# Patient Record
Sex: FEMALE | Race: OTHER | HISPANIC OR LATINO
[De-identification: names, ages, dates, MRNs, and addresses within clinical notes are randomized per-mention and may not be internally consistent; named-entity substitution may affect disease eponyms.]

---

## 2017-01-26 ENCOUNTER — APPOINTMENT (OUTPATIENT)
Dept: HEART AND VASCULAR | Facility: CLINIC | Age: 72
End: 2017-01-26

## 2017-01-26 VITALS — DIASTOLIC BLOOD PRESSURE: 68 MMHG | SYSTOLIC BLOOD PRESSURE: 124 MMHG | OXYGEN SATURATION: 97 % | HEART RATE: 63 BPM

## 2017-01-26 VITALS — BODY MASS INDEX: 33.55 KG/M2 | HEIGHT: 61.5 IN | WEIGHT: 180 LBS

## 2017-01-26 VITALS — TEMPERATURE: 98 F

## 2017-01-26 DIAGNOSIS — F17.200 NICOTINE DEPENDENCE, UNSPECIFIED, UNCOMPLICATED: ICD-10-CM

## 2017-01-26 RX ORDER — ASPIRIN 325 MG
TABLET ORAL
Refills: 0 | Status: ACTIVE | COMMUNITY

## 2017-01-27 LAB
ALBUMIN SERPL ELPH-MCNC: 4.3 G/DL
ALP BLD-CCNC: 116 U/L
ALT SERPL-CCNC: 9 U/L
ANION GAP SERPL CALC-SCNC: 16 MMOL/L
APPEARANCE: CLEAR
AST SERPL-CCNC: 17 U/L
BACTERIA: NEGATIVE
BASOPHILS # BLD AUTO: 0.07 K/UL
BASOPHILS NFR BLD AUTO: 1.3 %
BILIRUB SERPL-MCNC: 0.3 MG/DL
BILIRUBIN URINE: NEGATIVE
BLOOD URINE: NEGATIVE
BUN SERPL-MCNC: 15 MG/DL
CALCIUM SERPL-MCNC: 9.4 MG/DL
CHLORIDE SERPL-SCNC: 104 MMOL/L
CHOLEST SERPL-MCNC: 198 MG/DL
CHOLEST/HDLC SERPL: 4.3 RATIO
CO2 SERPL-SCNC: 24 MMOL/L
COLOR: YELLOW
CREAT SERPL-MCNC: 0.76 MG/DL
EOSINOPHIL # BLD AUTO: 0.12 K/UL
EOSINOPHIL NFR BLD AUTO: 2.3 %
GLUCOSE QUALITATIVE U: NORMAL MG/DL
GLUCOSE SERPL-MCNC: 103 MG/DL
HBA1C MFR BLD HPLC: 6.1 %
HCT VFR BLD CALC: 41.1 %
HDLC SERPL-MCNC: 46 MG/DL
HGB BLD-MCNC: 12.4 G/DL
HYALINE CASTS: 1 /LPF
IMM GRANULOCYTES NFR BLD AUTO: 0.2 %
KETONES URINE: NEGATIVE
LDLC SERPL CALC-MCNC: 119 MG/DL
LEUKOCYTE ESTERASE URINE: NEGATIVE
LYMPHOCYTES # BLD AUTO: 1.89 K/UL
LYMPHOCYTES NFR BLD AUTO: 35.7 %
MAN DIFF?: NORMAL
MCHC RBC-ENTMCNC: 29.2 PG
MCHC RBC-ENTMCNC: 30.2 GM/DL
MCV RBC AUTO: 96.7 FL
MICROSCOPIC-UA: NORMAL
MONOCYTES # BLD AUTO: 0.37 K/UL
MONOCYTES NFR BLD AUTO: 7 %
NEUTROPHILS # BLD AUTO: 2.83 K/UL
NEUTROPHILS NFR BLD AUTO: 53.5 %
NITRITE URINE: NEGATIVE
PH URINE: 6.5
PLATELET # BLD AUTO: 321 K/UL
POTASSIUM SERPL-SCNC: 4.3 MMOL/L
PROT SERPL-MCNC: 6.8 G/DL
PROTEIN URINE: NEGATIVE MG/DL
RBC # BLD: 4.25 M/UL
RBC # FLD: 14.9 %
RED BLOOD CELLS URINE: 1 /HPF
SODIUM SERPL-SCNC: 144 MMOL/L
SPECIFIC GRAVITY URINE: 1.01
SQUAMOUS EPITHELIAL CELLS: 1 /HPF
TRIGL SERPL-MCNC: 164 MG/DL
TSH SERPL-ACNC: 1.25 UIU/ML
UROBILINOGEN URINE: NORMAL MG/DL
WBC # FLD AUTO: 5.29 K/UL
WHITE BLOOD CELLS URINE: 0 /HPF

## 2017-02-28 ENCOUNTER — FORM ENCOUNTER (OUTPATIENT)
Age: 72
End: 2017-02-28

## 2017-03-01 ENCOUNTER — APPOINTMENT (OUTPATIENT)
Dept: ORTHOPEDIC SURGERY | Facility: CLINIC | Age: 72
End: 2017-03-01

## 2017-03-01 ENCOUNTER — OUTPATIENT (OUTPATIENT)
Dept: OUTPATIENT SERVICES | Facility: HOSPITAL | Age: 72
LOS: 1 days | End: 2017-03-01
Payer: MEDICARE

## 2017-03-01 DIAGNOSIS — Z86.79 PERSONAL HISTORY OF OTHER DISEASES OF THE CIRCULATORY SYSTEM: ICD-10-CM

## 2017-03-01 DIAGNOSIS — M25.512 PAIN IN LEFT SHOULDER: ICD-10-CM

## 2017-03-01 PROCEDURE — 73030 X-RAY EXAM OF SHOULDER: CPT | Mod: 26,LT

## 2017-03-01 PROCEDURE — 73564 X-RAY EXAM KNEE 4 OR MORE: CPT

## 2017-03-01 PROCEDURE — 73030 X-RAY EXAM OF SHOULDER: CPT

## 2017-03-01 PROCEDURE — 73564 X-RAY EXAM KNEE 4 OR MORE: CPT | Mod: 26,LT

## 2017-03-06 ENCOUNTER — APPOINTMENT (OUTPATIENT)
Dept: ORTHOPEDIC SURGERY | Facility: CLINIC | Age: 72
End: 2017-03-06

## 2017-03-06 ENCOUNTER — FORM ENCOUNTER (OUTPATIENT)
Age: 72
End: 2017-03-06

## 2017-03-07 ENCOUNTER — OUTPATIENT (OUTPATIENT)
Dept: OUTPATIENT SERVICES | Facility: HOSPITAL | Age: 72
LOS: 1 days | End: 2017-03-07
Payer: MEDICARE

## 2017-03-07 PROCEDURE — 20611 DRAIN/INJ JOINT/BURSA W/US: CPT | Mod: LT

## 2017-03-07 PROCEDURE — 20611 DRAIN/INJ JOINT/BURSA W/US: CPT

## 2017-03-07 PROCEDURE — 81003 URINALYSIS AUTO W/O SCOPE: CPT

## 2017-03-13 ENCOUNTER — MEDICATION RENEWAL (OUTPATIENT)
Age: 72
End: 2017-03-13

## 2017-03-16 ENCOUNTER — APPOINTMENT (OUTPATIENT)
Dept: ORTHOPEDIC SURGERY | Facility: CLINIC | Age: 72
End: 2017-03-16

## 2017-03-22 ENCOUNTER — APPOINTMENT (OUTPATIENT)
Dept: ORTHOPEDIC SURGERY | Facility: CLINIC | Age: 72
End: 2017-03-22

## 2017-04-26 ENCOUNTER — APPOINTMENT (OUTPATIENT)
Dept: ORTHOPEDIC SURGERY | Facility: CLINIC | Age: 72
End: 2017-04-26

## 2017-05-17 ENCOUNTER — APPOINTMENT (OUTPATIENT)
Dept: ORTHOPEDIC SURGERY | Facility: CLINIC | Age: 72
End: 2017-05-17

## 2017-05-29 ENCOUNTER — TRANSCRIPTION ENCOUNTER (OUTPATIENT)
Age: 72
End: 2017-05-29

## 2017-06-14 ENCOUNTER — TRANSCRIPTION ENCOUNTER (OUTPATIENT)
Age: 72
End: 2017-06-14

## 2017-08-28 ENCOUNTER — APPOINTMENT (OUTPATIENT)
Dept: HEART AND VASCULAR | Facility: CLINIC | Age: 72
End: 2017-08-28
Payer: MEDICARE

## 2017-08-28 VITALS
SYSTOLIC BLOOD PRESSURE: 126 MMHG | DIASTOLIC BLOOD PRESSURE: 78 MMHG | BODY MASS INDEX: 33.92 KG/M2 | HEIGHT: 61.5 IN | OXYGEN SATURATION: 94 % | TEMPERATURE: 98.1 F | HEART RATE: 55 BPM | WEIGHT: 182 LBS

## 2017-08-28 PROCEDURE — 99214 OFFICE O/P EST MOD 30 MIN: CPT | Mod: 25

## 2017-08-28 PROCEDURE — 36415 COLL VENOUS BLD VENIPUNCTURE: CPT

## 2017-08-29 LAB
ALBUMIN SERPL ELPH-MCNC: 4.3 G/DL
ALP BLD-CCNC: 106 U/L
ALT SERPL-CCNC: 10 U/L
ANION GAP SERPL CALC-SCNC: 15 MMOL/L
AST SERPL-CCNC: 21 U/L
BASOPHILS # BLD AUTO: 0.06 K/UL
BASOPHILS NFR BLD AUTO: 1.3 %
BILIRUB SERPL-MCNC: 0.4 MG/DL
BUN SERPL-MCNC: 11 MG/DL
CALCIUM SERPL-MCNC: 9.7 MG/DL
CHLORIDE SERPL-SCNC: 106 MMOL/L
CHOLEST SERPL-MCNC: 181 MG/DL
CHOLEST/HDLC SERPL: 3.5 RATIO
CO2 SERPL-SCNC: 25 MMOL/L
CREAT SERPL-MCNC: 0.82 MG/DL
EOSINOPHIL # BLD AUTO: 0.11 K/UL
EOSINOPHIL NFR BLD AUTO: 2.4 %
GLUCOSE SERPL-MCNC: 87 MG/DL
HBA1C MFR BLD HPLC: 6 %
HCT VFR BLD CALC: 40.3 %
HDLC SERPL-MCNC: 51 MG/DL
HGB BLD-MCNC: 12.3 G/DL
IMM GRANULOCYTES NFR BLD AUTO: 0 %
LDLC SERPL CALC-MCNC: 99 MG/DL
LYMPHOCYTES # BLD AUTO: 1.59 K/UL
LYMPHOCYTES NFR BLD AUTO: 34.7 %
MAN DIFF?: NORMAL
MCHC RBC-ENTMCNC: 28.5 PG
MCHC RBC-ENTMCNC: 30.5 GM/DL
MCV RBC AUTO: 93.5 FL
MONOCYTES # BLD AUTO: 0.34 K/UL
MONOCYTES NFR BLD AUTO: 7.4 %
NEUTROPHILS # BLD AUTO: 2.48 K/UL
NEUTROPHILS NFR BLD AUTO: 54.2 %
PLATELET # BLD AUTO: 305 K/UL
POTASSIUM SERPL-SCNC: 4.7 MMOL/L
PROT SERPL-MCNC: 7.4 G/DL
RBC # BLD: 4.31 M/UL
RBC # FLD: 14.2 %
SODIUM SERPL-SCNC: 146 MMOL/L
TRIGL SERPL-MCNC: 155 MG/DL
TSH SERPL-ACNC: 1.09 UIU/ML
WBC # FLD AUTO: 4.58 K/UL

## 2017-09-05 ENCOUNTER — TRANSCRIPTION ENCOUNTER (OUTPATIENT)
Age: 72
End: 2017-09-05

## 2017-09-28 ENCOUNTER — APPOINTMENT (OUTPATIENT)
Dept: HEART AND VASCULAR | Facility: CLINIC | Age: 72
End: 2017-09-28
Payer: MEDICARE

## 2017-09-28 PROCEDURE — 78452 HT MUSCLE IMAGE SPECT MULT: CPT

## 2017-09-28 PROCEDURE — 93015 CV STRESS TEST SUPVJ I&R: CPT

## 2017-09-28 PROCEDURE — A9500: CPT

## 2017-09-28 PROCEDURE — 93306 TTE W/DOPPLER COMPLETE: CPT | Mod: XE

## 2017-09-28 PROCEDURE — 99214 OFFICE O/P EST MOD 30 MIN: CPT | Mod: 25

## 2017-10-11 ENCOUNTER — APPOINTMENT (OUTPATIENT)
Dept: ORTHOPEDIC SURGERY | Facility: CLINIC | Age: 72
End: 2017-10-11

## 2017-10-11 ENCOUNTER — APPOINTMENT (OUTPATIENT)
Dept: ORTHOPEDIC SURGERY | Facility: CLINIC | Age: 72
End: 2017-10-11
Payer: MEDICARE

## 2017-10-11 PROCEDURE — 99214 OFFICE O/P EST MOD 30 MIN: CPT

## 2017-10-18 ENCOUNTER — FORM ENCOUNTER (OUTPATIENT)
Age: 72
End: 2017-10-18

## 2017-10-19 ENCOUNTER — APPOINTMENT (OUTPATIENT)
Dept: ULTRASOUND IMAGING | Facility: CLINIC | Age: 72
End: 2017-10-19
Payer: MEDICARE

## 2017-10-19 ENCOUNTER — OUTPATIENT (OUTPATIENT)
Dept: OUTPATIENT SERVICES | Facility: HOSPITAL | Age: 72
LOS: 1 days | End: 2017-10-19

## 2017-10-19 PROCEDURE — 20611 DRAIN/INJ JOINT/BURSA W/US: CPT | Mod: LT

## 2017-10-23 ENCOUNTER — FORM ENCOUNTER (OUTPATIENT)
Age: 72
End: 2017-10-23

## 2017-10-24 ENCOUNTER — OUTPATIENT (OUTPATIENT)
Dept: OUTPATIENT SERVICES | Facility: HOSPITAL | Age: 72
LOS: 1 days | End: 2017-10-24
Payer: MEDICARE

## 2017-10-24 ENCOUNTER — APPOINTMENT (OUTPATIENT)
Dept: ORTHOPEDIC SURGERY | Facility: CLINIC | Age: 72
End: 2017-10-24
Payer: MEDICARE

## 2017-10-24 VITALS
BODY MASS INDEX: 33.92 KG/M2 | DIASTOLIC BLOOD PRESSURE: 80 MMHG | WEIGHT: 182 LBS | HEIGHT: 61.5 IN | SYSTOLIC BLOOD PRESSURE: 120 MMHG

## 2017-10-24 PROCEDURE — 72170 X-RAY EXAM OF PELVIS: CPT | Mod: 26

## 2017-10-24 PROCEDURE — 72170 X-RAY EXAM OF PELVIS: CPT

## 2017-10-24 PROCEDURE — 99215 OFFICE O/P EST HI 40 MIN: CPT

## 2017-11-01 ENCOUNTER — OTHER (OUTPATIENT)
Age: 72
End: 2017-11-01

## 2017-11-05 ENCOUNTER — FORM ENCOUNTER (OUTPATIENT)
Age: 72
End: 2017-11-05

## 2017-11-06 ENCOUNTER — OUTPATIENT (OUTPATIENT)
Dept: OUTPATIENT SERVICES | Facility: HOSPITAL | Age: 72
LOS: 1 days | End: 2017-11-06
Payer: MEDICARE

## 2017-11-06 DIAGNOSIS — Z22.321 CARRIER OR SUSPECTED CARRIER OF METHICILLIN SUSCEPTIBLE STAPHYLOCOCCUS AUREUS: ICD-10-CM

## 2017-11-06 LAB
ANION GAP SERPL CALC-SCNC: 12 MMOL/L — SIGNIFICANT CHANGE UP (ref 5–17)
APPEARANCE UR: CLEAR — SIGNIFICANT CHANGE UP
APTT BLD: 29.2 SEC — SIGNIFICANT CHANGE UP (ref 27.5–37.4)
BACTERIA # UR AUTO: PRESENT /HPF
BILIRUB UR-MCNC: NEGATIVE — SIGNIFICANT CHANGE UP
BUN SERPL-MCNC: 15 MG/DL — SIGNIFICANT CHANGE UP (ref 7–23)
CALCIUM SERPL-MCNC: 10.2 MG/DL — SIGNIFICANT CHANGE UP (ref 8.4–10.5)
CHLORIDE SERPL-SCNC: 102 MMOL/L — SIGNIFICANT CHANGE UP (ref 96–108)
CO2 SERPL-SCNC: 28 MMOL/L — SIGNIFICANT CHANGE UP (ref 22–31)
COLOR SPEC: YELLOW — SIGNIFICANT CHANGE UP
CREAT SERPL-MCNC: 0.86 MG/DL — SIGNIFICANT CHANGE UP (ref 0.5–1.3)
DIFF PNL FLD: NEGATIVE — SIGNIFICANT CHANGE UP
EPI CELLS # UR: SIGNIFICANT CHANGE UP /HPF (ref 0–5)
GLUCOSE SERPL-MCNC: 88 MG/DL — SIGNIFICANT CHANGE UP (ref 70–99)
GLUCOSE UR QL: NEGATIVE — SIGNIFICANT CHANGE UP
HBA1C BLD-MCNC: 5.8 % — HIGH (ref 4–5.6)
HCT VFR BLD CALC: 41.5 % — SIGNIFICANT CHANGE UP (ref 34.5–45)
HGB BLD-MCNC: 12.9 G/DL — SIGNIFICANT CHANGE UP (ref 11.5–15.5)
INR BLD: 0.96 — SIGNIFICANT CHANGE UP (ref 0.88–1.16)
KETONES UR-MCNC: NEGATIVE — SIGNIFICANT CHANGE UP
LEUKOCYTE ESTERASE UR-ACNC: (no result)
MCHC RBC-ENTMCNC: 28.9 PG — SIGNIFICANT CHANGE UP (ref 27–34)
MCHC RBC-ENTMCNC: 31.1 G/DL — LOW (ref 32–36)
MCV RBC AUTO: 92.8 FL — SIGNIFICANT CHANGE UP (ref 80–100)
MRSA PCR RESULT.: NEGATIVE — SIGNIFICANT CHANGE UP
NITRITE UR-MCNC: NEGATIVE — SIGNIFICANT CHANGE UP
PH UR: 5.5 — SIGNIFICANT CHANGE UP (ref 5–8)
PLATELET # BLD AUTO: 297 K/UL — SIGNIFICANT CHANGE UP (ref 150–400)
POTASSIUM SERPL-MCNC: 4.8 MMOL/L — SIGNIFICANT CHANGE UP (ref 3.5–5.3)
POTASSIUM SERPL-SCNC: 4.8 MMOL/L — SIGNIFICANT CHANGE UP (ref 3.5–5.3)
PROT UR-MCNC: NEGATIVE MG/DL — SIGNIFICANT CHANGE UP
PROTHROM AB SERPL-ACNC: 10.6 SEC — SIGNIFICANT CHANGE UP (ref 9.8–12.7)
RBC # BLD: 4.47 M/UL — SIGNIFICANT CHANGE UP (ref 3.8–5.2)
RBC # FLD: 13.5 % — SIGNIFICANT CHANGE UP (ref 10.3–16.9)
RBC CASTS # UR COMP ASSIST: < 5 /HPF — SIGNIFICANT CHANGE UP
S AUREUS DNA NOSE QL NAA+PROBE: NEGATIVE — SIGNIFICANT CHANGE UP
SODIUM SERPL-SCNC: 142 MMOL/L — SIGNIFICANT CHANGE UP (ref 135–145)
SP GR SPEC: <=1.005 — SIGNIFICANT CHANGE UP (ref 1–1.03)
UROBILINOGEN FLD QL: 0.2 E.U./DL — SIGNIFICANT CHANGE UP
WBC # BLD: 4.9 K/UL — SIGNIFICANT CHANGE UP (ref 3.8–10.5)
WBC # FLD AUTO: 4.9 K/UL — SIGNIFICANT CHANGE UP (ref 3.8–10.5)
WBC UR QL: < 5 /HPF — SIGNIFICANT CHANGE UP

## 2017-11-06 PROCEDURE — 81001 URINALYSIS AUTO W/SCOPE: CPT

## 2017-11-06 PROCEDURE — 36415 COLL VENOUS BLD VENIPUNCTURE: CPT

## 2017-11-06 PROCEDURE — 71020: CPT | Mod: 26

## 2017-11-06 PROCEDURE — 85610 PROTHROMBIN TIME: CPT

## 2017-11-06 PROCEDURE — 83036 HEMOGLOBIN GLYCOSYLATED A1C: CPT

## 2017-11-06 PROCEDURE — 85730 THROMBOPLASTIN TIME PARTIAL: CPT

## 2017-11-06 PROCEDURE — 87641 MR-STAPH DNA AMP PROBE: CPT

## 2017-11-06 PROCEDURE — 80048 BASIC METABOLIC PNL TOTAL CA: CPT

## 2017-11-06 PROCEDURE — 85027 COMPLETE CBC AUTOMATED: CPT

## 2017-11-06 PROCEDURE — 71046 X-RAY EXAM CHEST 2 VIEWS: CPT

## 2017-11-06 PROCEDURE — 87086 URINE CULTURE/COLONY COUNT: CPT

## 2017-11-07 LAB
CULTURE RESULTS: NO GROWTH — SIGNIFICANT CHANGE UP
SPECIMEN SOURCE: SIGNIFICANT CHANGE UP

## 2017-11-13 ENCOUNTER — APPOINTMENT (OUTPATIENT)
Dept: HEART AND VASCULAR | Facility: CLINIC | Age: 72
End: 2017-11-13
Payer: MEDICARE

## 2017-11-13 ENCOUNTER — APPOINTMENT (OUTPATIENT)
Dept: ORTHOPEDIC SURGERY | Facility: CLINIC | Age: 72
End: 2017-11-13
Payer: MEDICARE

## 2017-11-13 VITALS
BODY MASS INDEX: 34.29 KG/M2 | SYSTOLIC BLOOD PRESSURE: 130 MMHG | OXYGEN SATURATION: 96 % | DIASTOLIC BLOOD PRESSURE: 80 MMHG | HEIGHT: 61.5 IN | HEART RATE: 52 BPM | WEIGHT: 184 LBS | TEMPERATURE: 98.1 F

## 2017-11-13 PROCEDURE — 99213 OFFICE O/P EST LOW 20 MIN: CPT

## 2017-11-13 PROCEDURE — 99214 OFFICE O/P EST MOD 30 MIN: CPT | Mod: 25

## 2017-11-13 PROCEDURE — 93000 ELECTROCARDIOGRAM COMPLETE: CPT

## 2017-11-13 RX ORDER — LOSARTAN POTASSIUM 100 MG/1
TABLET, FILM COATED ORAL
Refills: 0 | Status: DISCONTINUED | COMMUNITY
End: 2017-11-13

## 2017-11-13 RX ORDER — FUROSEMIDE 20 MG/1
20 TABLET ORAL
Refills: 0 | Status: DISCONTINUED | COMMUNITY
End: 2017-11-13

## 2017-11-19 ENCOUNTER — FORM ENCOUNTER (OUTPATIENT)
Age: 72
End: 2017-11-19

## 2017-11-20 ENCOUNTER — APPOINTMENT (OUTPATIENT)
Dept: ORTHOPEDIC SURGERY | Facility: HOSPITAL | Age: 72
End: 2017-11-20

## 2017-11-20 ENCOUNTER — RESULT REVIEW (OUTPATIENT)
Age: 72
End: 2017-11-20

## 2017-11-20 ENCOUNTER — TRANSCRIPTION ENCOUNTER (OUTPATIENT)
Age: 72
End: 2017-11-20

## 2017-11-20 ENCOUNTER — INPATIENT (INPATIENT)
Facility: HOSPITAL | Age: 72
LOS: 3 days | Discharge: HOME HEALTH | DRG: 470 | End: 2017-11-24
Attending: ORTHOPAEDIC SURGERY | Admitting: ORTHOPAEDIC SURGERY
Payer: MEDICARE

## 2017-11-20 VITALS
HEIGHT: 62 IN | SYSTOLIC BLOOD PRESSURE: 160 MMHG | WEIGHT: 182.98 LBS | OXYGEN SATURATION: 96 % | TEMPERATURE: 98 F | RESPIRATION RATE: 20 BRPM | HEART RATE: 102 BPM | DIASTOLIC BLOOD PRESSURE: 80 MMHG

## 2017-11-20 DIAGNOSIS — Z96.641 PRESENCE OF RIGHT ARTIFICIAL HIP JOINT: Chronic | ICD-10-CM

## 2017-11-20 DIAGNOSIS — M17.12 UNILATERAL PRIMARY OSTEOARTHRITIS, LEFT KNEE: ICD-10-CM

## 2017-11-20 DIAGNOSIS — Z96.651 PRESENCE OF RIGHT ARTIFICIAL KNEE JOINT: Chronic | ICD-10-CM

## 2017-11-20 PROCEDURE — 73560 X-RAY EXAM OF KNEE 1 OR 2: CPT | Mod: 26,LT

## 2017-11-20 PROCEDURE — 27447 TOTAL KNEE ARTHROPLASTY: CPT | Mod: LT

## 2017-11-20 RX ORDER — ACETAMINOPHEN 500 MG
650 TABLET ORAL EVERY 6 HOURS
Qty: 0 | Refills: 0 | Status: DISCONTINUED | OUTPATIENT
Start: 2017-11-20 | End: 2017-11-24

## 2017-11-20 RX ORDER — POLYETHYLENE GLYCOL 3350 17 G/17G
17 POWDER, FOR SOLUTION ORAL DAILY
Qty: 0 | Refills: 0 | Status: DISCONTINUED | OUTPATIENT
Start: 2017-11-20 | End: 2017-11-24

## 2017-11-20 RX ORDER — OXYCODONE HYDROCHLORIDE 5 MG/1
20 TABLET ORAL ONCE
Qty: 0 | Refills: 0 | Status: DISCONTINUED | OUTPATIENT
Start: 2017-11-20 | End: 2017-11-20

## 2017-11-20 RX ORDER — BUPIVACAINE 13.3 MG/ML
20 INJECTION, SUSPENSION, LIPOSOMAL INFILTRATION ONCE
Qty: 0 | Refills: 0 | Status: DISCONTINUED | OUTPATIENT
Start: 2017-11-20 | End: 2017-11-24

## 2017-11-20 RX ORDER — OXYCODONE HYDROCHLORIDE 5 MG/1
10 TABLET ORAL EVERY 4 HOURS
Qty: 0 | Refills: 0 | Status: DISCONTINUED | OUTPATIENT
Start: 2017-11-20 | End: 2017-11-24

## 2017-11-20 RX ORDER — MORPHINE SULFATE 50 MG/1
4 CAPSULE, EXTENDED RELEASE ORAL ONCE
Qty: 0 | Refills: 0 | Status: DISCONTINUED | OUTPATIENT
Start: 2017-11-20 | End: 2017-11-24

## 2017-11-20 RX ORDER — SERTRALINE 25 MG/1
50 TABLET, FILM COATED ORAL DAILY
Qty: 0 | Refills: 0 | Status: DISCONTINUED | OUTPATIENT
Start: 2017-11-20 | End: 2017-11-24

## 2017-11-20 RX ORDER — MAGNESIUM HYDROXIDE 400 MG/1
30 TABLET, CHEWABLE ORAL DAILY
Qty: 0 | Refills: 0 | Status: DISCONTINUED | OUTPATIENT
Start: 2017-11-20 | End: 2017-11-24

## 2017-11-20 RX ORDER — DOCUSATE SODIUM 100 MG
100 CAPSULE ORAL THREE TIMES A DAY
Qty: 0 | Refills: 0 | Status: DISCONTINUED | OUTPATIENT
Start: 2017-11-20 | End: 2017-11-24

## 2017-11-20 RX ORDER — INFLUENZA VIRUS VACCINE 15; 15; 15; 15 UG/.5ML; UG/.5ML; UG/.5ML; UG/.5ML
0.5 SUSPENSION INTRAMUSCULAR ONCE
Qty: 0 | Refills: 0 | Status: COMPLETED | OUTPATIENT
Start: 2017-11-20 | End: 2017-11-20

## 2017-11-20 RX ORDER — SODIUM CHLORIDE 9 MG/ML
1000 INJECTION, SOLUTION INTRAVENOUS
Qty: 0 | Refills: 0 | Status: DISCONTINUED | OUTPATIENT
Start: 2017-11-20 | End: 2017-11-24

## 2017-11-20 RX ORDER — ATENOLOL 25 MG/1
25 TABLET ORAL AT BEDTIME
Qty: 0 | Refills: 0 | Status: DISCONTINUED | OUTPATIENT
Start: 2017-11-20 | End: 2017-11-24

## 2017-11-20 RX ORDER — CELECOXIB 200 MG/1
400 CAPSULE ORAL ONCE
Qty: 0 | Refills: 0 | Status: COMPLETED | OUTPATIENT
Start: 2017-11-20 | End: 2017-11-20

## 2017-11-20 RX ORDER — ASPIRIN/CALCIUM CARB/MAGNESIUM 324 MG
325 TABLET ORAL
Qty: 0 | Refills: 0 | Status: DISCONTINUED | OUTPATIENT
Start: 2017-11-20 | End: 2017-11-24

## 2017-11-20 RX ORDER — OXYCODONE HYDROCHLORIDE 5 MG/1
5 TABLET ORAL EVERY 4 HOURS
Qty: 0 | Refills: 0 | Status: DISCONTINUED | OUTPATIENT
Start: 2017-11-20 | End: 2017-11-24

## 2017-11-20 RX ORDER — GABAPENTIN 400 MG/1
300 CAPSULE ORAL THREE TIMES A DAY
Qty: 0 | Refills: 0 | Status: DISCONTINUED | OUTPATIENT
Start: 2017-11-20 | End: 2017-11-24

## 2017-11-20 RX ORDER — HYDRALAZINE HCL 50 MG
10 TABLET ORAL ONCE
Qty: 0 | Refills: 0 | Status: COMPLETED | OUTPATIENT
Start: 2017-11-20 | End: 2017-11-20

## 2017-11-20 RX ORDER — PANTOPRAZOLE SODIUM 20 MG/1
40 TABLET, DELAYED RELEASE ORAL DAILY
Qty: 0 | Refills: 0 | Status: DISCONTINUED | OUTPATIENT
Start: 2017-11-20 | End: 2017-11-24

## 2017-11-20 RX ORDER — SIMVASTATIN 20 MG/1
10 TABLET, FILM COATED ORAL AT BEDTIME
Qty: 0 | Refills: 0 | Status: DISCONTINUED | OUTPATIENT
Start: 2017-11-20 | End: 2017-11-24

## 2017-11-20 RX ORDER — LOSARTAN POTASSIUM 100 MG/1
50 TABLET, FILM COATED ORAL DAILY
Qty: 0 | Refills: 0 | Status: DISCONTINUED | OUTPATIENT
Start: 2017-11-20 | End: 2017-11-24

## 2017-11-20 RX ORDER — FUROSEMIDE 40 MG
20 TABLET ORAL DAILY
Qty: 0 | Refills: 0 | Status: DISCONTINUED | OUTPATIENT
Start: 2017-11-20 | End: 2017-11-24

## 2017-11-20 RX ORDER — SENNA PLUS 8.6 MG/1
2 TABLET ORAL AT BEDTIME
Qty: 0 | Refills: 0 | Status: DISCONTINUED | OUTPATIENT
Start: 2017-11-20 | End: 2017-11-24

## 2017-11-20 RX ORDER — HYDRALAZINE HCL 50 MG
10 TABLET ORAL ONCE
Qty: 0 | Refills: 0 | Status: DISCONTINUED | OUTPATIENT
Start: 2017-11-20 | End: 2017-11-20

## 2017-11-20 RX ORDER — ONDANSETRON 8 MG/1
4 TABLET, FILM COATED ORAL EVERY 6 HOURS
Qty: 0 | Refills: 0 | Status: DISCONTINUED | OUTPATIENT
Start: 2017-11-20 | End: 2017-11-24

## 2017-11-20 RX ADMIN — OXYCODONE HYDROCHLORIDE 20 MILLIGRAM(S): 5 TABLET ORAL at 14:11

## 2017-11-20 RX ADMIN — Medication 325 MILLIGRAM(S): at 21:32

## 2017-11-20 RX ADMIN — ATENOLOL 25 MILLIGRAM(S): 25 TABLET ORAL at 22:03

## 2017-11-20 RX ADMIN — Medication 100 MILLIGRAM(S): at 21:32

## 2017-11-20 RX ADMIN — Medication 10 MILLIGRAM(S): at 19:50

## 2017-11-20 RX ADMIN — GABAPENTIN 300 MILLIGRAM(S): 400 CAPSULE ORAL at 21:32

## 2017-11-20 RX ADMIN — SIMVASTATIN 10 MILLIGRAM(S): 20 TABLET, FILM COATED ORAL at 21:32

## 2017-11-20 RX ADMIN — SODIUM CHLORIDE 120 MILLILITER(S): 9 INJECTION, SOLUTION INTRAVENOUS at 20:30

## 2017-11-20 RX ADMIN — CELECOXIB 400 MILLIGRAM(S): 200 CAPSULE ORAL at 14:11

## 2017-11-20 NOTE — H&P ADULT - NSHPLABSRESULTS_GEN_ALL_CORE
Preop CBC, BMP, PT/PTT/INR, UA - WNL per medical clearance   Preop CXR - WNL per medical clearance   ECHO 9/28 WNL - with EKG printed and reviewed by Dr. Seymour; EF 55%  Nasal swab negative

## 2017-11-20 NOTE — DISCHARGE NOTE ADULT - HOSPITAL COURSE
Admit   Pt to OR for Left Total Knee Arthroplasty, Robotic Assisted on 11/20/17  Perioperative antibiotics administered/  DVT ppx while inpatient  Physical Therapy  Pain management  Pt stable and cleared by Dr. Granado for discharge on __/__/__ Admit   Pt to OR for Left Total Knee Arthroplasty, Robotic Assisted on 11/20/17  Perioperative antibiotics administered/  DVT ppx while inpatient  Physical Therapy  Pain management  Pt stable and cleared by Dr. Granado for discharge

## 2017-11-20 NOTE — H&P ADULT - PROBLEM SELECTOR PLAN 1
Admit to Orthopaedic Service.  Presents today for elective left TKR  Pt medically stable and cleared for procedure today by Dr. Seymour

## 2017-11-20 NOTE — DISCHARGE NOTE ADULT - CARE PLAN
Goal:	To improve functional status and mobility  Instructions for follow-up, activity and diet:	No strenuous activity, heavy lifting, driving or returning to work until cleared by MD.  You may shower - dressing is water-resistant, no soaking in bathtubs.  Remove dressing after post op day 5-7, then leave incision open to air. Keep incision clean and dry. You may shower once dressings are removed unless there is any drainage. If there is ongoing drainage 5-7 days after surgery at dressing removal, cover the wound with a clean bandage and call Dr. Granado.  Try to have regular bowel movements, take stool softener or laxative if necessary.  May take Pepcid or Zantac for upset stomach.  May take Aleve or Naproxen instead of Meloxicam.  Swelling may travel all the way down leg to foot, this is normal and will subside in a few weeks.  Call to schedule an appt with Dr. Granado for follow up, if you have staples or non-dissolvable sutures they will be removed in office.  Contact your doctor if you experience: fever greater than 101F, chills, chest pain, difficulty breathing, redness or excessive drainage around the incision, other concerns.    ***Take all medications as prescribed and directed by Dr. Granado after discharge.*** Principal Discharge DX:	Primary osteoarthritis of left knee  Goal:	To improve functional status and mobility  Instructions for follow-up, activity and diet:	You are weight bearing as tolerated on your left leg while ambulating.  No strenuous activity, heavy lifting, driving or returning to work until cleared by MD.  You may shower - dressing is water-resistant, no soaking in bathtubs.  Remove dressing after post op day 5-7, then leave incision open to air. Keep incision clean and dry. You may shower once dressings are removed unless there is any drainage. If there is ongoing drainage 5-7 days after surgery at dressing removal, cover the wound with a clean bandage and call Dr. Granado.  Try to have regular bowel movements, take stool softener or laxative if necessary.  May take Pepcid or Zantac for upset stomach.  May take Aleve or Naproxen instead of Meloxicam.  Swelling may travel all the way down leg to foot, this is normal and will subside in a few weeks.  Call to schedule an appt with Dr. Granado for follow up, if you have staples or non-dissolvable sutures they will be removed in office.  Contact your doctor if you experience: fever greater than 101F, chills, chest pain, difficulty breathing, redness or excessive drainage around the incision, other concerns.    ***Take all medications as prescribed and directed by Dr. Granado after discharge.***

## 2017-11-20 NOTE — DISCHARGE NOTE ADULT - CARE PROVIDER_API CALL
Brian Granado), Orthopaedic Surgery  [address]  [address] [place] [address]  Phone: [phone]  Fax: [phone]

## 2017-11-20 NOTE — BRIEF OPERATIVE NOTE - PROCEDURE
<<-----Click on this checkbox to enter Procedure Total knee arthroplasty  11/20/2017    Active  EMILY

## 2017-11-20 NOTE — H&P ADULT - NSHPPHYSICALEXAM_GEN_ALL_CORE
MSK: + Decreased ROM secondary to pain, left knee  Skin warm and well perfused, no visible wounds/erythema/ecchymsoes  EHL/FHL/TA/GS 5/5 motor strength bilateral lower extremities  SLT in tact and equal to distal bilateral lower extremities  DP/PT pulses 2+ bilateral lower extremities    Remainder of exam per medical clearance note

## 2017-11-20 NOTE — DISCHARGE NOTE ADULT - PATIENT PORTAL LINK FT
“You can access the Follow[employer] Patient Portal, offered by [facility], by registering with the following website: [email or web address]”

## 2017-11-20 NOTE — DISCHARGE NOTE ADULT - MEDICATION SUMMARY - MEDICATIONS TO STOP TAKING
I will STOP taking the medications listed below when I get home from the hospital:    meloxicam 15 mg oral tablet  -- 1 tab(s) by mouth once a day I will STOP taking the medications listed below when I get home from the hospital:    meloxicam 15 mg oral tablet  -- 1 tab(s) by mouth once a day    aspirin 81 mg oral tablet  -- 1 tab(s) by mouth once a day (at bedtime)

## 2017-11-20 NOTE — DISCHARGE NOTE ADULT - PLAN OF CARE
To improve functional status and mobility No strenuous activity, heavy lifting, driving or returning to work until cleared by MD.  You may shower - dressing is water-resistant, no soaking in bathtubs.  Remove dressing after post op day 5-7, then leave incision open to air. Keep incision clean and dry. You may shower once dressings are removed unless there is any drainage. If there is ongoing drainage 5-7 days after surgery at dressing removal, cover the wound with a clean bandage and call Dr. Granado.  Try to have regular bowel movements, take stool softener or laxative if necessary.  May take Pepcid or Zantac for upset stomach.  May take Aleve or Naproxen instead of Meloxicam.  Swelling may travel all the way down leg to foot, this is normal and will subside in a few weeks.  Call to schedule an appt with Dr. Granado for follow up, if you have staples or non-dissolvable sutures they will be removed in office.  Contact your doctor if you experience: fever greater than 101F, chills, chest pain, difficulty breathing, redness or excessive drainage around the incision, other concerns.    ***Take all medications as prescribed and directed by Dr. Granado after discharge.*** You are weight bearing as tolerated on your left leg while ambulating.  No strenuous activity, heavy lifting, driving or returning to work until cleared by MD.  You may shower - dressing is water-resistant, no soaking in bathtubs.  Remove dressing after post op day 5-7, then leave incision open to air. Keep incision clean and dry. You may shower once dressings are removed unless there is any drainage. If there is ongoing drainage 5-7 days after surgery at dressing removal, cover the wound with a clean bandage and call Dr. Granado.  Try to have regular bowel movements, take stool softener or laxative if necessary.  May take Pepcid or Zantac for upset stomach.  May take Aleve or Naproxen instead of Meloxicam.  Swelling may travel all the way down leg to foot, this is normal and will subside in a few weeks.  Call to schedule an appt with Dr. Granado for follow up, if you have staples or non-dissolvable sutures they will be removed in office.  Contact your doctor if you experience: fever greater than 101F, chills, chest pain, difficulty breathing, redness or excessive drainage around the incision, other concerns.    ***Take all medications as prescribed and directed by Dr. Granado after discharge.***

## 2017-11-20 NOTE — DISCHARGE NOTE ADULT - MEDICATION SUMMARY - MEDICATIONS TO TAKE
I will START or STAY ON the medications listed below when I get home from the hospital:    aspirin 325 mg oral delayed release tablet  -- 1 tab(s) by mouth 2 times a day  -- Indication: For Clot prevention    losartan 50 mg oral tablet  -- 1 tab(s) by mouth once a day  Losartan potassium  -- Indication: For Hypertension    gabapentin 300 mg oral capsule  -- 1 cap(s) by mouth 3 times a day  -- Indication: For Neuropathy    sertraline 50 mg oral tablet  -- 1 tab(s) by mouth once a day  -- Indication: For Depression    simvastatin 10 mg oral tablet  -- 1 tab(s) by mouth once a day (at bedtime)  -- Indication: For Cholesterol    atenolol 25 mg oral tablet  -- 1 tab(s) by mouth once a day (at bedtime)  -- Indication: For Hypertension    furosemide 20 mg oral tablet  -- 1 tab(s) by mouth once a day  -- Indication: For Hypertension    docusate sodium 100 mg oral capsule  -- 1 cap(s) by mouth 3 times a day  -- Indication: For Constipation (OTC)    senna oral tablet  -- 2 tab(s) by mouth once a day (at bedtime), As needed, Constipation  -- Indication: For Constipation (OTC)    pantoprazole 40 mg oral delayed release tablet  -- 1 tab(s) by mouth once a day  -- Indication: For GERD    Vitamin D2  -- 10,000  -- Indication: For Supplement I will START or STAY ON the medications listed below when I get home from the hospital:    aspirin 325 mg oral delayed release tablet  -- 1 tab(s) by mouth 2 times a day  -- Indication: For Clot prevention    oxyCODONE 5 mg oral tablet  -- 1 tab(s) by mouth every 4 hours, As needed, Mild Pain  -- Indication: For Pain med    oxyCODONE 10 mg oral tablet  -- 1 tab(s) by mouth every 4 hours, As needed, Moderate Pain  -- Indication: For Pain med    losartan 50 mg oral tablet  -- 1 tab(s) by mouth once a day  Losartan potassium  -- Indication: For Hypertension    gabapentin 300 mg oral capsule  -- 1 cap(s) by mouth 3 times a day  -- Indication: For Neuropathy    sertraline 50 mg oral tablet  -- 1 tab(s) by mouth once a day  -- Indication: For Depression    simvastatin 10 mg oral tablet  -- 1 tab(s) by mouth once a day (at bedtime)  -- Indication: For Cholesterol    atenolol 25 mg oral tablet  -- 1 tab(s) by mouth once a day (at bedtime)  -- Indication: For Hypertension    furosemide 20 mg oral tablet  -- 1 tab(s) by mouth once a day  -- Indication: For Hypertension    docusate sodium 100 mg oral capsule  -- 1 cap(s) by mouth 3 times a day  -- Indication: For Constipation (OTC)    senna oral tablet  -- 2 tab(s) by mouth once a day (at bedtime), As needed, Constipation  -- Indication: For Constipation (OTC)    pantoprazole 40 mg oral delayed release tablet  -- 1 tab(s) by mouth once a day  -- Indication: For GERD    Vitamin D2  -- 10,000  -- Indication: For Supplement

## 2017-11-20 NOTE — H&P ADULT - HISTORY OF PRESENT ILLNESS
72F c/o left knee pain x chronic. Pt denies preceding trauma/injury. Pt states her knee pain is lcoalized and exacerbated with walking. Pt denies numbness/tingling of bilateral lower extremities. Pt reports left lower extremity weakness; pt ambulates with a cane inside the house. Denies DVT hx; denies Cp, SOB, N/V, tactile fevers today.    Present for left total knee placement today with or without robotic assistance

## 2017-11-21 LAB
ANION GAP SERPL CALC-SCNC: 13 MMOL/L — SIGNIFICANT CHANGE UP (ref 5–17)
BUN SERPL-MCNC: 13 MG/DL — SIGNIFICANT CHANGE UP (ref 7–23)
CALCIUM SERPL-MCNC: 9.4 MG/DL — SIGNIFICANT CHANGE UP (ref 8.4–10.5)
CHLORIDE SERPL-SCNC: 104 MMOL/L — SIGNIFICANT CHANGE UP (ref 96–108)
CO2 SERPL-SCNC: 25 MMOL/L — SIGNIFICANT CHANGE UP (ref 22–31)
CREAT SERPL-MCNC: 0.72 MG/DL — SIGNIFICANT CHANGE UP (ref 0.5–1.3)
GLUCOSE SERPL-MCNC: 129 MG/DL — HIGH (ref 70–99)
HCT VFR BLD CALC: 35.8 % — SIGNIFICANT CHANGE UP (ref 34.5–45)
HGB BLD-MCNC: 11.6 G/DL — SIGNIFICANT CHANGE UP (ref 11.5–15.5)
MCHC RBC-ENTMCNC: 29.3 PG — SIGNIFICANT CHANGE UP (ref 27–34)
MCHC RBC-ENTMCNC: 32.4 G/DL — SIGNIFICANT CHANGE UP (ref 32–36)
MCV RBC AUTO: 90.4 FL — SIGNIFICANT CHANGE UP (ref 80–100)
PLATELET # BLD AUTO: 263 K/UL — SIGNIFICANT CHANGE UP (ref 150–400)
POTASSIUM SERPL-MCNC: 4.3 MMOL/L — SIGNIFICANT CHANGE UP (ref 3.5–5.3)
POTASSIUM SERPL-SCNC: 4.3 MMOL/L — SIGNIFICANT CHANGE UP (ref 3.5–5.3)
RBC # BLD: 3.96 M/UL — SIGNIFICANT CHANGE UP (ref 3.8–5.2)
RBC # FLD: 13.3 % — SIGNIFICANT CHANGE UP (ref 10.3–16.9)
SODIUM SERPL-SCNC: 142 MMOL/L — SIGNIFICANT CHANGE UP (ref 135–145)
WBC # BLD: 7.7 K/UL — SIGNIFICANT CHANGE UP (ref 3.8–10.5)
WBC # FLD AUTO: 7.7 K/UL — SIGNIFICANT CHANGE UP (ref 3.8–10.5)

## 2017-11-21 RX ORDER — CEFAZOLIN SODIUM 1 G
2000 VIAL (EA) INJECTION EVERY 8 HOURS
Qty: 0 | Refills: 0 | Status: COMPLETED | OUTPATIENT
Start: 2017-11-21 | End: 2017-11-21

## 2017-11-21 RX ADMIN — Medication 325 MILLIGRAM(S): at 13:25

## 2017-11-21 RX ADMIN — Medication 20 MILLIGRAM(S): at 05:14

## 2017-11-21 RX ADMIN — LOSARTAN POTASSIUM 50 MILLIGRAM(S): 100 TABLET, FILM COATED ORAL at 05:14

## 2017-11-21 RX ADMIN — PANTOPRAZOLE SODIUM 40 MILLIGRAM(S): 20 TABLET, DELAYED RELEASE ORAL at 13:25

## 2017-11-21 RX ADMIN — Medication 100 MILLIGRAM(S): at 05:14

## 2017-11-21 RX ADMIN — ATENOLOL 25 MILLIGRAM(S): 25 TABLET ORAL at 21:06

## 2017-11-21 RX ADMIN — GABAPENTIN 300 MILLIGRAM(S): 400 CAPSULE ORAL at 05:14

## 2017-11-21 RX ADMIN — Medication 100 MILLIGRAM(S): at 13:25

## 2017-11-21 RX ADMIN — SERTRALINE 50 MILLIGRAM(S): 25 TABLET, FILM COATED ORAL at 13:25

## 2017-11-21 RX ADMIN — Medication 100 MILLIGRAM(S): at 21:06

## 2017-11-21 RX ADMIN — Medication 100 MILLIGRAM(S): at 13:26

## 2017-11-21 RX ADMIN — SIMVASTATIN 10 MILLIGRAM(S): 20 TABLET, FILM COATED ORAL at 21:06

## 2017-11-21 RX ADMIN — GABAPENTIN 300 MILLIGRAM(S): 400 CAPSULE ORAL at 13:25

## 2017-11-21 RX ADMIN — OXYCODONE HYDROCHLORIDE 5 MILLIGRAM(S): 5 TABLET ORAL at 20:15

## 2017-11-21 RX ADMIN — OXYCODONE HYDROCHLORIDE 5 MILLIGRAM(S): 5 TABLET ORAL at 21:15

## 2017-11-21 RX ADMIN — POLYETHYLENE GLYCOL 3350 17 GRAM(S): 17 POWDER, FOR SOLUTION ORAL at 13:25

## 2017-11-21 RX ADMIN — GABAPENTIN 300 MILLIGRAM(S): 400 CAPSULE ORAL at 21:06

## 2017-11-21 RX ADMIN — Medication 325 MILLIGRAM(S): at 20:15

## 2017-11-21 RX ADMIN — OXYCODONE HYDROCHLORIDE 5 MILLIGRAM(S): 5 TABLET ORAL at 13:42

## 2017-11-21 RX ADMIN — Medication 100 MILLIGRAM(S): at 01:23

## 2017-11-21 NOTE — PHYSICAL THERAPY INITIAL EVALUATION ADULT - CRITERIA FOR SKILLED THERAPEUTIC INTERVENTIONS
risk reduction/prevention/rehab potential/anticipated equipment needs at discharge/predicted duration of therapy intervention/impairments found/functional limitations in following categories/anticipated discharge recommendation/therapy frequency

## 2017-11-21 NOTE — PHYSICAL THERAPY INITIAL EVALUATION ADULT - PERTINENT HX OF CURRENT PROBLEM, REHAB EVAL
Patient is a 70 y/o F with chief c/o chronic left knee pain progressively worsening to impact functional mobility presenting for elective TKR.

## 2017-11-21 NOTE — PHYSICAL THERAPY INITIAL EVALUATION ADULT - GENERAL OBSERVATIONS, REHAB EVAL
Patient encountered supine in bed, NAD, +CB, +heplock, surgical site C/D/I, +SCD's b/l, RN Bailey aware.

## 2017-11-21 NOTE — PROGRESS NOTE ADULT - SUBJECTIVE AND OBJECTIVE BOX
ORTHO NOTE    [x ] Pt seen/examined.  [ ] Pt without any complaints/in NAD.    [x ] Pt complains of: Pt c/o back pain from being in bed all night, no knee pain.      ROS: [ ] Fever  [ ] Chills  [ ] CP [ ] SOB [ ] Dysnea  [ ] Palpitations [ ] Cough [ ] N/V/C/D [ ] Paresthia [ ] Other     [ ] ROS  otherwise negative    .    PHYSICAL EXAM:    Vital Signs Last 24 Hrs  T(C): 35.9 (21 Nov 2017 08:46), Max: 36.3 (20 Nov 2017 21:15)  T(F): 96.7 (21 Nov 2017 08:46), Max: 97.4 (20 Nov 2017 21:15)  HR: 67 (21 Nov 2017 08:46) (40 - 78)  BP: 125/51 (21 Nov 2017 08:46) (124/58 - 202/79)  BP(mean): 125 (20 Nov 2017 19:42) (100 - 125)  RR: 16 (21 Nov 2017 08:46) (0 - 21)  SpO2: 94% (21 Nov 2017 08:46) (94% - 100%)    I&O's Detail    20 Nov 2017 07:01  -  21 Nov 2017 07:00  --------------------------------------------------------  IN:    lactated ringers.: 1080 mL  Total IN: 1080 mL    OUT:    Voided: 750 mL  Total OUT: 750 mL    Total NET: 330 mL           CAPILLARY BLOOD GLUCOSE                      Neuro: NAD AO x3    Lungs:    CV:    ABD:     Ext: Aquacel CDI  5/5 FHL EHL GS TA  SILT B/L    LABS   21 Nov 2017 07:41    142    |  104    |  13     ----------------------------<  129    4.3     |  25     |  0.72     Ca    9.4        21 Nov 2017 07:41                                   11.6   7.7   )-----------( 263      ( 21 Nov 2017 07:41 )             35.8                 [ ] Other Labs  [ ] None ordered            Please check or [place] when present:  •  Heart Failure:    [ ] Acute        [ ]  Acute on Chronic        [ ] Chronic         [ ] Diastolic     [ ]  Combined    •  SELENE:     [ ] ATN        [ ]  Renal medullary necrosis       [ ]  Renal cortical necrosis                  [ ] Other pathological Lesion:  •  CKD:  [ ] Stage I   [ ] Stage II  [ ] Stage III    [ ]Stage IV   [ ]  CKD V   [ ]  Other/Unspecified:    •  Abdominal Nutritional Status:   [ ] Malnutrition-See Nutrition note    [ ] Cachexia   [ ]  Other        [ ] Supplement ordered:            [ ] Morbid Obesity: BMI >=40         ASSESSMENT/PLAN:      STATUS POST: L TKA POD1  HTN - stable overnight and this morning. Continue to monitor.  CONTINUE:          [ ] PT    [ ] DVT PPX-    [ ] Pain Mgt    [ ] Dispo plan-Home PT

## 2017-11-21 NOTE — PHYSICAL THERAPY INITIAL EVALUATION ADULT - ADDITIONAL COMMENTS
Patient reports living in first floor apt building with no steps to enter. Patient use a quad cane at baseline for assistance with ambulation.

## 2017-11-21 NOTE — PROGRESS NOTE ADULT - ASSESSMENT
A/P:  72y Female s/p L TKA on 11/20/17  - WBAT  - DVT ppx  - PT  - Analgesia  - Dispo  - Antibiotics: perioperative x24h

## 2017-11-21 NOTE — PROGRESS NOTE ADULT - SUBJECTIVE AND OBJECTIVE BOX
Orthopaedic Surgery Progress Note    S: Pain well-controlled. Denies CP/SOB/N/V    O:  Vital Signs Last 24 Hrs  T(C): 35.9 (21 Nov 2017 08:46), Max: 36.3 (20 Nov 2017 21:15)  T(F): 96.7 (21 Nov 2017 08:46), Max: 97.4 (20 Nov 2017 21:15)  HR: 67 (21 Nov 2017 08:46) (40 - 78)  BP: 125/51 (21 Nov 2017 08:46) (124/58 - 202/79)  BP(mean): 125 (20 Nov 2017 19:42) (100 - 125)  RR: 16 (21 Nov 2017 08:46) (0 - 21)  SpO2: 94% (21 Nov 2017 08:46) (94% - 100%)    I&O's Summary    20 Nov 2017 07:01  -  21 Nov 2017 07:00  --------------------------------------------------------  IN: 1080 mL / OUT: 750 mL / NET: 330 mL        MEDICATIONS  (STANDING):  aspirin enteric coated 325 milliGRAM(s) Oral two times a day  ATENolol  Tablet 25 milliGRAM(s) Oral at bedtime  BUpivacaine liposome 1.3% Injectable (no eMAR) 20 milliLiter(s) Local Injection once  docusate sodium 100 milliGRAM(s) Oral three times a day  furosemide    Tablet 20 milliGRAM(s) Oral daily  gabapentin 300 milliGRAM(s) Oral three times a day  lactated ringers. 1000 milliLiter(s) (120 mL/Hr) IV Continuous <Continuous>  losartan 50 milliGRAM(s) Oral daily  morphine  - Injectable 4 milliGRAM(s) IV Push once  pantoprazole    Tablet 40 milliGRAM(s) Oral daily  polyethylene glycol 3350 17 Gram(s) Oral daily  sertraline 50 milliGRAM(s) Oral daily  simvastatin 10 milliGRAM(s) Oral at bedtime    MEDICATIONS  (PRN):  acetaminophen   Tablet 650 milliGRAM(s) Oral every 6 hours PRN For Temp over 38.3 C (100.94 F)  aluminum hydroxide/magnesium hydroxide/simethicone Suspension 30 milliLiter(s) Oral four times a day PRN Indigestion  magnesium hydroxide Suspension 30 milliLiter(s) Oral daily PRN Constipation  ondansetron Injectable 4 milliGRAM(s) IV Push every 6 hours PRN Nausea and/or Vomiting  oxyCODONE    IR 5 milliGRAM(s) Oral every 4 hours PRN Mild Pain  oxyCODONE    IR 10 milliGRAM(s) Oral every 4 hours PRN Moderate Pain  senna 2 Tablet(s) Oral at bedtime PRN Constipation                            11.6   7.7   )-----------( 263      ( 21 Nov 2017 07:41 )             35.8       11-21    142  |  104  |  13  ----------------------------<  129<H>  4.3   |  25  |  0.72    Ca    9.4      21 Nov 2017 07:41      EXAM:  Gen: NAD, Alert  LLE: Dressing C/D/I, Fires TA/EHL/GS, SILT s/sp/dp/tib, toes/foot wwp

## 2017-11-21 NOTE — PHYSICAL THERAPY INITIAL EVALUATION ADULT - IMPAIRMENTS FOUND, PT EVAL
ergonomics and body mechanics/muscle strength/aerobic capacity/endurance/gait, locomotion, and balance/posture/ROM

## 2017-11-21 NOTE — PROGRESS NOTE ADULT - SUBJECTIVE AND OBJECTIVE BOX
Orthopaedics Post Op Check    Procedure: L TKA  Surgeon: Loy    Pt comfortable, without complaints  Denies CP, SOB, N/V, numbness/tingling     Vital Signs Last 24 Hrs  T(C): 36.3 (20 Nov 2017 21:15), Max: 36.6 (20 Nov 2017 11:31)  T(F): 97.4 (20 Nov 2017 21:15), Max: 97.8 (20 Nov 2017 11:31)  HR: 78 (20 Nov 2017 21:15) (40 - 102)  BP: 144/82 (20 Nov 2017 21:15) (124/58 - 202/79)  BP(mean): 125 (20 Nov 2017 19:42) (100 - 125)  RR: 18 (20 Nov 2017 21:15) (0 - 21)  SpO2: 99% (20 Nov 2017 21:15) (96% - 100%)  AVSS, NAD    Dressing C/D/I  General: Pt Alert and oriented     Pulses: WWP, DP/PT 2+  Sensation: SILT  Motor: 5/5 EHL/FHL/TA/GS          Post op XR: no fracture or foreign body    A/P: 72yFemale POD#0 s/p above procedure  - Stable  - Pain Control  - DVT ppx: ASA  - Post op abx: Ancef  - PT, WBS: WBAT  - F/U AM Labs    Gregorio Prasad MD, PGY-2  [phone]

## 2017-11-21 NOTE — PHYSICAL THERAPY INITIAL EVALUATION ADULT - RANGE OF MOTION EXAMINATION, REHAB EVAL
bilateral upper extremity ROM was WNL (within normal limits)/Right LE ROM was WNL (within normal limits)

## 2017-11-22 RX ORDER — SENNA PLUS 8.6 MG/1
2 TABLET ORAL
Qty: 0 | Refills: 0 | DISCHARGE
Start: 2017-11-22

## 2017-11-22 RX ORDER — HYDRALAZINE HCL 50 MG
50 TABLET ORAL ONCE
Qty: 0 | Refills: 0 | Status: COMPLETED | OUTPATIENT
Start: 2017-11-22 | End: 2017-11-22

## 2017-11-22 RX ORDER — ASPIRIN/CALCIUM CARB/MAGNESIUM 324 MG
1 TABLET ORAL
Qty: 0 | Refills: 0 | COMMUNITY

## 2017-11-22 RX ORDER — ASPIRIN/CALCIUM CARB/MAGNESIUM 324 MG
1 TABLET ORAL
Qty: 0 | Refills: 0 | DISCHARGE
Start: 2017-11-22

## 2017-11-22 RX ORDER — MELOXICAM 15 MG/1
1 TABLET ORAL
Qty: 0 | Refills: 0 | COMMUNITY

## 2017-11-22 RX ORDER — DOCUSATE SODIUM 100 MG
1 CAPSULE ORAL
Qty: 0 | Refills: 0 | DISCHARGE
Start: 2017-11-22

## 2017-11-22 RX ADMIN — ONDANSETRON 4 MILLIGRAM(S): 8 TABLET, FILM COATED ORAL at 16:41

## 2017-11-22 RX ADMIN — Medication 50 MILLIGRAM(S): at 19:44

## 2017-11-22 RX ADMIN — LOSARTAN POTASSIUM 50 MILLIGRAM(S): 100 TABLET, FILM COATED ORAL at 05:17

## 2017-11-22 RX ADMIN — GABAPENTIN 300 MILLIGRAM(S): 400 CAPSULE ORAL at 21:07

## 2017-11-22 RX ADMIN — OXYCODONE HYDROCHLORIDE 5 MILLIGRAM(S): 5 TABLET ORAL at 20:20

## 2017-11-22 RX ADMIN — ATENOLOL 25 MILLIGRAM(S): 25 TABLET ORAL at 21:08

## 2017-11-22 RX ADMIN — SERTRALINE 50 MILLIGRAM(S): 25 TABLET, FILM COATED ORAL at 12:00

## 2017-11-22 RX ADMIN — OXYCODONE HYDROCHLORIDE 5 MILLIGRAM(S): 5 TABLET ORAL at 13:40

## 2017-11-22 RX ADMIN — Medication 100 MILLIGRAM(S): at 14:41

## 2017-11-22 RX ADMIN — OXYCODONE HYDROCHLORIDE 5 MILLIGRAM(S): 5 TABLET ORAL at 05:17

## 2017-11-22 RX ADMIN — OXYCODONE HYDROCHLORIDE 5 MILLIGRAM(S): 5 TABLET ORAL at 21:10

## 2017-11-22 RX ADMIN — PANTOPRAZOLE SODIUM 40 MILLIGRAM(S): 20 TABLET, DELAYED RELEASE ORAL at 12:00

## 2017-11-22 RX ADMIN — OXYCODONE HYDROCHLORIDE 5 MILLIGRAM(S): 5 TABLET ORAL at 06:17

## 2017-11-22 RX ADMIN — Medication 325 MILLIGRAM(S): at 18:10

## 2017-11-22 RX ADMIN — GABAPENTIN 300 MILLIGRAM(S): 400 CAPSULE ORAL at 05:17

## 2017-11-22 RX ADMIN — POLYETHYLENE GLYCOL 3350 17 GRAM(S): 17 POWDER, FOR SOLUTION ORAL at 12:00

## 2017-11-22 RX ADMIN — Medication 100 MILLIGRAM(S): at 21:08

## 2017-11-22 RX ADMIN — Medication 100 MILLIGRAM(S): at 05:17

## 2017-11-22 RX ADMIN — Medication 325 MILLIGRAM(S): at 05:17

## 2017-11-22 RX ADMIN — GABAPENTIN 300 MILLIGRAM(S): 400 CAPSULE ORAL at 14:41

## 2017-11-22 RX ADMIN — Medication 50 MILLIGRAM(S): at 16:53

## 2017-11-22 RX ADMIN — Medication 20 MILLIGRAM(S): at 05:17

## 2017-11-22 RX ADMIN — OXYCODONE HYDROCHLORIDE 5 MILLIGRAM(S): 5 TABLET ORAL at 13:08

## 2017-11-22 RX ADMIN — Medication 650 MILLIGRAM(S): at 21:11

## 2017-11-22 RX ADMIN — SIMVASTATIN 10 MILLIGRAM(S): 20 TABLET, FILM COATED ORAL at 21:08

## 2017-11-22 NOTE — DIETITIAN INITIAL EVALUATION ADULT. - OTHER INFO
Pt is a 71y/o woman s/p L TKA on 11/20. Currently denies N/V/D/C, states pain is 4/10. Skin intact w/ ASW. NKFA. No difficulties chewing/swallowing. No therapeutic diet followed PTA. RD visualized >75% breakfast completion.

## 2017-11-22 NOTE — PROGRESS NOTE ADULT - SUBJECTIVE AND OBJECTIVE BOX
Orthopaedic Surgery Progress Note    S: Pain well-controlled. Denies CP/SOB/N/V    O:  Vital Signs Last 24 Hrs  T(C): 36.4 (11-22-17 @ 05:14), Max: 36.9 (11-21-17 @ 20:59)  T(F): 97.5 (11-22-17 @ 05:14), Max: 98.5 (11-21-17 @ 20:59)  HR: 65 (11-22-17 @ 05:14) (64 - 70)  BP: 165/79 (11-22-17 @ 05:14) (125/51 - 165/79)  BP(mean): --  RR: 16 (11-22-17 @ 05:14) (16 - 16)  SpO2: 95% (11-22-17 @ 05:14) (94% - 95%)    MEDICATIONS  (STANDING):  aspirin enteric coated 325 milliGRAM(s) Oral two times a day  ATENolol  Tablet 25 milliGRAM(s) Oral at bedtime  BUpivacaine liposome 1.3% Injectable (no eMAR) 20 milliLiter(s) Local Injection once  docusate sodium 100 milliGRAM(s) Oral three times a day  furosemide    Tablet 20 milliGRAM(s) Oral daily  gabapentin 300 milliGRAM(s) Oral three times a day  lactated ringers. 1000 milliLiter(s) (120 mL/Hr) IV Continuous <Continuous>  losartan 50 milliGRAM(s) Oral daily  morphine  - Injectable 4 milliGRAM(s) IV Push once  pantoprazole    Tablet 40 milliGRAM(s) Oral daily  polyethylene glycol 3350 17 Gram(s) Oral daily  sertraline 50 milliGRAM(s) Oral daily  simvastatin 10 milliGRAM(s) Oral at bedtime    MEDICATIONS  (PRN):  acetaminophen   Tablet 650 milliGRAM(s) Oral every 6 hours PRN For Temp over 38.3 C (100.94 F)  aluminum hydroxide/magnesium hydroxide/simethicone Suspension 30 milliLiter(s) Oral four times a day PRN Indigestion  bisacodyl Suppository 10 milliGRAM(s) Rectal daily PRN If no bowel movement by postoperative day #2  magnesium hydroxide Suspension 30 milliLiter(s) Oral daily PRN Constipation  ondansetron Injectable 4 milliGRAM(s) IV Push every 6 hours PRN Nausea and/or Vomiting  oxyCODONE    IR 5 milliGRAM(s) Oral every 4 hours PRN Mild Pain  oxyCODONE    IR 10 milliGRAM(s) Oral every 4 hours PRN Moderate Pain  senna 2 Tablet(s) Oral at bedtime PRN Constipation      I&O's Summary      11-21    142  |  104  |  13  ----------------------------<  129<H>  4.3   |  25  |  0.72    Ca    9.4      21 Nov 2017 07:41                              11.6   7.7   )-----------( 263      ( 21 Nov 2017 07:41 )             35.8     EXAM:  Gen: NAD, Alert  LLE: Dressing C/D/I, Fires TA/EHL/GS, SILT s/sp/dp/tib, toes/foot wwp

## 2017-11-22 NOTE — DIETITIAN INITIAL EVALUATION ADULT. - ENERGY NEEDS
ht: 62" Wt: 83kg, %IBW: 166%, BMI: 33.5.   IBW used for calculations as pt >120% of IBW   Needs estimated for post-op healing.

## 2017-11-22 NOTE — PROGRESS NOTE ADULT - SUBJECTIVE AND OBJECTIVE BOX
ORTHO NOTE    [x ] Pt seen/examined.  [x ] Pt without any complaints/in NAD.    [ ] Pt complains of:      ROS: [ ] Fever  [ ] Chills  [ ] CP [ ] SOB [ ] Dysnea  [ ] Palpitations [ ] Cough [ ] N/V/C/D [ ] Paresthia [ ] Other     [ ] ROS  otherwise negative    .    PHYSICAL EXAM:    Vital Signs Last 24 Hrs  T(C): 36.8 (22 Nov 2017 10:05), Max: 36.9 (21 Nov 2017 20:59)  T(F): 98.2 (22 Nov 2017 10:05), Max: 98.5 (21 Nov 2017 20:59)  HR: 59 (22 Nov 2017 10:05) (59 - 70)  BP: 123/76 (22 Nov 2017 10:05) (123/76 - 165/79)  BP(mean): --  RR: 15 (22 Nov 2017 10:05) (15 - 16)  SpO2: 95% (22 Nov 2017 10:05) (95% - 95%)    I&O's Detail       CAPILLARY BLOOD GLUCOSE                      Neuro:    Lungs:    CV:    ABD:     Ext: Aquacel CDI  5/5 FHL EHL GS TA  SILT B/L	    LABS   21 Nov 2017 07:41    142    |  104    |  13     ----------------------------<  129    4.3     |  25     |  0.72     Ca    9.4        21 Nov 2017 07:41                                   11.6   7.7   )-----------( 263      ( 21 Nov 2017 07:41 )             35.8                 [ ] Other Labs  [ ] None ordered            Please check or [place] when present:  •  Heart Failure:    [ ] Acute        [ ]  Acute on Chronic        [ ] Chronic         [ ] Diastolic     [ ]  Combined    •  SELENE:     [ ] ATN        [ ]  Renal medullary necrosis       [ ]  Renal cortical necrosis                  [ ] Other pathological Lesion:  •  CKD:  [ ] Stage I   [ ] Stage II  [ ] Stage III    [ ]Stage IV   [ ]  CKD V   [ ]  Other/Unspecified:    •  Abdominal Nutritional Status:   [ ] Malnutrition-See Nutrition note    [ ] Cachexia   [ ]  Other        [ ] Supplement ordered:            [ ] Morbid Obesity: BMI >=40         ASSESSMENT/PLAN:      STATUS POST: L TKA POD2    CONTINUE:          [ ] PT    [ ] DVT PPX-ASA    [ ] Pain Mgt    [ ] Dispo plan-HPT

## 2017-11-23 RX ADMIN — OXYCODONE HYDROCHLORIDE 5 MILLIGRAM(S): 5 TABLET ORAL at 14:35

## 2017-11-23 RX ADMIN — OXYCODONE HYDROCHLORIDE 5 MILLIGRAM(S): 5 TABLET ORAL at 05:18

## 2017-11-23 RX ADMIN — ATENOLOL 25 MILLIGRAM(S): 25 TABLET ORAL at 21:45

## 2017-11-23 RX ADMIN — Medication 325 MILLIGRAM(S): at 19:19

## 2017-11-23 RX ADMIN — LOSARTAN POTASSIUM 50 MILLIGRAM(S): 100 TABLET, FILM COATED ORAL at 05:18

## 2017-11-23 RX ADMIN — OXYCODONE HYDROCHLORIDE 5 MILLIGRAM(S): 5 TABLET ORAL at 20:10

## 2017-11-23 RX ADMIN — OXYCODONE HYDROCHLORIDE 5 MILLIGRAM(S): 5 TABLET ORAL at 00:00

## 2017-11-23 RX ADMIN — Medication 325 MILLIGRAM(S): at 05:18

## 2017-11-23 RX ADMIN — OXYCODONE HYDROCHLORIDE 5 MILLIGRAM(S): 5 TABLET ORAL at 15:00

## 2017-11-23 RX ADMIN — OXYCODONE HYDROCHLORIDE 5 MILLIGRAM(S): 5 TABLET ORAL at 09:18

## 2017-11-23 RX ADMIN — Medication 100 MILLIGRAM(S): at 21:46

## 2017-11-23 RX ADMIN — PANTOPRAZOLE SODIUM 40 MILLIGRAM(S): 20 TABLET, DELAYED RELEASE ORAL at 12:17

## 2017-11-23 RX ADMIN — Medication 100 MILLIGRAM(S): at 05:18

## 2017-11-23 RX ADMIN — GABAPENTIN 300 MILLIGRAM(S): 400 CAPSULE ORAL at 21:45

## 2017-11-23 RX ADMIN — OXYCODONE HYDROCHLORIDE 5 MILLIGRAM(S): 5 TABLET ORAL at 09:40

## 2017-11-23 RX ADMIN — Medication 100 MILLIGRAM(S): at 19:19

## 2017-11-23 RX ADMIN — GABAPENTIN 300 MILLIGRAM(S): 400 CAPSULE ORAL at 12:17

## 2017-11-23 RX ADMIN — OXYCODONE HYDROCHLORIDE 5 MILLIGRAM(S): 5 TABLET ORAL at 19:19

## 2017-11-23 RX ADMIN — SIMVASTATIN 10 MILLIGRAM(S): 20 TABLET, FILM COATED ORAL at 21:45

## 2017-11-23 RX ADMIN — SERTRALINE 50 MILLIGRAM(S): 25 TABLET, FILM COATED ORAL at 12:17

## 2017-11-23 RX ADMIN — GABAPENTIN 300 MILLIGRAM(S): 400 CAPSULE ORAL at 05:18

## 2017-11-23 NOTE — PROGRESS NOTE ADULT - SUBJECTIVE AND OBJECTIVE BOX
Orthopaedic Surgery Progress Note    S: Pain well-controlled. Denies CP/SOB/N/V    O:  Vital Signs Last 24 Hrs  T(C): 36.4 (23 Nov 2017 05:12), Max: 37.1 (22 Nov 2017 18:15)  T(F): 97.5 (23 Nov 2017 05:12), Max: 98.7 (22 Nov 2017 18:15)  HR: 63 (23 Nov 2017 05:12) (59 - 73)  BP: 125/82 (23 Nov 2017 05:12) (123/76 - 202/90)  BP(mean): --  RR: 18 (23 Nov 2017 05:12) (15 - 18)  SpO2: 95% (23 Nov 2017 05:12) (95% - 100%)                          11.6   7.7   )-----------( 263      ( 21 Nov 2017 07:41 )             35.8       EXAM:  Gen: NAD, Alert  LLE: Dressing C/D/I, Fires TA/EHL/GS, SILT s/sp/dp/tib, toes/foot wwp    A/P:  72y Female s/p L TKA on 11/20/17  - WBAT  - DVT ppx  - PT  - Analgesia  - Dispo: Home

## 2017-11-24 VITALS
DIASTOLIC BLOOD PRESSURE: 78 MMHG | TEMPERATURE: 98 F | HEART RATE: 83 BPM | OXYGEN SATURATION: 94 % | RESPIRATION RATE: 16 BRPM | SYSTOLIC BLOOD PRESSURE: 146 MMHG

## 2017-11-24 LAB — SURGICAL PATHOLOGY STUDY: SIGNIFICANT CHANGE UP

## 2017-11-24 PROCEDURE — C1776: CPT

## 2017-11-24 PROCEDURE — 85027 COMPLETE CBC AUTOMATED: CPT

## 2017-11-24 PROCEDURE — 36415 COLL VENOUS BLD VENIPUNCTURE: CPT

## 2017-11-24 PROCEDURE — 88300 SURGICAL PATH GROSS: CPT

## 2017-11-24 PROCEDURE — 97116 GAIT TRAINING THERAPY: CPT

## 2017-11-24 PROCEDURE — C1713: CPT

## 2017-11-24 PROCEDURE — 97161 PT EVAL LOW COMPLEX 20 MIN: CPT

## 2017-11-24 PROCEDURE — 80048 BASIC METABOLIC PNL TOTAL CA: CPT

## 2017-11-24 PROCEDURE — 73560 X-RAY EXAM OF KNEE 1 OR 2: CPT

## 2017-11-24 RX ORDER — OXYCODONE HYDROCHLORIDE 5 MG/1
1 TABLET ORAL
Qty: 0 | Refills: 0 | DISCHARGE
Start: 2017-11-24

## 2017-11-24 RX ADMIN — Medication 325 MILLIGRAM(S): at 16:57

## 2017-11-24 RX ADMIN — SERTRALINE 50 MILLIGRAM(S): 25 TABLET, FILM COATED ORAL at 11:47

## 2017-11-24 RX ADMIN — POLYETHYLENE GLYCOL 3350 17 GRAM(S): 17 POWDER, FOR SOLUTION ORAL at 11:48

## 2017-11-24 RX ADMIN — MAGNESIUM HYDROXIDE 30 MILLILITER(S): 400 TABLET, CHEWABLE ORAL at 09:40

## 2017-11-24 RX ADMIN — OXYCODONE HYDROCHLORIDE 10 MILLIGRAM(S): 5 TABLET ORAL at 01:02

## 2017-11-24 RX ADMIN — OXYCODONE HYDROCHLORIDE 10 MILLIGRAM(S): 5 TABLET ORAL at 05:03

## 2017-11-24 RX ADMIN — OXYCODONE HYDROCHLORIDE 10 MILLIGRAM(S): 5 TABLET ORAL at 10:00

## 2017-11-24 RX ADMIN — Medication 325 MILLIGRAM(S): at 06:07

## 2017-11-24 RX ADMIN — LOSARTAN POTASSIUM 50 MILLIGRAM(S): 100 TABLET, FILM COATED ORAL at 06:08

## 2017-11-24 RX ADMIN — OXYCODONE HYDROCHLORIDE 10 MILLIGRAM(S): 5 TABLET ORAL at 09:34

## 2017-11-24 RX ADMIN — OXYCODONE HYDROCHLORIDE 10 MILLIGRAM(S): 5 TABLET ORAL at 05:45

## 2017-11-24 RX ADMIN — OXYCODONE HYDROCHLORIDE 10 MILLIGRAM(S): 5 TABLET ORAL at 01:50

## 2017-11-24 RX ADMIN — GABAPENTIN 300 MILLIGRAM(S): 400 CAPSULE ORAL at 06:08

## 2017-11-24 RX ADMIN — Medication 100 MILLIGRAM(S): at 06:07

## 2017-11-24 RX ADMIN — PANTOPRAZOLE SODIUM 40 MILLIGRAM(S): 20 TABLET, DELAYED RELEASE ORAL at 11:48

## 2017-11-24 RX ADMIN — OXYCODONE HYDROCHLORIDE 10 MILLIGRAM(S): 5 TABLET ORAL at 16:56

## 2017-11-24 RX ADMIN — Medication 20 MILLIGRAM(S): at 06:08

## 2017-11-24 NOTE — PROGRESS NOTE ADULT - SUBJECTIVE AND OBJECTIVE BOX
POST OPERATIVE DAY #:   STATUS POST: Left   TKA                      SUBJECTIVE: Patient seen and examined at bedside. Pt. laying comfortably in bed. Denies any sob/chestpain/n/v/, numbness/tingling in b/l les.     Pain:  well controlled      OBJECTIVE:     Vital Signs Last 24 Hrs  T(C): 31.7 (24 Nov 2017 14:08), Max: 36.7 (24 Nov 2017 05:45)  T(F): 89 (24 Nov 2017 14:08), Max: 98.1 (24 Nov 2017 05:45)  HR: 89 (24 Nov 2017 14:08) (60 - 89)  BP: 121/56 (24 Nov 2017 14:08) (121/56 - 170/81)  BP(mean): --  RR: 16 (24 Nov 2017 14:08) (16 - 17)  SpO2: 95% (24 Nov 2017 14:08) (95% - 97%)    Affected extremity: b/l les         Dressing: clean/dry/intact          Sensation: intact to light touch to patient's baseline         Motor exam:   DP/PT 2+, EHL/TA/GS 5/5 b/l les              I&O's Detail    23 Nov 2017 07:01  -  24 Nov 2017 07:00  --------------------------------------------------------  IN:  Total IN: 0 mL    OUT:    Voided: 750 mL  Total OUT: 750 mL    Total NET: -750 mL    MEDICATIONS:    acetaminophen   Tablet 650 milliGRAM(s) Oral every 6 hours PRN  gabapentin 300 milliGRAM(s) Oral three times a day  morphine  - Injectable 4 milliGRAM(s) IV Push once  ondansetron Injectable 4 milliGRAM(s) IV Push every 6 hours PRN  oxyCODONE    IR 5 milliGRAM(s) Oral every 4 hours PRN  oxyCODONE    IR 10 milliGRAM(s) Oral every 4 hours PRN  sertraline 50 milliGRAM(s) Oral daily    aspirin enteric coated 325 milliGRAM(s) Oral two times a day      ASSESSMENT AND PLAN: 73yo  f s/p left tkr     1. Analgesic pain control  2. DVT prophylaxis: ASA     3. Weight Bearing Status:  Weight bearing as tolerated     4. Disposition: Home today  5. BP controlled 121/56, spoke to son and assured mother was stable today asx, and referred him to primary care md if he has further concerns about BP as outpt.

## 2017-11-27 DIAGNOSIS — M17.12 UNILATERAL PRIMARY OSTEOARTHRITIS, LEFT KNEE: ICD-10-CM

## 2017-11-27 DIAGNOSIS — K21.9 GASTRO-ESOPHAGEAL REFLUX DISEASE WITHOUT ESOPHAGITIS: ICD-10-CM

## 2017-11-27 DIAGNOSIS — M21.162 VARUS DEFORMITY, NOT ELSEWHERE CLASSIFIED, LEFT KNEE: ICD-10-CM

## 2017-11-27 DIAGNOSIS — E78.5 HYPERLIPIDEMIA, UNSPECIFIED: ICD-10-CM

## 2017-11-27 DIAGNOSIS — I10 ESSENTIAL (PRIMARY) HYPERTENSION: ICD-10-CM

## 2017-11-27 DIAGNOSIS — K59.00 CONSTIPATION, UNSPECIFIED: ICD-10-CM

## 2017-11-27 DIAGNOSIS — E66.9 OBESITY, UNSPECIFIED: ICD-10-CM

## 2017-11-27 DIAGNOSIS — F32.9 MAJOR DEPRESSIVE DISORDER, SINGLE EPISODE, UNSPECIFIED: ICD-10-CM

## 2017-11-27 DIAGNOSIS — F17.200 NICOTINE DEPENDENCE, UNSPECIFIED, UNCOMPLICATED: ICD-10-CM

## 2017-11-30 ENCOUNTER — RX RENEWAL (OUTPATIENT)
Age: 72
End: 2017-11-30

## 2017-12-01 ENCOUNTER — RX RENEWAL (OUTPATIENT)
Age: 72
End: 2017-12-01

## 2017-12-08 ENCOUNTER — MEDICATION RENEWAL (OUTPATIENT)
Age: 72
End: 2017-12-08

## 2017-12-12 ENCOUNTER — APPOINTMENT (OUTPATIENT)
Dept: ORTHOPEDIC SURGERY | Facility: CLINIC | Age: 72
End: 2017-12-12
Payer: MEDICARE

## 2017-12-12 PROCEDURE — 99024 POSTOP FOLLOW-UP VISIT: CPT

## 2017-12-12 RX ORDER — CELECOXIB 200 MG/1
200 CAPSULE ORAL TWICE DAILY
Qty: 84 | Refills: 0 | Status: DISCONTINUED | COMMUNITY
Start: 2017-11-20 | End: 2017-12-12

## 2018-01-12 ENCOUNTER — MEDICATION RENEWAL (OUTPATIENT)
Age: 73
End: 2018-01-12

## 2018-01-15 ENCOUNTER — FORM ENCOUNTER (OUTPATIENT)
Age: 73
End: 2018-01-15

## 2018-01-16 ENCOUNTER — OUTPATIENT (OUTPATIENT)
Dept: OUTPATIENT SERVICES | Facility: HOSPITAL | Age: 73
LOS: 1 days | End: 2018-01-16
Payer: MEDICARE

## 2018-01-16 ENCOUNTER — APPOINTMENT (OUTPATIENT)
Dept: ORTHOPEDIC SURGERY | Facility: CLINIC | Age: 73
End: 2018-01-16
Payer: MEDICARE

## 2018-01-16 DIAGNOSIS — Z96.641 PRESENCE OF RIGHT ARTIFICIAL HIP JOINT: Chronic | ICD-10-CM

## 2018-01-16 DIAGNOSIS — Z96.651 PRESENCE OF RIGHT ARTIFICIAL KNEE JOINT: Chronic | ICD-10-CM

## 2018-01-16 PROBLEM — I10 ESSENTIAL (PRIMARY) HYPERTENSION: Chronic | Status: ACTIVE | Noted: 2017-11-17

## 2018-01-16 PROCEDURE — 73564 X-RAY EXAM KNEE 4 OR MORE: CPT

## 2018-01-16 PROCEDURE — 99024 POSTOP FOLLOW-UP VISIT: CPT

## 2018-01-16 PROCEDURE — 73564 X-RAY EXAM KNEE 4 OR MORE: CPT | Mod: 26,LT

## 2018-01-16 RX ORDER — ASPIRIN/ACETAMINOPHEN/CAFFEINE 500-325-65
325 POWDER IN PACKET (EA) ORAL
Qty: 60 | Refills: 0 | Status: DISCONTINUED | COMMUNITY
Start: 2017-11-20 | End: 2018-01-16

## 2018-01-16 RX ORDER — OXYCODONE HYDROCHLORIDE 10 MG/1
10 TABLET, FILM COATED, EXTENDED RELEASE ORAL
Qty: 30 | Refills: 0 | Status: DISCONTINUED | COMMUNITY
Start: 2017-11-20 | End: 2018-01-16

## 2018-02-21 ENCOUNTER — TRANSCRIPTION ENCOUNTER (OUTPATIENT)
Age: 73
End: 2018-02-21

## 2018-02-21 ENCOUNTER — MEDICATION RENEWAL (OUTPATIENT)
Age: 73
End: 2018-02-21

## 2018-02-22 ENCOUNTER — TRANSCRIPTION ENCOUNTER (OUTPATIENT)
Age: 73
End: 2018-02-22

## 2018-02-26 ENCOUNTER — MEDICATION RENEWAL (OUTPATIENT)
Age: 73
End: 2018-02-26

## 2018-03-19 ENCOUNTER — APPOINTMENT (OUTPATIENT)
Dept: HEART AND VASCULAR | Facility: CLINIC | Age: 73
End: 2018-03-19
Payer: MEDICARE

## 2018-03-19 VITALS
SYSTOLIC BLOOD PRESSURE: 138 MMHG | HEART RATE: 54 BPM | OXYGEN SATURATION: 96 % | HEIGHT: 61.5 IN | WEIGHT: 175 LBS | TEMPERATURE: 98 F | DIASTOLIC BLOOD PRESSURE: 86 MMHG | BODY MASS INDEX: 32.62 KG/M2

## 2018-03-19 PROCEDURE — 99214 OFFICE O/P EST MOD 30 MIN: CPT | Mod: 25

## 2018-03-19 PROCEDURE — 36415 COLL VENOUS BLD VENIPUNCTURE: CPT

## 2018-03-19 RX ORDER — OXYCODONE AND ACETAMINOPHEN 5; 325 MG/1; MG/1
5-325 TABLET ORAL
Qty: 14 | Refills: 0 | Status: DISCONTINUED | COMMUNITY
Start: 2017-11-20 | End: 2018-03-19

## 2018-03-19 RX ORDER — OXYCODONE AND ACETAMINOPHEN 5; 325 MG/1; MG/1
5-325 TABLET ORAL
Qty: 50 | Refills: 0 | Status: DISCONTINUED | COMMUNITY
Start: 2017-12-12 | End: 2018-03-19

## 2018-03-20 LAB
ALBUMIN SERPL ELPH-MCNC: 4.6 G/DL
ALP BLD-CCNC: 129 U/L
ALT SERPL-CCNC: 9 U/L
ANION GAP SERPL CALC-SCNC: 14 MMOL/L
AST SERPL-CCNC: 18 U/L
BASOPHILS # BLD AUTO: 0.07 K/UL
BASOPHILS NFR BLD AUTO: 1.4 %
BILIRUB SERPL-MCNC: 0.4 MG/DL
BUN SERPL-MCNC: 13 MG/DL
CALCIUM SERPL-MCNC: 10.2 MG/DL
CHLORIDE SERPL-SCNC: 103 MMOL/L
CHOLEST SERPL-MCNC: 196 MG/DL
CHOLEST/HDLC SERPL: 3.9 RATIO
CO2 SERPL-SCNC: 26 MMOL/L
CREAT SERPL-MCNC: 0.78 MG/DL
EOSINOPHIL # BLD AUTO: 0.1 K/UL
EOSINOPHIL NFR BLD AUTO: 2 %
GLUCOSE SERPL-MCNC: 104 MG/DL
HBA1C MFR BLD HPLC: 6.1 %
HCT VFR BLD CALC: 44.4 %
HDLC SERPL-MCNC: 50 MG/DL
HGB BLD-MCNC: 13.5 G/DL
IMM GRANULOCYTES NFR BLD AUTO: 0.2 %
LDLC SERPL CALC-MCNC: 111 MG/DL
LYMPHOCYTES # BLD AUTO: 1.32 K/UL
LYMPHOCYTES NFR BLD AUTO: 26.9 %
MAN DIFF?: NORMAL
MCHC RBC-ENTMCNC: 29.7 PG
MCHC RBC-ENTMCNC: 30.4 GM/DL
MCV RBC AUTO: 97.6 FL
MONOCYTES # BLD AUTO: 0.28 K/UL
MONOCYTES NFR BLD AUTO: 5.7 %
NEUTROPHILS # BLD AUTO: 3.12 K/UL
NEUTROPHILS NFR BLD AUTO: 63.8 %
PLATELET # BLD AUTO: 306 K/UL
POTASSIUM SERPL-SCNC: 4.8 MMOL/L
PROT SERPL-MCNC: 7.7 G/DL
RBC # BLD: 4.55 M/UL
RBC # FLD: 13.6 %
SODIUM SERPL-SCNC: 143 MMOL/L
TRIGL SERPL-MCNC: 176 MG/DL
TSH SERPL-ACNC: 0.97 UIU/ML
WBC # FLD AUTO: 4.9 K/UL

## 2018-03-22 ENCOUNTER — RX RENEWAL (OUTPATIENT)
Age: 73
End: 2018-03-22

## 2018-04-25 ENCOUNTER — RX RENEWAL (OUTPATIENT)
Age: 73
End: 2018-04-25

## 2018-04-25 ENCOUNTER — TRANSCRIPTION ENCOUNTER (OUTPATIENT)
Age: 73
End: 2018-04-25

## 2018-05-14 ENCOUNTER — APPOINTMENT (OUTPATIENT)
Dept: ORTHOPEDIC SURGERY | Facility: CLINIC | Age: 73
End: 2018-05-14
Payer: MEDICARE

## 2018-05-14 DIAGNOSIS — M17.12 UNILATERAL PRIMARY OSTEOARTHRITIS, LEFT KNEE: ICD-10-CM

## 2018-05-14 PROCEDURE — 99214 OFFICE O/P EST MOD 30 MIN: CPT

## 2018-05-14 RX ORDER — ACETAMINOPHEN 325 MG/1
TABLET, FILM COATED ORAL
Refills: 0 | Status: DISCONTINUED | COMMUNITY
End: 2018-05-14

## 2018-05-21 ENCOUNTER — APPOINTMENT (OUTPATIENT)
Dept: ORTHOPEDIC SURGERY | Facility: CLINIC | Age: 73
End: 2018-05-21

## 2018-05-30 ENCOUNTER — FORM ENCOUNTER (OUTPATIENT)
Age: 73
End: 2018-05-30

## 2018-05-31 ENCOUNTER — APPOINTMENT (OUTPATIENT)
Dept: ULTRASOUND IMAGING | Facility: CLINIC | Age: 73
End: 2018-05-31
Payer: MEDICARE

## 2018-05-31 ENCOUNTER — OUTPATIENT (OUTPATIENT)
Dept: OUTPATIENT SERVICES | Facility: HOSPITAL | Age: 73
LOS: 1 days | End: 2018-05-31
Payer: MEDICARE

## 2018-05-31 DIAGNOSIS — Z96.641 PRESENCE OF RIGHT ARTIFICIAL HIP JOINT: Chronic | ICD-10-CM

## 2018-05-31 DIAGNOSIS — Z96.651 PRESENCE OF RIGHT ARTIFICIAL KNEE JOINT: Chronic | ICD-10-CM

## 2018-05-31 PROCEDURE — 20611 DRAIN/INJ JOINT/BURSA W/US: CPT | Mod: LT

## 2018-05-31 PROCEDURE — 20611 DRAIN/INJ JOINT/BURSA W/US: CPT

## 2018-06-04 ENCOUNTER — OTHER (OUTPATIENT)
Age: 73
End: 2018-06-04

## 2018-06-05 ENCOUNTER — APPOINTMENT (OUTPATIENT)
Dept: ORTHOPEDIC SURGERY | Facility: CLINIC | Age: 73
End: 2018-06-05
Payer: MEDICARE

## 2018-06-05 PROCEDURE — 99214 OFFICE O/P EST MOD 30 MIN: CPT

## 2018-06-11 ENCOUNTER — APPOINTMENT (OUTPATIENT)
Dept: ORTHOPEDIC SURGERY | Facility: CLINIC | Age: 73
End: 2018-06-11

## 2018-07-30 ENCOUNTER — RX RENEWAL (OUTPATIENT)
Age: 73
End: 2018-07-30

## 2018-08-13 ENCOUNTER — RX RENEWAL (OUTPATIENT)
Age: 73
End: 2018-08-13

## 2018-08-22 ENCOUNTER — APPOINTMENT (OUTPATIENT)
Dept: HEART AND VASCULAR | Facility: CLINIC | Age: 73
End: 2018-08-22
Payer: MEDICARE

## 2018-08-22 VITALS
BODY MASS INDEX: 32.99 KG/M2 | TEMPERATURE: 98.1 F | SYSTOLIC BLOOD PRESSURE: 114 MMHG | HEIGHT: 61.5 IN | HEART RATE: 65 BPM | DIASTOLIC BLOOD PRESSURE: 80 MMHG | OXYGEN SATURATION: 97 % | WEIGHT: 177 LBS

## 2018-08-22 DIAGNOSIS — R53.83 OTHER FATIGUE: ICD-10-CM

## 2018-08-22 DIAGNOSIS — Z80.0 FAMILY HISTORY OF MALIGNANT NEOPLASM OF DIGESTIVE ORGANS: ICD-10-CM

## 2018-08-22 PROCEDURE — 99214 OFFICE O/P EST MOD 30 MIN: CPT | Mod: 25

## 2018-08-22 PROCEDURE — 36415 COLL VENOUS BLD VENIPUNCTURE: CPT

## 2018-08-23 LAB
ALBUMIN SERPL ELPH-MCNC: 4.3 G/DL
ALP BLD-CCNC: 105 U/L
ALT SERPL-CCNC: 12 U/L
AMYLASE/CREAT SERPL: 90 U/L
ANION GAP SERPL CALC-SCNC: 15 MMOL/L
AST SERPL-CCNC: 20 U/L
BASOPHILS # BLD AUTO: 0.08 K/UL
BASOPHILS NFR BLD AUTO: 1.6 %
BILIRUB SERPL-MCNC: 0.4 MG/DL
BUN SERPL-MCNC: 11 MG/DL
CALCIUM SERPL-MCNC: 10.2 MG/DL
CHLORIDE SERPL-SCNC: 104 MMOL/L
CHOLEST SERPL-MCNC: 192 MG/DL
CHOLEST/HDLC SERPL: 4.1 RATIO
CO2 SERPL-SCNC: 25 MMOL/L
CREAT SERPL-MCNC: 0.89 MG/DL
EOSINOPHIL # BLD AUTO: 0.12 K/UL
EOSINOPHIL NFR BLD AUTO: 2.4 %
GLUCOSE SERPL-MCNC: 88 MG/DL
HBA1C MFR BLD HPLC: 6.1 %
HCT VFR BLD CALC: 43.5 %
HDLC SERPL-MCNC: 47 MG/DL
HGB BLD-MCNC: 13.2 G/DL
IMM GRANULOCYTES NFR BLD AUTO: 0.2 %
LDLC SERPL CALC-MCNC: 112 MG/DL
LPL SERPL-CCNC: 25 U/L
LYMPHOCYTES # BLD AUTO: 1.48 K/UL
LYMPHOCYTES NFR BLD AUTO: 30 %
MAN DIFF?: NORMAL
MCHC RBC-ENTMCNC: 29.4 PG
MCHC RBC-ENTMCNC: 30.3 GM/DL
MCV RBC AUTO: 96.9 FL
MONOCYTES # BLD AUTO: 0.29 K/UL
MONOCYTES NFR BLD AUTO: 5.9 %
NEUTROPHILS # BLD AUTO: 2.95 K/UL
NEUTROPHILS NFR BLD AUTO: 59.9 %
PLATELET # BLD AUTO: 320 K/UL
POTASSIUM SERPL-SCNC: 4.5 MMOL/L
PROT SERPL-MCNC: 7 G/DL
RBC # BLD: 4.49 M/UL
RBC # FLD: 14.4 %
SODIUM SERPL-SCNC: 144 MMOL/L
TRIGL SERPL-MCNC: 163 MG/DL
TSH SERPL-ACNC: 1.29 UIU/ML
WBC # FLD AUTO: 4.93 K/UL

## 2018-08-27 ENCOUNTER — APPOINTMENT (OUTPATIENT)
Dept: GASTROENTEROLOGY | Facility: CLINIC | Age: 73
End: 2018-08-27
Payer: MEDICARE

## 2018-08-27 VITALS
OXYGEN SATURATION: 98 % | HEART RATE: 55 BPM | RESPIRATION RATE: 12 BRPM | SYSTOLIC BLOOD PRESSURE: 160 MMHG | HEIGHT: 61.5 IN | TEMPERATURE: 97.7 F | WEIGHT: 178 LBS | DIASTOLIC BLOOD PRESSURE: 130 MMHG | BODY MASS INDEX: 33.18 KG/M2

## 2018-08-27 DIAGNOSIS — R10.9 UNSPECIFIED ABDOMINAL PAIN: ICD-10-CM

## 2018-08-27 DIAGNOSIS — Z12.11 ENCOUNTER FOR SCREENING FOR MALIGNANT NEOPLASM OF COLON: ICD-10-CM

## 2018-08-27 DIAGNOSIS — E66.9 OBESITY, UNSPECIFIED: ICD-10-CM

## 2018-08-27 PROCEDURE — 99204 OFFICE O/P NEW MOD 45 MIN: CPT

## 2018-09-04 ENCOUNTER — RESULT REVIEW (OUTPATIENT)
Age: 73
End: 2018-09-04

## 2018-09-04 ENCOUNTER — OUTPATIENT (OUTPATIENT)
Dept: OUTPATIENT SERVICES | Facility: HOSPITAL | Age: 73
LOS: 1 days | Discharge: HOME | End: 2018-09-04
Payer: MEDICARE

## 2018-09-04 ENCOUNTER — APPOINTMENT (OUTPATIENT)
Dept: GASTROENTEROLOGY | Facility: HOSPITAL | Age: 73
End: 2018-09-04

## 2018-09-04 DIAGNOSIS — Z96.641 PRESENCE OF RIGHT ARTIFICIAL HIP JOINT: Chronic | ICD-10-CM

## 2018-09-04 DIAGNOSIS — Z96.651 PRESENCE OF RIGHT ARTIFICIAL KNEE JOINT: Chronic | ICD-10-CM

## 2018-09-04 PROCEDURE — 43239 EGD BIOPSY SINGLE/MULTIPLE: CPT

## 2018-09-04 PROCEDURE — 45385 COLONOSCOPY W/LESION REMOVAL: CPT

## 2018-09-04 PROCEDURE — 88305 TISSUE EXAM BY PATHOLOGIST: CPT

## 2018-09-05 LAB — SURGICAL PATHOLOGY STUDY: SIGNIFICANT CHANGE UP

## 2018-10-08 ENCOUNTER — RX RENEWAL (OUTPATIENT)
Age: 73
End: 2018-10-08

## 2018-10-10 ENCOUNTER — APPOINTMENT (OUTPATIENT)
Dept: HEART AND VASCULAR | Facility: CLINIC | Age: 73
End: 2018-10-10
Payer: MEDICARE

## 2018-10-10 VITALS
BODY MASS INDEX: 33.18 KG/M2 | HEIGHT: 61.5 IN | TEMPERATURE: 98 F | HEART RATE: 58 BPM | WEIGHT: 178 LBS | OXYGEN SATURATION: 97 % | SYSTOLIC BLOOD PRESSURE: 142 MMHG | DIASTOLIC BLOOD PRESSURE: 90 MMHG

## 2018-10-10 PROCEDURE — 90662 IIV NO PRSV INCREASED AG IM: CPT

## 2018-10-10 PROCEDURE — 99214 OFFICE O/P EST MOD 30 MIN: CPT

## 2018-10-10 PROCEDURE — G0008: CPT

## 2018-10-21 ENCOUNTER — FORM ENCOUNTER (OUTPATIENT)
Age: 73
End: 2018-10-21

## 2018-10-22 ENCOUNTER — OUTPATIENT (OUTPATIENT)
Dept: OUTPATIENT SERVICES | Facility: HOSPITAL | Age: 73
LOS: 1 days | End: 2018-10-22
Payer: MEDICARE

## 2018-10-22 ENCOUNTER — APPOINTMENT (OUTPATIENT)
Dept: MAMMOGRAPHY | Facility: CLINIC | Age: 73
End: 2018-10-22
Payer: MEDICARE

## 2018-10-22 DIAGNOSIS — Z96.651 PRESENCE OF RIGHT ARTIFICIAL KNEE JOINT: Chronic | ICD-10-CM

## 2018-10-22 DIAGNOSIS — Z96.641 PRESENCE OF RIGHT ARTIFICIAL HIP JOINT: Chronic | ICD-10-CM

## 2018-10-22 PROCEDURE — 77067 SCR MAMMO BI INCL CAD: CPT | Mod: 26

## 2018-10-22 PROCEDURE — 77063 BREAST TOMOSYNTHESIS BI: CPT

## 2018-10-22 PROCEDURE — 77067 SCR MAMMO BI INCL CAD: CPT

## 2018-10-22 PROCEDURE — 77063 BREAST TOMOSYNTHESIS BI: CPT | Mod: 26

## 2018-11-02 ENCOUNTER — TRANSCRIPTION ENCOUNTER (OUTPATIENT)
Age: 73
End: 2018-11-02

## 2018-11-02 ENCOUNTER — RX RENEWAL (OUTPATIENT)
Age: 73
End: 2018-11-02

## 2018-11-02 ENCOUNTER — RX CHANGE (OUTPATIENT)
Age: 73
End: 2018-11-02

## 2018-11-20 ENCOUNTER — APPOINTMENT (OUTPATIENT)
Dept: HEART AND VASCULAR | Facility: CLINIC | Age: 73
End: 2018-11-20
Payer: MEDICARE

## 2018-11-20 VITALS
TEMPERATURE: 98.2 F | HEART RATE: 60 BPM | RESPIRATION RATE: 12 BRPM | OXYGEN SATURATION: 97 % | HEIGHT: 65 IN | SYSTOLIC BLOOD PRESSURE: 130 MMHG | DIASTOLIC BLOOD PRESSURE: 80 MMHG

## 2018-11-20 PROCEDURE — 99214 OFFICE O/P EST MOD 30 MIN: CPT

## 2018-11-20 NOTE — DISCUSSION/SUMMARY
[FreeTextEntry1] : HTN- stable now, cont current meds, symptoms related to stress and anxiety, discussed with pt and son

## 2018-11-20 NOTE — REVIEW OF SYSTEMS
[Feeling Fatigued] : feeling fatigued [Shortness Of Breath] : no shortness of breath [Joint Pain] : joint pain [Anxiety] : anxiety [Under Stress] : under stress [Negative] : Heme/Lymph [FreeTextEntry1] : poor sleep

## 2018-11-20 NOTE — PHYSICAL EXAM
[General Appearance - Well Developed] : well developed [Normal Appearance] : normal appearance [Well Groomed] : well groomed [General Appearance - Well Nourished] : well nourished [No Deformities] : no deformities [General Appearance - In No Acute Distress] : no acute distress [Normal Conjunctiva] : the conjunctiva exhibited no abnormalities [Eyelids - No Xanthelasma] : the eyelids demonstrated no xanthelasmas [Normal Oral Mucosa] : normal oral mucosa [No Oral Pallor] : no oral pallor [No Oral Cyanosis] : no oral cyanosis [Normal Jugular Venous A Waves Present] : normal jugular venous A waves present [Normal Jugular Venous V Waves Present] : normal jugular venous V waves present [No Jugular Venous Samaniego A Waves] : no jugular venous samaniego A waves [Respiration, Rhythm And Depth] : normal respiratory rhythm and effort [Exaggerated Use Of Accessory Muscles For Inspiration] : no accessory muscle use [Auscultation Breath Sounds / Voice Sounds] : lungs were clear to auscultation bilaterally [Heart Rate And Rhythm] : heart rate and rhythm were normal [Heart Sounds] : normal S1 and S2 [Murmurs] : no murmurs present [Abdomen Soft] : soft [Abdomen Tenderness] : non-tender [Abdomen Mass (___ Cm)] : no abdominal mass palpated [Gait - Sufficient For Exercise Testing] : the gait was sufficient for exercise testing [FreeTextEntry1] : frozen L shoulder [Nail Clubbing] : no clubbing of the fingernails [Cyanosis, Localized] : no localized cyanosis [Petechial Hemorrhages (___cm)] : no petechial hemorrhages [] : no ischemic changes [Oriented To Time, Place, And Person] : oriented to person, place, and time [Affect] : the affect was normal [Mood] : the mood was normal [No Anxiety] : not feeling anxious

## 2018-11-27 ENCOUNTER — RX RENEWAL (OUTPATIENT)
Age: 73
End: 2018-11-27

## 2019-01-07 ENCOUNTER — RX RENEWAL (OUTPATIENT)
Age: 74
End: 2019-01-07

## 2019-01-17 ENCOUNTER — APPOINTMENT (OUTPATIENT)
Dept: ORTHOPEDIC SURGERY | Facility: CLINIC | Age: 74
End: 2019-01-17
Payer: MEDICARE

## 2019-01-17 VITALS — HEIGHT: 65 IN | WEIGHT: 179 LBS | BODY MASS INDEX: 29.82 KG/M2

## 2019-01-17 PROCEDURE — 20611 DRAIN/INJ JOINT/BURSA W/US: CPT | Mod: LT

## 2019-01-17 PROCEDURE — 99214 OFFICE O/P EST MOD 30 MIN: CPT | Mod: 25

## 2019-01-22 NOTE — PHYSICAL EXAM
[FreeTextEntry2] : General: Patient is awake and alert, demonstrates appropriate mood and affect, exhibists normal breathing and is in no acute distress.\par Skin: The skin is intact, warm, pink, and dry over the area examined.\par Lymph: There is no lymphedema except as noted below. \par Cardiovascular: There is brisk capillary refill in the digits of the affected extremity. They are symmetric pulses in the bilateral upper and lower extremities. \par Respiratory: The patient is in no apparent respiratory distress. They're taking full deep breaths without use of accessory muscles or evidence of audible wheezes or stridor without the use of a stethoscope. \par \par Cervical Spine:\par FROM Flexion/extension/lateral rotation and bending without radicular signs\par Negative spurlings maneuver\par Shoulder Exam:\par ROM:      			Left\par FF: 			[90]\par ABD:    			[75]\par ER:        			[20] with crepitus\par \par \par Strength:      		  Left\par Supra:            		[4+/5] pain\par Infra:               		[4+/5] pain\par Teres:             		could not assess\par Subscap:        		[4/5]\par \par [Left] Shoulder\par \par Skin: [intact], erythema [No]\par \par TTP:\par AC: [No]\par SC: [No]\par Clavicle: [No]\par Trapezius: [No]\par Greater Tuberosity : [No]\par \par Motor: AIN/PIN/M/R/U intact\par Sens: M/R/U/Ax intact to light touch\par Vasc: BCR, 2+ Radial and Ulnar Pulses\par \par \par  [de-identified] : 3V left shoulder: Severe glenohumeral arthritis

## 2019-01-22 NOTE — HISTORY OF PRESENT ILLNESS
[All Other ROS Normal] : All other review of systems are negative except as noted [Joint Pain] : joint pain [Joint Stiffness] : joint stiffness [FreeTextEntry2] : Referring Provider: PCP: Lauri Hsu MD\par Chief Complaint: Left Shoulder DJD\par Date of Injury/onset: 7 years\par \par \par This is a 73-year-old female, right-hand-dominant, with many years of left-sided shoulder pain and known diagnosis of left shoulder arthritis. She has previously had knee and hip replacements and has been managed with steroid injections by my partners which have lasted her for approximately 3 months.\par \par The patient reports she is tired of her ongoing pain in her left shoulder. She complains about limited range of motion and weakness. Summary of symptoms is as below\par \par Her last injection was 5 months ago\par \par SANE Score: 20\par \par \par Summary of symptoms:\par Severity of Pain:  10/10\par Character of Pain: sharp\par What and when makes pain worse: movement\par Associated symptoms: swelling\par Alleviating factors: has had injections in the past by Dr. Cruz, 5 months ago.\par \par \par -------------\par \par Review of Systems:\par Constitutional:		       ENT:			                   Eyes\par Good General Health        [Yes]     Hearing Loss/Ringing            [No]        Wear Glasses/contact       [No]\par Recent Weight Change     [No]       Sinus Problems                     [No]        Blurred/double Vision         [No]\par Night Sweats, Fevers       [No]       Nose Bleeds                         [No]        Eye Disease or Injury         [No]\par Fatigue                              [No]       Sore Throat/Voice Change   [No]        Glaucoma                           [No]\par \par Cardiovascular		       Respiratory		                  Gastrointestinal\par Chest Pain                         [No]       Shortness of Breath             [Yes]       Nausea/Vomiting                  [No]\par Palpitations                        [No]       Cough                                    [No]       Abdominal Pain                    [No]\par Heart Trouble                    [Yes]        Wheezing/Asthma                [No]       Rectal Bleeding                    [No]\par Swelling hands/feet          [Yes]       Coughing up Blood                [No]       Bowel Problems                   [No]\par \par Musculoskeletal		       Neurological		                   Integumentary\par Muscle Pain or Cramps     [Yes]       Frequent Headaches            [No]       Change in hair or nails         [Yes]\par Stiffness/swelling joints   [Yes]       Paralysis or Tremors             [No]       Rashes or itching                [Yes]\par Joint Pain                           [Yes]      Convulsions/Seizures            [No]       Breast Lump                        [No]\par Trouble Walking                 [Yes]      Numbness/Tingling                 [No]       Breast pain or discharge    [No]\par \par Endocrine		      Hematologic/Lymphatic	                   Allergic/Immunologic\par Excessive thirst/urination  [No]     Bruise easily                          [No]        Food Allergies                      [No]\par Thyroid Disease                [No]     Slow to heal                           [No]        Aspirin Allergies                  [No]\par Hormone Problem              [No]     Enlarged glands                     [No]        Antibiotic Allergies               [No]\par \par  - Male			       - Female		                   Psychiatric\par Blood in Urine                    [No]      Blood in Urine                         [No]       Insomnia                              [Yes]\par Kidney Stone                     [No]      Kidney Stone                          [No]      Confusion/memory loss       [No]\par Sexual Problems                [No]      Sexual Problems                    [No]       Depression                          [No]\par Testicular Pain                   [No]       Menstrual Problems               [No]\par \par \par Please refer to the attached intake form for additional history and details. \par \par

## 2019-01-22 NOTE — DISCUSSION/SUMMARY
[de-identified] : Assessment: 73-year-old female, right-hand-dominant, with left shoulder glenohumeral arthritis which has been steadily worsening. She has a seen score of 20. She is tired of receiving injections which only help her for about 3 months.\par \par We discussed treatment options including ongoing injection, anti-inflammatories and joint replacement.\par \par Patient would like an injection today which was performed without complication she would also like to consider a reverse total shoulder arthroplasty over the summer we'll see the patient back in 2 months and if she wishes to proceed we will begin scheduling.

## 2019-01-22 NOTE — PROCEDURE
[de-identified] : Procedure: Kenolog injection of the left shoulder joint under ultrasound guidance\par Indication:  [Inflammation] and [Joint pain]. \par Risk, benefits and alternatives were discussed with the patient. Potential complications include bleeding and infection as well as the risk of increased blood sugar.\par Chlorhexidine was used to prep the area.  Ethyl chloride spray was used as a topical anesthetic. The site was then reprepped with chlorhexidine. Using sterile technique, the aspiration/injection needle was then directed [from a lateral and superior aspect].  A 1.5 inch [22]g needle was used to inject [3] mL of 1% Lidocaine, [3] mL 0.25% Bupivacaine and [1] mL 40mg/mL triamcinolone.  A bandage was applied.  The patient tolerated the procedure well. \par Complications: None. \par Patient instructed to avoid strenuous activity for 2 day(s). \par \par Specifically counseled regarding the signs and symptoms of potential infection and instructed to present promptly to clinic or hospital if such signs and symptoms arise.

## 2019-03-12 ENCOUNTER — APPOINTMENT (OUTPATIENT)
Dept: HEART AND VASCULAR | Facility: CLINIC | Age: 74
End: 2019-03-12
Payer: MEDICARE

## 2019-03-12 VITALS
RESPIRATION RATE: 12 BRPM | TEMPERATURE: 97.9 F | DIASTOLIC BLOOD PRESSURE: 80 MMHG | WEIGHT: 176 LBS | HEART RATE: 54 BPM | SYSTOLIC BLOOD PRESSURE: 140 MMHG | OXYGEN SATURATION: 96 % | HEIGHT: 65 IN | BODY MASS INDEX: 29.32 KG/M2

## 2019-03-12 DIAGNOSIS — R42 DIZZINESS AND GIDDINESS: ICD-10-CM

## 2019-03-12 DIAGNOSIS — R26.89 OTHER ABNORMALITIES OF GAIT AND MOBILITY: ICD-10-CM

## 2019-03-12 PROCEDURE — 99214 OFFICE O/P EST MOD 30 MIN: CPT

## 2019-03-12 RX ORDER — MULTIVIT-MIN/FOLIC/VIT K/LYCOP 400-300MCG
25 MCG TABLET ORAL
Qty: 180 | Refills: 3 | Status: ACTIVE | COMMUNITY
Start: 1900-01-01 | End: 1900-01-01

## 2019-03-12 NOTE — PHYSICAL EXAM
[General Appearance - Well Developed] : well developed [Normal Appearance] : normal appearance [Well Groomed] : well groomed [General Appearance - Well Nourished] : well nourished [No Deformities] : no deformities [General Appearance - In No Acute Distress] : no acute distress [Normal Conjunctiva] : the conjunctiva exhibited no abnormalities [Eyelids - No Xanthelasma] : the eyelids demonstrated no xanthelasmas [Normal Oral Mucosa] : normal oral mucosa [No Oral Pallor] : no oral pallor [No Oral Cyanosis] : no oral cyanosis [Normal Jugular Venous A Waves Present] : normal jugular venous A waves present [Normal Jugular Venous V Waves Present] : normal jugular venous V waves present [No Jugular Venous Samaniego A Waves] : no jugular venous samaniego A waves [Respiration, Rhythm And Depth] : normal respiratory rhythm and effort [Exaggerated Use Of Accessory Muscles For Inspiration] : no accessory muscle use [Auscultation Breath Sounds / Voice Sounds] : lungs were clear to auscultation bilaterally [Heart Rate And Rhythm] : heart rate and rhythm were normal [Heart Sounds] : normal S1 and S2 [Murmurs] : no murmurs present [Abdomen Soft] : soft [Abdomen Tenderness] : non-tender [Abdomen Mass (___ Cm)] : no abdominal mass palpated [FreeTextEntry1] : frozen L shoulder, slow gait, cane [Nail Clubbing] : no clubbing of the fingernails [Cyanosis, Localized] : no localized cyanosis [Petechial Hemorrhages (___cm)] : no petechial hemorrhages [] : no ischemic changes [Oriented To Time, Place, And Person] : oriented to person, place, and time [Affect] : the affect was normal [Mood] : the mood was normal [No Anxiety] : not feeling anxious

## 2019-03-12 NOTE — HISTORY OF PRESENT ILLNESS
[FreeTextEntry1] : vertigo- noted room spinning with lying back and getting up quickly\par poor balance- noted with walking feels gait unsteady at times

## 2019-03-12 NOTE — REVIEW OF SYSTEMS
[Feeling Fatigued] : feeling fatigued [Shortness Of Breath] : no shortness of breath [Joint Pain] : joint pain [see HPI] : see HPI [Under Stress] : not under stress [Negative] : Heme/Lymph [FreeTextEntry1] : poor sleep

## 2019-03-17 ENCOUNTER — TRANSCRIPTION ENCOUNTER (OUTPATIENT)
Age: 74
End: 2019-03-17

## 2019-04-03 ENCOUNTER — FORM ENCOUNTER (OUTPATIENT)
Age: 74
End: 2019-04-03

## 2019-04-04 ENCOUNTER — APPOINTMENT (OUTPATIENT)
Dept: RADIOLOGY | Facility: CLINIC | Age: 74
End: 2019-04-04
Payer: MEDICARE

## 2019-04-04 ENCOUNTER — OUTPATIENT (OUTPATIENT)
Dept: OUTPATIENT SERVICES | Facility: HOSPITAL | Age: 74
LOS: 1 days | End: 2019-04-04

## 2019-04-04 ENCOUNTER — APPOINTMENT (OUTPATIENT)
Dept: ORTHOPEDIC SURGERY | Facility: CLINIC | Age: 74
End: 2019-04-04
Payer: MEDICARE

## 2019-04-04 VITALS — BODY MASS INDEX: 29.32 KG/M2 | WEIGHT: 176 LBS | HEIGHT: 65 IN

## 2019-04-04 DIAGNOSIS — Z96.641 PRESENCE OF RIGHT ARTIFICIAL HIP JOINT: Chronic | ICD-10-CM

## 2019-04-04 DIAGNOSIS — Z96.651 PRESENCE OF RIGHT ARTIFICIAL KNEE JOINT: Chronic | ICD-10-CM

## 2019-04-04 PROCEDURE — 73030 X-RAY EXAM OF SHOULDER: CPT | Mod: 26,LT

## 2019-04-04 PROCEDURE — 99215 OFFICE O/P EST HI 40 MIN: CPT

## 2019-04-05 ENCOUNTER — FORM ENCOUNTER (OUTPATIENT)
Age: 74
End: 2019-04-05

## 2019-04-06 ENCOUNTER — APPOINTMENT (OUTPATIENT)
Dept: CT IMAGING | Facility: HOSPITAL | Age: 74
End: 2019-04-06

## 2019-04-06 ENCOUNTER — OUTPATIENT (OUTPATIENT)
Dept: OUTPATIENT SERVICES | Facility: HOSPITAL | Age: 74
LOS: 1 days | End: 2019-04-06
Payer: MEDICARE

## 2019-04-06 DIAGNOSIS — Z96.641 PRESENCE OF RIGHT ARTIFICIAL HIP JOINT: Chronic | ICD-10-CM

## 2019-04-06 DIAGNOSIS — Z96.651 PRESENCE OF RIGHT ARTIFICIAL KNEE JOINT: Chronic | ICD-10-CM

## 2019-04-06 PROCEDURE — 73200 CT UPPER EXTREMITY W/O DYE: CPT

## 2019-04-06 PROCEDURE — 73200 CT UPPER EXTREMITY W/O DYE: CPT | Mod: 26,LT

## 2019-04-18 ENCOUNTER — APPOINTMENT (OUTPATIENT)
Dept: ORTHOPEDIC SURGERY | Facility: CLINIC | Age: 74
End: 2019-04-18

## 2019-04-22 ENCOUNTER — RX RENEWAL (OUTPATIENT)
Age: 74
End: 2019-04-22

## 2019-05-16 ENCOUNTER — RX RENEWAL (OUTPATIENT)
Age: 74
End: 2019-05-16

## 2019-05-20 ENCOUNTER — RX RENEWAL (OUTPATIENT)
Age: 74
End: 2019-05-20

## 2019-05-29 ENCOUNTER — APPOINTMENT (OUTPATIENT)
Dept: HEART AND VASCULAR | Facility: CLINIC | Age: 74
End: 2019-05-29

## 2019-05-29 ENCOUNTER — APPOINTMENT (OUTPATIENT)
Dept: HEART AND VASCULAR | Facility: CLINIC | Age: 74
End: 2019-05-29
Payer: MEDICARE

## 2019-05-29 VITALS
SYSTOLIC BLOOD PRESSURE: 120 MMHG | HEART RATE: 53 BPM | DIASTOLIC BLOOD PRESSURE: 80 MMHG | OXYGEN SATURATION: 98 % | TEMPERATURE: 98 F | WEIGHT: 180 LBS | HEIGHT: 65 IN | RESPIRATION RATE: 12 BRPM | BODY MASS INDEX: 29.99 KG/M2

## 2019-05-29 DIAGNOSIS — R06.02 SHORTNESS OF BREATH: ICD-10-CM

## 2019-05-29 PROCEDURE — 36415 COLL VENOUS BLD VENIPUNCTURE: CPT

## 2019-05-29 PROCEDURE — 93000 ELECTROCARDIOGRAM COMPLETE: CPT

## 2019-05-29 PROCEDURE — 99214 OFFICE O/P EST MOD 30 MIN: CPT

## 2019-05-29 PROCEDURE — 93306 TTE W/DOPPLER COMPLETE: CPT

## 2019-05-29 NOTE — REVIEW OF SYSTEMS
[Feeling Fatigued] : not feeling fatigued [Shortness Of Breath] : no shortness of breath [Joint Pain] : joint pain [see HPI] : see HPI [Dizziness] : dizziness [Under Stress] : not under stress [Negative] : Heme/Lymph [FreeTextEntry1] : poor sleep

## 2019-05-29 NOTE — PHYSICAL EXAM
[General Appearance - Well Developed] : well developed [Normal Appearance] : normal appearance [Well Groomed] : well groomed [General Appearance - Well Nourished] : well nourished [No Deformities] : no deformities [General Appearance - In No Acute Distress] : no acute distress [Normal Conjunctiva] : the conjunctiva exhibited no abnormalities [Eyelids - No Xanthelasma] : the eyelids demonstrated no xanthelasmas [Normal Oral Mucosa] : normal oral mucosa [No Oral Cyanosis] : no oral cyanosis [No Oral Pallor] : no oral pallor [Normal Jugular Venous V Waves Present] : normal jugular venous V waves present [Normal Jugular Venous A Waves Present] : normal jugular venous A waves present [No Jugular Venous Samaniego A Waves] : no jugular venous samaniego A waves [Respiration, Rhythm And Depth] : normal respiratory rhythm and effort [Exaggerated Use Of Accessory Muscles For Inspiration] : no accessory muscle use [Auscultation Breath Sounds / Voice Sounds] : lungs were clear to auscultation bilaterally [Heart Sounds] : normal S1 and S2 [Heart Rate And Rhythm] : heart rate and rhythm were normal [Murmurs] : no murmurs present [Abdomen Soft] : soft [Abdomen Tenderness] : non-tender [Abdomen Mass (___ Cm)] : no abdominal mass palpated [FreeTextEntry1] : frozen L shoulder, slow gait, walker [Cyanosis, Localized] : no localized cyanosis [Nail Clubbing] : no clubbing of the fingernails [] : no ischemic changes [Petechial Hemorrhages (___cm)] : no petechial hemorrhages [Oriented To Time, Place, And Person] : oriented to person, place, and time [Affect] : the affect was normal [Mood] : the mood was normal [No Anxiety] : not feeling anxious

## 2019-05-29 NOTE — HISTORY OF PRESENT ILLNESS
[FreeTextEntry1] : for shoulder surgery\par no h/o anesthesia reaction\par no h/o bleeding tendencies

## 2019-05-30 LAB
ALBUMIN SERPL ELPH-MCNC: 4.4 G/DL
ALP BLD-CCNC: 116 U/L
ALT SERPL-CCNC: 11 U/L
ANION GAP SERPL CALC-SCNC: 11 MMOL/L
APTT BLD: 30.1 SEC
AST SERPL-CCNC: 18 U/L
BASOPHILS # BLD AUTO: 0.08 K/UL
BASOPHILS NFR BLD AUTO: 1.6 %
BILIRUB SERPL-MCNC: 0.3 MG/DL
BUN SERPL-MCNC: 16 MG/DL
CALCIUM SERPL-MCNC: 10 MG/DL
CHLORIDE SERPL-SCNC: 103 MMOL/L
CO2 SERPL-SCNC: 26 MMOL/L
CREAT SERPL-MCNC: 0.98 MG/DL
EOSINOPHIL # BLD AUTO: 0.19 K/UL
EOSINOPHIL NFR BLD AUTO: 3.9 %
ESTIMATED AVERAGE GLUCOSE: 123 MG/DL
GLUCOSE SERPL-MCNC: 86 MG/DL
HBA1C MFR BLD HPLC: 5.9 %
HCT VFR BLD CALC: 40.3 %
HGB BLD-MCNC: 12.2 G/DL
IMM GRANULOCYTES NFR BLD AUTO: 0.2 %
INR PPP: 0.92 RATIO
LYMPHOCYTES # BLD AUTO: 1.59 K/UL
LYMPHOCYTES NFR BLD AUTO: 32.8 %
MAN DIFF?: NORMAL
MCHC RBC-ENTMCNC: 28.9 PG
MCHC RBC-ENTMCNC: 30.3 GM/DL
MCV RBC AUTO: 95.5 FL
MONOCYTES # BLD AUTO: 0.37 K/UL
MONOCYTES NFR BLD AUTO: 7.6 %
NEUTROPHILS # BLD AUTO: 2.61 K/UL
NEUTROPHILS NFR BLD AUTO: 53.9 %
PLATELET # BLD AUTO: 291 K/UL
POTASSIUM SERPL-SCNC: 4.4 MMOL/L
PROT SERPL-MCNC: 7.1 G/DL
PT BLD: 10.5 SEC
RBC # BLD: 4.22 M/UL
RBC # FLD: 14 %
SODIUM SERPL-SCNC: 140 MMOL/L
WBC # FLD AUTO: 4.85 K/UL

## 2019-05-31 LAB
APPEARANCE: ABNORMAL
BACTERIA: ABNORMAL
BILIRUBIN URINE: NEGATIVE
BLOOD URINE: NEGATIVE
COLOR: YELLOW
GLUCOSE QUALITATIVE U: NEGATIVE
HYALINE CASTS: 0 /LPF
KETONES URINE: NEGATIVE
LEUKOCYTE ESTERASE URINE: ABNORMAL
MICROSCOPIC-UA: NORMAL
NITRITE URINE: NEGATIVE
PH URINE: 6
PROTEIN URINE: NEGATIVE
RED BLOOD CELLS URINE: 1 /HPF
SPECIFIC GRAVITY URINE: 1.02
SQUAMOUS EPITHELIAL CELLS: 6 /HPF
UROBILINOGEN URINE: NORMAL
WHITE BLOOD CELLS URINE: 8 /HPF

## 2019-06-04 NOTE — H&P ADULT - NSHPLABSRESULTS_GEN_ALL_CORE
preop CBC/CMP/PT/INR/PTT - wnl reviewed by medical clearance  Preop UA: large leukocyte esterase, will repeat day of surgery   EKG: sinus bradycardia with 1st degree AV block, reviewed by medical clearance  Echo 5/29/19: normal LVEF, reviewed by medical clearance

## 2019-06-04 NOTE — H&P ADULT - PROBLEM SELECTOR PLAN 1
Admit to Orthopaedic Service.  Presents today for elective  left reverse total shoulder arthroplasty  Pt medically stable and cleared for procedure today by  Admit to Orthopaedic Service.  Presents today for elective  left reverse total shoulder arthroplasty  Pt medically stable and cleared for procedure today by Dr. Seymour

## 2019-06-04 NOTE — H&P ADULT - NSICDXPASTSURGICALHX_GEN_ALL_CORE_FT
PAST SURGICAL HISTORY:  History of right hip replacement     History of total right knee replacement

## 2019-06-04 NOTE — H&P ADULT - NSHPPHYSICALEXAM_GEN_ALL_CORE
MSK:   decreased range of motion left shoulder 2/2 pain  Remainder of physical exam as per medical clearance note MSK: skin warm and well perfused. Radial pulses palpable bilateral upper extremities. Sensation intact and equal to bilateral upper extremities. PIN/AIN/ulnar nerves intact. /biceps/triceps strength intact bilateral upper extremities although somewhat decreased to left UE 2/2 pain.   decreased range of motion left shoulder 2/2 pain  Remainder of physical exam as per medical clearance note

## 2019-06-04 NOTE — H&P ADULT - HISTORY OF PRESENT ILLNESS
73 year old female presents with left shoulder pain x   Presents today for elective left reverse total shoulder arthroplasty 73 year old female presents with left shoulder pain x 3 years without preceding accident, injury or trauma. The patient complains of left shoulder pain which radiates down the left arm and is occasionally associated with numbness/tingling. The patient failed conservative therapies for her symptoms including steroid injections. The patient typically takes tylenol/meloxicam for pain. Denies hx of DVT.   Presents today for elective left reverse total shoulder arthroplasty

## 2019-06-05 ENCOUNTER — INPATIENT (INPATIENT)
Facility: HOSPITAL | Age: 74
LOS: 1 days | Discharge: HOME HEALTH | DRG: 483 | End: 2019-06-07
Attending: ORTHOPAEDIC SURGERY | Admitting: ORTHOPAEDIC SURGERY
Payer: MEDICARE

## 2019-06-05 ENCOUNTER — APPOINTMENT (OUTPATIENT)
Dept: ORTHOPEDIC SURGERY | Facility: HOSPITAL | Age: 74
End: 2019-06-05
Payer: MEDICARE

## 2019-06-05 VITALS
HEIGHT: 61 IN | OXYGEN SATURATION: 98 % | WEIGHT: 178.35 LBS | SYSTOLIC BLOOD PRESSURE: 124 MMHG | TEMPERATURE: 98 F | HEART RATE: 56 BPM | DIASTOLIC BLOOD PRESSURE: 71 MMHG | RESPIRATION RATE: 20 BRPM

## 2019-06-05 DIAGNOSIS — I10 ESSENTIAL (PRIMARY) HYPERTENSION: ICD-10-CM

## 2019-06-05 DIAGNOSIS — M25.512 PAIN IN LEFT SHOULDER: ICD-10-CM

## 2019-06-05 DIAGNOSIS — Z96.651 PRESENCE OF RIGHT ARTIFICIAL KNEE JOINT: Chronic | ICD-10-CM

## 2019-06-05 DIAGNOSIS — Z96.641 PRESENCE OF RIGHT ARTIFICIAL HIP JOINT: Chronic | ICD-10-CM

## 2019-06-05 PROCEDURE — 23472 RECONSTRUCT SHOULDER JOINT: CPT | Mod: 80,LT

## 2019-06-05 PROCEDURE — 23472 RECONSTRUCT SHOULDER JOINT: CPT | Mod: LT

## 2019-06-05 RX ORDER — DOCUSATE SODIUM 100 MG
100 CAPSULE ORAL THREE TIMES A DAY
Refills: 0 | Status: DISCONTINUED | OUTPATIENT
Start: 2019-06-05 | End: 2019-06-07

## 2019-06-05 RX ORDER — BUPIVACAINE 13.3 MG/ML
20 INJECTION, SUSPENSION, LIPOSOMAL INFILTRATION ONCE
Refills: 0 | Status: DISCONTINUED | OUTPATIENT
Start: 2019-06-05 | End: 2019-06-07

## 2019-06-05 RX ORDER — ATENOLOL 25 MG/1
25 TABLET ORAL AT BEDTIME
Refills: 0 | Status: DISCONTINUED | OUTPATIENT
Start: 2019-06-05 | End: 2019-06-07

## 2019-06-05 RX ORDER — SENNA PLUS 8.6 MG/1
2 TABLET ORAL AT BEDTIME
Refills: 0 | Status: DISCONTINUED | OUTPATIENT
Start: 2019-06-05 | End: 2019-06-07

## 2019-06-05 RX ORDER — GABAPENTIN 400 MG/1
300 CAPSULE ORAL THREE TIMES A DAY
Refills: 0 | Status: DISCONTINUED | OUTPATIENT
Start: 2019-06-05 | End: 2019-06-07

## 2019-06-05 RX ORDER — ERGOCALCIFEROL 1.25 MG/1
1000 CAPSULE ORAL DAILY
Refills: 0 | Status: DISCONTINUED | OUTPATIENT
Start: 2019-06-05 | End: 2019-06-07

## 2019-06-05 RX ORDER — SERTRALINE 25 MG/1
50 TABLET, FILM COATED ORAL DAILY
Refills: 0 | Status: DISCONTINUED | OUTPATIENT
Start: 2019-06-05 | End: 2019-06-07

## 2019-06-05 RX ORDER — OXYCODONE HYDROCHLORIDE 5 MG/1
10 TABLET ORAL EVERY 4 HOURS
Refills: 0 | Status: DISCONTINUED | OUTPATIENT
Start: 2019-06-05 | End: 2019-06-07

## 2019-06-05 RX ORDER — MORPHINE SULFATE 50 MG/1
4 CAPSULE, EXTENDED RELEASE ORAL EVERY 4 HOURS
Refills: 0 | Status: DISCONTINUED | OUTPATIENT
Start: 2019-06-05 | End: 2019-06-07

## 2019-06-05 RX ORDER — CEFAZOLIN SODIUM 1 G
2000 VIAL (EA) INJECTION EVERY 8 HOURS
Refills: 0 | Status: COMPLETED | OUTPATIENT
Start: 2019-06-05 | End: 2019-06-06

## 2019-06-05 RX ORDER — FUROSEMIDE 40 MG
20 TABLET ORAL DAILY
Refills: 0 | Status: DISCONTINUED | OUTPATIENT
Start: 2019-06-05 | End: 2019-06-07

## 2019-06-05 RX ORDER — SODIUM CHLORIDE 9 MG/ML
1000 INJECTION, SOLUTION INTRAVENOUS
Refills: 0 | Status: DISCONTINUED | OUTPATIENT
Start: 2019-06-05 | End: 2019-06-07

## 2019-06-05 RX ORDER — CEFAZOLIN SODIUM 1 G
2000 VIAL (EA) INJECTION EVERY 8 HOURS
Refills: 0 | Status: DISCONTINUED | OUTPATIENT
Start: 2019-06-05 | End: 2019-06-05

## 2019-06-05 RX ORDER — SIMVASTATIN 20 MG/1
10 TABLET, FILM COATED ORAL AT BEDTIME
Refills: 0 | Status: DISCONTINUED | OUTPATIENT
Start: 2019-06-05 | End: 2019-06-07

## 2019-06-05 RX ORDER — ASPIRIN/CALCIUM CARB/MAGNESIUM 324 MG
325 TABLET ORAL DAILY
Refills: 0 | Status: DISCONTINUED | OUTPATIENT
Start: 2019-06-05 | End: 2019-06-07

## 2019-06-05 RX ORDER — LOSARTAN POTASSIUM 100 MG/1
50 TABLET, FILM COATED ORAL DAILY
Refills: 0 | Status: DISCONTINUED | OUTPATIENT
Start: 2019-06-05 | End: 2019-06-07

## 2019-06-05 RX ORDER — METOCLOPRAMIDE HCL 10 MG
10 TABLET ORAL EVERY 6 HOURS
Refills: 0 | Status: DISCONTINUED | OUTPATIENT
Start: 2019-06-05 | End: 2019-06-07

## 2019-06-05 RX ORDER — PANTOPRAZOLE SODIUM 20 MG/1
40 TABLET, DELAYED RELEASE ORAL
Refills: 0 | Status: DISCONTINUED | OUTPATIENT
Start: 2019-06-05 | End: 2019-06-07

## 2019-06-05 RX ORDER — OXYCODONE HYDROCHLORIDE 5 MG/1
5 TABLET ORAL EVERY 4 HOURS
Refills: 0 | Status: DISCONTINUED | OUTPATIENT
Start: 2019-06-05 | End: 2019-06-07

## 2019-06-05 RX ADMIN — SIMVASTATIN 10 MILLIGRAM(S): 20 TABLET, FILM COATED ORAL at 22:42

## 2019-06-05 RX ADMIN — Medication 325 MILLIGRAM(S): at 23:16

## 2019-06-05 RX ADMIN — ATENOLOL 25 MILLIGRAM(S): 25 TABLET ORAL at 22:46

## 2019-06-05 RX ADMIN — Medication 2000 MILLIGRAM(S): at 22:42

## 2019-06-05 RX ADMIN — GABAPENTIN 300 MILLIGRAM(S): 400 CAPSULE ORAL at 22:42

## 2019-06-05 RX ADMIN — Medication 100 MILLIGRAM(S): at 22:42

## 2019-06-05 NOTE — PROGRESS NOTE ADULT - SUBJECTIVE AND OBJECTIVE BOX
Orthopaedics Post Op Check    Procedure: L RTSA  Surgeon: Geetha    Pt comfortable, without complaints  Denies CP, SOB, N/V, numbness/tingling     Vital Signs Last 24 Hrs  T(C): 36.1 (05 Jun 2019 15:48), Max: 36.8 (05 Jun 2019 09:34)  T(F): 97 (05 Jun 2019 15:48), Max: 98.2 (05 Jun 2019 09:34)  HR: 80 (05 Jun 2019 17:48) (56 - 80)  BP: 157/72 (05 Jun 2019 17:18) (120/66 - 157/72)  BP(mean): 102 (05 Jun 2019 17:18) (88 - 102)  RR: 30 (05 Jun 2019 17:48) (18 - 30)  SpO2: 97% (05 Jun 2019 17:48) (96% - 98%)      AVSS, NAD  General: Pt Alert and oriented         LUE  Dressing: C/D/I - aquacell - sling  Motor: 5/5 wrist flexion, extension/, palmar intrinsics  Sensation: SILT med/uln/rad/musc/axillary   Pulses:  rad/brach 2+ , fingers/hand WWP      A/P: 74yFemale POD#0 s/p above procedure, doing well  - Stable  - Pain Control  - DVT ppx:  BID / SCDs  - Doris op abx: ancef  - PT, WBS: NWB LUE  - F/U AM Labs

## 2019-06-06 ENCOUNTER — TRANSCRIPTION ENCOUNTER (OUTPATIENT)
Age: 74
End: 2019-06-06

## 2019-06-06 LAB
ANION GAP SERPL CALC-SCNC: 9 MMOL/L — SIGNIFICANT CHANGE UP (ref 5–17)
BASOPHILS # BLD AUTO: 0.04 K/UL — SIGNIFICANT CHANGE UP (ref 0–0.2)
BASOPHILS NFR BLD AUTO: 0.5 % — SIGNIFICANT CHANGE UP (ref 0–2)
BUN SERPL-MCNC: 13 MG/DL — SIGNIFICANT CHANGE UP (ref 7–23)
CALCIUM SERPL-MCNC: 9.1 MG/DL — SIGNIFICANT CHANGE UP (ref 8.4–10.5)
CHLORIDE SERPL-SCNC: 103 MMOL/L — SIGNIFICANT CHANGE UP (ref 96–108)
CO2 SERPL-SCNC: 26 MMOL/L — SIGNIFICANT CHANGE UP (ref 22–31)
CREAT SERPL-MCNC: 0.79 MG/DL — SIGNIFICANT CHANGE UP (ref 0.5–1.3)
EOSINOPHIL # BLD AUTO: 0 K/UL — SIGNIFICANT CHANGE UP (ref 0–0.5)
EOSINOPHIL NFR BLD AUTO: 0 % — SIGNIFICANT CHANGE UP (ref 0–6)
GLUCOSE SERPL-MCNC: 117 MG/DL — HIGH (ref 70–99)
HCT VFR BLD CALC: 30.1 % — LOW (ref 34.5–45)
HGB BLD-MCNC: 9.4 G/DL — LOW (ref 11.5–15.5)
IMM GRANULOCYTES NFR BLD AUTO: 0.5 % — SIGNIFICANT CHANGE UP (ref 0–1.5)
LYMPHOCYTES # BLD AUTO: 0.89 K/UL — LOW (ref 1–3.3)
LYMPHOCYTES # BLD AUTO: 10.9 % — LOW (ref 13–44)
MCHC RBC-ENTMCNC: 29.4 PG — SIGNIFICANT CHANGE UP (ref 27–34)
MCHC RBC-ENTMCNC: 31.2 GM/DL — LOW (ref 32–36)
MCV RBC AUTO: 94.1 FL — SIGNIFICANT CHANGE UP (ref 80–100)
MONOCYTES # BLD AUTO: 0.64 K/UL — SIGNIFICANT CHANGE UP (ref 0–0.9)
MONOCYTES NFR BLD AUTO: 7.9 % — SIGNIFICANT CHANGE UP (ref 2–14)
NEUTROPHILS # BLD AUTO: 6.52 K/UL — SIGNIFICANT CHANGE UP (ref 1.8–7.4)
NEUTROPHILS NFR BLD AUTO: 80.2 % — HIGH (ref 43–77)
NRBC # BLD: 0 /100 WBCS — SIGNIFICANT CHANGE UP (ref 0–0)
PLATELET # BLD AUTO: 239 K/UL — SIGNIFICANT CHANGE UP (ref 150–400)
POTASSIUM SERPL-MCNC: 4.5 MMOL/L — SIGNIFICANT CHANGE UP (ref 3.5–5.3)
POTASSIUM SERPL-SCNC: 4.5 MMOL/L — SIGNIFICANT CHANGE UP (ref 3.5–5.3)
RBC # BLD: 3.2 M/UL — LOW (ref 3.8–5.2)
RBC # FLD: 13.3 % — SIGNIFICANT CHANGE UP (ref 10.3–14.5)
SODIUM SERPL-SCNC: 138 MMOL/L — SIGNIFICANT CHANGE UP (ref 135–145)
WBC # BLD: 8.13 K/UL — SIGNIFICANT CHANGE UP (ref 3.8–10.5)
WBC # FLD AUTO: 8.13 K/UL — SIGNIFICANT CHANGE UP (ref 3.8–10.5)

## 2019-06-06 RX ORDER — KETOROLAC TROMETHAMINE 30 MG/ML
15 SYRINGE (ML) INJECTION EVERY 6 HOURS
Refills: 0 | Status: DISCONTINUED | OUTPATIENT
Start: 2019-06-06 | End: 2019-06-07

## 2019-06-06 RX ORDER — SODIUM CHLORIDE 9 MG/ML
250 INJECTION INTRAMUSCULAR; INTRAVENOUS; SUBCUTANEOUS ONCE
Refills: 0 | Status: COMPLETED | OUTPATIENT
Start: 2019-06-06 | End: 2019-06-06

## 2019-06-06 RX ORDER — ERGOCALCIFEROL 1.25 MG/1
0 CAPSULE ORAL
Qty: 0 | Refills: 0 | DISCHARGE

## 2019-06-06 RX ORDER — ACETAMINOPHEN 500 MG
975 TABLET ORAL EVERY 8 HOURS
Refills: 0 | Status: DISCONTINUED | OUTPATIENT
Start: 2019-06-06 | End: 2019-06-07

## 2019-06-06 RX ADMIN — Medication 100 MILLIGRAM(S): at 13:12

## 2019-06-06 RX ADMIN — Medication 100 MILLIGRAM(S): at 06:15

## 2019-06-06 RX ADMIN — PANTOPRAZOLE SODIUM 40 MILLIGRAM(S): 20 TABLET, DELAYED RELEASE ORAL at 06:15

## 2019-06-06 RX ADMIN — Medication 100 MILLIGRAM(S): at 22:33

## 2019-06-06 RX ADMIN — Medication 325 MILLIGRAM(S): at 11:23

## 2019-06-06 RX ADMIN — OXYCODONE HYDROCHLORIDE 10 MILLIGRAM(S): 5 TABLET ORAL at 23:46

## 2019-06-06 RX ADMIN — Medication 15 MILLIGRAM(S): at 11:23

## 2019-06-06 RX ADMIN — Medication 15 MILLIGRAM(S): at 11:38

## 2019-06-06 RX ADMIN — GABAPENTIN 300 MILLIGRAM(S): 400 CAPSULE ORAL at 22:33

## 2019-06-06 RX ADMIN — ERGOCALCIFEROL 1000 UNIT(S): 1.25 CAPSULE ORAL at 13:13

## 2019-06-06 RX ADMIN — Medication 975 MILLIGRAM(S): at 23:03

## 2019-06-06 RX ADMIN — SIMVASTATIN 10 MILLIGRAM(S): 20 TABLET, FILM COATED ORAL at 22:34

## 2019-06-06 RX ADMIN — GABAPENTIN 300 MILLIGRAM(S): 400 CAPSULE ORAL at 13:12

## 2019-06-06 RX ADMIN — SERTRALINE 50 MILLIGRAM(S): 25 TABLET, FILM COATED ORAL at 11:23

## 2019-06-06 RX ADMIN — Medication 975 MILLIGRAM(S): at 13:12

## 2019-06-06 RX ADMIN — GABAPENTIN 300 MILLIGRAM(S): 400 CAPSULE ORAL at 06:15

## 2019-06-06 RX ADMIN — OXYCODONE HYDROCHLORIDE 10 MILLIGRAM(S): 5 TABLET ORAL at 23:16

## 2019-06-06 RX ADMIN — LOSARTAN POTASSIUM 50 MILLIGRAM(S): 100 TABLET, FILM COATED ORAL at 06:15

## 2019-06-06 RX ADMIN — Medication 2000 MILLIGRAM(S): at 06:16

## 2019-06-06 RX ADMIN — Medication 975 MILLIGRAM(S): at 14:12

## 2019-06-06 RX ADMIN — Medication 975 MILLIGRAM(S): at 22:33

## 2019-06-06 RX ADMIN — SODIUM CHLORIDE 500 MILLILITER(S): 9 INJECTION INTRAMUSCULAR; INTRAVENOUS; SUBCUTANEOUS at 13:11

## 2019-06-06 RX ADMIN — ATENOLOL 25 MILLIGRAM(S): 25 TABLET ORAL at 22:34

## 2019-06-06 NOTE — DISCHARGE NOTE PROVIDER - HOSPITAL COURSE
Admit    OR- left reverse total shoulder arthroplasty    Periop Antibx    DVT ppx    Occupational therapy- activities of daily living evaluation    Pain mgt Admit    OR- left reverse total shoulder arthroplasty    Periop Antibx    DVT ppx    physical therapy- mobility evaluation with cane    Occupational therapy- activities of daily living evaluation    Pain mgt    medicine consult

## 2019-06-06 NOTE — OCCUPATIONAL THERAPY INITIAL EVALUATION ADULT - PLANNED THERAPY INTERVENTIONS, OT EVAL
strengthening/balance training/neuromuscular re-education/transfer training/ADL retraining/bed mobility training/motor coordination training/fine motor coordination training

## 2019-06-06 NOTE — OCCUPATIONAL THERAPY INITIAL EVALUATION ADULT - GROSSLY INTACT, SENSORY
Right UE/Grossly Intact/L hand sensation intact, patient reports decreased sensation from shoulder to wrist

## 2019-06-06 NOTE — PROGRESS NOTE ADULT - SUBJECTIVE AND OBJECTIVE BOX
Orthopaedics Progress Note    Pt comfortable, without complaints  Denies CP, SOB, N/V, numbness/tingling     Vital Signs Last 24 Hrs  T(C): 35.9 (06 Jun 2019 04:20), Max: 36.8 (05 Jun 2019 09:34)  T(F): 96.6 (06 Jun 2019 04:20), Max: 98.2 (05 Jun 2019 09:34)  HR: 72 (06 Jun 2019 04:20) (56 - 88)  BP: 122/57 (06 Jun 2019 04:20) (105/69 - 157/72)  BP(mean): 102 (05 Jun 2019 17:18) (88 - 102)  RR: 16 (06 Jun 2019 04:20) (16 - 30)  SpO2: 96% (06 Jun 2019 04:20) (96% - 98%)      AVSS, NAD  General: Pt Alert and oriented         LUE  Dressing: C/D/I - aquacell - sling  Motor: 5/5 wrist flexion, extension/, palmar intrinsics  Sensation: SILT med/uln/rad/musc/axillary   Pulses:  rad/brach 2+ , fingers/hand WWP      A/P: 74yFemale POD#1 s/p above procedure, doing well  - Stable  - Pain Control  - DVT ppx:  BID / SCDs  - Doris op abx: ancef  - PT, WBS: NWB LUE  - F/U AM Labs

## 2019-06-06 NOTE — OCCUPATIONAL THERAPY INITIAL EVALUATION ADULT - GENERAL OBSERVATIONS, REHAB EVAL
R hand dominant, Romanian speaking, patient cleared for OT by ADDIS Sparks. Patient received semi-supine, c/o pain 3/10, +heplock, +L shoulder sling, +SCDs.

## 2019-06-06 NOTE — DISCHARGE NOTE PROVIDER - NSDCCPTREATMENT_GEN_ALL_CORE_FT
PRINCIPAL PROCEDURE  Procedure: Arthroplasty, shoulder, total, reverse, open  Findings and Treatment:

## 2019-06-06 NOTE — PROGRESS NOTE ADULT - SUBJECTIVE AND OBJECTIVE BOX
Patient seen. Sitting upright eating dinner. No acute distress. LUE in sling. Episode of lightheadedness while standing today. Feels better now. No other complaints.     Awake, alert, comfortable  Speaking in full sentences, No SOB  LUE:  dressing c/d/i  able to fire deltoid,   subjectively decreased sens ax distribution  AIN/PIN/M/R/U intact  M/R/U sens SILT  BCR

## 2019-06-06 NOTE — OCCUPATIONAL THERAPY INITIAL EVALUATION ADULT - ADDITIONAL COMMENTS
Patient reports having shower chair and bedside commode at home. Patient w/ [facility] 4 days a week, 4 hrs per day, and reports son helps her on weekends. Patient ambulates with quadcane or RW. Patient reports current [facility] hours does not meet current needs, and patient needs increased assistance at home.

## 2019-06-06 NOTE — OCCUPATIONAL THERAPY INITIAL EVALUATION ADULT - ANTICIPATED DISCHARGE DISPOSITION, OT EVAL
Home w/ family support and HHA pending progress Home w/ Home OT and w/ family support and HHA pending progress

## 2019-06-06 NOTE — OCCUPATIONAL THERAPY INITIAL EVALUATION ADULT - MD ORDER
Per chart, 73 year old female presents with left shoulder pain x 3 years without preceding accident, injury or trauma. The patient complains of left shoulder pain which radiates down the left arm and is occasionally associated with numbness/tingling. Patient s/p Left RTSA 6/5/19.

## 2019-06-06 NOTE — DISCHARGE NOTE PROVIDER - NSDCFUADDINST_GEN_ALL_CORE_FT
Non-weight bearing on left arm  Keep sling on left arm except for dressing and showering  You can perform elbow flexion  No strenuous activity, heavy lifting, driving, tub bathing, or returning to work until cleared by MD.  You may shower--dressing is waterproof.  keep dressing on until follow up appointment  Follow up with Dr. Iniguez to schedule an appt within 10-14 days.  If you don't have a bowel movement by post op day 3, then take Milk of Magnesia (over the counter).  If no bowel movement by at least post op day 5, then use a Dulcolax suppository (over the counter) and/or a Fleets enema--if still no bowel movement, call your MD.  Contact your doctor if you experience: fever greater than 101.5, chills, chest pain, difficulty breathing, bleeding, redness or heat around the incision. Non-weight bearing on left arm  Keep sling on left arm except for dressing and showering  You can perform elbow flexion  Wear left shoulder ice cryocuff with T-shirt on the skin between the ice machine, as needed for comfort  No strenuous activity, heavy lifting, driving, tub bathing, or returning to work until cleared by MD.  You may shower--dressing is waterproof.  keep dressing on until follow up appointment  Follow up with Dr. Iniguez to schedule an appt within 10-14 days.  If you don't have a bowel movement by post op day 3, then take Milk of Magnesia (over the counter).  If no bowel movement by at least post op day 5, then use a Dulcolax suppository (over the counter) and/or a Fleets enema--if still no bowel movement, call your MD.  Contact your doctor if you experience: fever greater than 101.5, chills, chest pain, difficulty breathing, bleeding, redness or heat around the incision.

## 2019-06-06 NOTE — DISCHARGE NOTE PROVIDER - NSDCCPCAREPLAN_GEN_ALL_CORE_FT
PRINCIPAL DISCHARGE DIAGNOSIS  Diagnosis: Left shoulder pain  Assessment and Plan of Treatment: Left shoulder pain

## 2019-06-06 NOTE — DISCHARGE NOTE PROVIDER - CARE PROVIDER_API CALL
Shakir Iniguez (MD)  [facility]th[facility]  [address], [address] [place] [address]  Phone: [phone]  Fax: [phone]  Follow Up Time:

## 2019-06-06 NOTE — PROGRESS NOTE ADULT - ASSESSMENT
74F POD#1 s/p Left rTSA doing well    Plan  Diet  IS  OT  Pain control  ASA for dvt ppx  dc planning

## 2019-06-07 ENCOUNTER — TRANSCRIPTION ENCOUNTER (OUTPATIENT)
Age: 74
End: 2019-06-07

## 2019-06-07 VITALS
SYSTOLIC BLOOD PRESSURE: 133 MMHG | HEART RATE: 75 BPM | RESPIRATION RATE: 17 BRPM | TEMPERATURE: 99 F | OXYGEN SATURATION: 100 % | DIASTOLIC BLOOD PRESSURE: 81 MMHG

## 2019-06-07 LAB
ANION GAP SERPL CALC-SCNC: 8 MMOL/L — SIGNIFICANT CHANGE UP (ref 5–17)
BUN SERPL-MCNC: 15 MG/DL — SIGNIFICANT CHANGE UP (ref 7–23)
CALCIUM SERPL-MCNC: 8.8 MG/DL — SIGNIFICANT CHANGE UP (ref 8.4–10.5)
CHLORIDE SERPL-SCNC: 108 MMOL/L — SIGNIFICANT CHANGE UP (ref 96–108)
CO2 SERPL-SCNC: 26 MMOL/L — SIGNIFICANT CHANGE UP (ref 22–31)
CREAT SERPL-MCNC: 0.94 MG/DL — SIGNIFICANT CHANGE UP (ref 0.5–1.3)
GLUCOSE SERPL-MCNC: 106 MG/DL — HIGH (ref 70–99)
HCT VFR BLD CALC: 28.9 % — LOW (ref 34.5–45)
HGB BLD-MCNC: 8.9 G/DL — LOW (ref 11.5–15.5)
MCHC RBC-ENTMCNC: 29.8 PG — SIGNIFICANT CHANGE UP (ref 27–34)
MCHC RBC-ENTMCNC: 30.8 GM/DL — LOW (ref 32–36)
MCV RBC AUTO: 96.7 FL — SIGNIFICANT CHANGE UP (ref 80–100)
NRBC # BLD: 0 /100 WBCS — SIGNIFICANT CHANGE UP (ref 0–0)
PLATELET # BLD AUTO: 240 K/UL — SIGNIFICANT CHANGE UP (ref 150–400)
POTASSIUM SERPL-MCNC: 4 MMOL/L — SIGNIFICANT CHANGE UP (ref 3.5–5.3)
POTASSIUM SERPL-SCNC: 4 MMOL/L — SIGNIFICANT CHANGE UP (ref 3.5–5.3)
RBC # BLD: 2.99 M/UL — LOW (ref 3.8–5.2)
RBC # FLD: 13.7 % — SIGNIFICANT CHANGE UP (ref 10.3–14.5)
SODIUM SERPL-SCNC: 142 MMOL/L — SIGNIFICANT CHANGE UP (ref 135–145)
WBC # BLD: 7.22 K/UL — SIGNIFICANT CHANGE UP (ref 3.8–10.5)
WBC # FLD AUTO: 7.22 K/UL — SIGNIFICANT CHANGE UP (ref 3.8–10.5)

## 2019-06-07 PROCEDURE — 99223 1ST HOSP IP/OBS HIGH 75: CPT

## 2019-06-07 PROCEDURE — 71045 X-RAY EXAM CHEST 1 VIEW: CPT | Mod: 26

## 2019-06-07 RX ORDER — OXYCODONE HYDROCHLORIDE 5 MG/1
1 TABLET ORAL
Qty: 14 | Refills: 0
Start: 2019-06-07 | End: 2019-06-13

## 2019-06-07 RX ORDER — CELECOXIB 200 MG/1
1 CAPSULE ORAL
Qty: 60 | Refills: 0
Start: 2019-06-07 | End: 2019-07-06

## 2019-06-07 RX ORDER — CELECOXIB 200 MG/1
200 CAPSULE ORAL
Refills: 0 | Status: DISCONTINUED | OUTPATIENT
Start: 2019-06-07 | End: 2019-06-07

## 2019-06-07 RX ORDER — ALPRAZOLAM 0.25 MG
2 TABLET ORAL THREE TIMES A DAY
Refills: 0 | Status: DISCONTINUED | OUTPATIENT
Start: 2019-06-07 | End: 2019-06-07

## 2019-06-07 RX ORDER — ACETAMINOPHEN 500 MG
3 TABLET ORAL
Qty: 0 | Refills: 0 | DISCHARGE
Start: 2019-06-07

## 2019-06-07 RX ADMIN — GABAPENTIN 300 MILLIGRAM(S): 400 CAPSULE ORAL at 05:08

## 2019-06-07 RX ADMIN — Medication 100 MILLIGRAM(S): at 05:09

## 2019-06-07 RX ADMIN — ERGOCALCIFEROL 1000 UNIT(S): 1.25 CAPSULE ORAL at 13:32

## 2019-06-07 RX ADMIN — Medication 20 MILLIGRAM(S): at 05:07

## 2019-06-07 RX ADMIN — GABAPENTIN 300 MILLIGRAM(S): 400 CAPSULE ORAL at 13:32

## 2019-06-07 RX ADMIN — Medication 325 MILLIGRAM(S): at 13:32

## 2019-06-07 RX ADMIN — CELECOXIB 200 MILLIGRAM(S): 200 CAPSULE ORAL at 18:30

## 2019-06-07 RX ADMIN — SERTRALINE 50 MILLIGRAM(S): 25 TABLET, FILM COATED ORAL at 13:33

## 2019-06-07 RX ADMIN — Medication 975 MILLIGRAM(S): at 05:38

## 2019-06-07 RX ADMIN — Medication 975 MILLIGRAM(S): at 13:32

## 2019-06-07 RX ADMIN — Medication 100 MILLIGRAM(S): at 13:31

## 2019-06-07 RX ADMIN — OXYCODONE HYDROCHLORIDE 5 MILLIGRAM(S): 5 TABLET ORAL at 02:57

## 2019-06-07 RX ADMIN — Medication 975 MILLIGRAM(S): at 14:32

## 2019-06-07 RX ADMIN — OXYCODONE HYDROCHLORIDE 5 MILLIGRAM(S): 5 TABLET ORAL at 01:57

## 2019-06-07 RX ADMIN — LOSARTAN POTASSIUM 50 MILLIGRAM(S): 100 TABLET, FILM COATED ORAL at 05:09

## 2019-06-07 RX ADMIN — PANTOPRAZOLE SODIUM 40 MILLIGRAM(S): 20 TABLET, DELAYED RELEASE ORAL at 05:10

## 2019-06-07 RX ADMIN — CELECOXIB 200 MILLIGRAM(S): 200 CAPSULE ORAL at 17:52

## 2019-06-07 RX ADMIN — Medication 975 MILLIGRAM(S): at 05:08

## 2019-06-07 NOTE — DISCHARGE NOTE NURSING/CASE MANAGEMENT/SOCIAL WORK - NSDCDPATPORTLINK_GEN_ALL_CORE
You can access the [facility] Patient Portal, offered by [facility], by registering with the following website: [email or web address]

## 2019-06-07 NOTE — PROGRESS NOTE ADULT - SUBJECTIVE AND OBJECTIVE BOX
Orthopaedics Progress Note    Pt comfortable, without complaints  Denies CP, SOB, N/V, numbness/tingling     Vital Signs Last 24 Hrs  T(C): 36.3 (07 Jun 2019 04:30), Max: 37.1 (06 Jun 2019 08:50)  T(F): 97.4 (07 Jun 2019 04:30), Max: 98.7 (06 Jun 2019 08:50)  HR: 75 (07 Jun 2019 04:30) (74 - 83)  BP: 131/61 (07 Jun 2019 04:30) (93/59 - 141/66)  BP(mean): --  RR: 16 (07 Jun 2019 04:30) (15 - 20)  SpO2: 95% (07 Jun 2019 04:30) (94% - 96%)      AVSS, NAD  General: Pt Alert and oriented         LUE  Dressing: C/D/I - aquacell - sling  Motor: 5/5 wrist flexion, extension/, palmar intrinsics  Sensation: SILT med/uln/rad/musc/axillary   Pulses:  rad/brach 2+ , fingers/hand WWP      A/P: 74yFemale POD#2 s/p above procedure, doing well  - Stable  - Pain Control  - DVT ppx:  BID / SCDs  - Doris op abx: ancef  - PT, WBS: NWB LUE  - F/U AM Labs

## 2019-06-07 NOTE — CONSULT NOTE ADULT - SUBJECTIVE AND OBJECTIVE BOX
CC: No overnight events, no complaints.   Feeling well, pain is overall controlled  Tolerates PO diet (+); Urination (+); Walked with PT (+)  Denies cp, sob, dizziness, HA, abdominal pain, n/v.  Rest of ROS negative.     Vital Signs Last 24 Hrs  T(C): 37.1 (07 Jun 2019 15:54), Max: 37.1 (07 Jun 2019 15:54)  T(F): 98.7 (07 Jun 2019 15:54), Max: 98.7 (07 Jun 2019 15:54)  HR: 75 (07 Jun 2019 15:54) (65 - 75)  BP: 133/81 (07 Jun 2019 15:54) (96/61 - 135/70)  BP(mean): --  RR: 17 (07 Jun 2019 15:54) (16 - 17)  SpO2: 100% (07 Jun 2019 15:54) (94% - 100%)    PHYSICAL EXAMINATION  * General: Not in acute distress. Awake and alert. Lying comfortably in bed.  * Head: Normocephalic, atraumatic.  * HEENT: ears no discharge, eyes PERRLA, nose no discharge, throat no exudates, normal tonsils.  * Neck: no JVD, supple.  * Lungs: Clear to auscultation, no rales, no wheezes.  * Cardio: Regular rate and rhythm, no murmurs, no rubs, no gallops. Good peripheral pulses.  * Abdomen: Soft, non-tender, non-distended, tympanic to percussion, no rebound, no guarding, no rigidity. Bowel sounds present. No suprapubic or CVA tenderness.  * : Deferred.  * Extremities: Acyanotic, no edema.  * Skin: Warm and dry.  * Neuro: Alert and oriented x 3. No focal deficits. Motor strength is 5/5 throughout. Sensation intact. Cranial nerves II-XII grossly intact.                           8.9    7.22  )-----------( 240      ( 07 Jun 2019 07:25 )             28.9   06-07    142  |  108  |  15  ----------------------------<  106<H>  4.0   |  26  |  0.94    Ca    8.8      07 Jun 2019 07:25    MEDICATIONS  (STANDING):  acetaminophen   Tablet .. 975 milliGRAM(s) Oral every 8 hours  aspirin enteric coated 325 milliGRAM(s) Oral daily  ATENolol  Tablet 25 milliGRAM(s) Oral at bedtime  BUpivacaine liposome 1.3% Injectable (no eMAR) 20 milliLiter(s) Local Injection once  celecoxib 200 milliGRAM(s) Oral two times a day  docusate sodium 100 milliGRAM(s) Oral three times a day  ergocalciferol Drops 1000 Unit(s) Oral daily  furosemide    Tablet 20 milliGRAM(s) Oral daily  gabapentin 300 milliGRAM(s) Oral three times a day  losartan 50 milliGRAM(s) Oral daily  pantoprazole    Tablet 40 milliGRAM(s) Oral before breakfast  sertraline 50 milliGRAM(s) Oral daily  simvastatin 10 milliGRAM(s) Oral at bedtime    MEDICATIONS  (PRN):  metoclopramide Injectable 10 milliGRAM(s) IV Push every 6 hours PRN Nausea and/or Vomiting  oxyCODONE    IR 5 milliGRAM(s) Oral every 4 hours PRN Moderate Pain (4 - 6)  senna 2 Tablet(s) Oral at bedtime PRN Constipation

## 2019-06-07 NOTE — PHYSICAL THERAPY INITIAL EVALUATION ADULT - ADDITIONAL COMMENTS
Pt ambulates with a cane at baseline, pt is right handed. Pt lives with her 2 sons whom take care of her and has an aide 4hrs/day 4 days per  week. Croatian speaking primarily. Sleeps in hospital bed

## 2019-06-07 NOTE — CONSULT NOTE ADULT - ASSESSMENT
74yo F, PMH of HTN. c/o chronic left shoulder pain x 3 years associated with numbness/tingling. The patient failed conservative therapies. Admitted  for elective left reverse total shoulder arthroplasty, POD-2. Medicine consulted for hypoxia and sob.    1) Hypoxia and sob, now resolved. Current O2Sat is 97% on room air.   Feeling well, speaking in full sentences.   No accessory muscle use.   CXR with left sided atelectasis.   * Encourage incentive spirometry.  * OOB-C    2) HTN  * c/w home atenolol 25mg po daily.   * c/w Lasix 20mg po daily.   * c/w Losartan 50mg po daily.     3) Post-op state  * OOB-C  * Physical therapy evaluation  * Aggressive incentive spirometry  * Pain control and bowel regimen  * Mechanical LE ppx     3) VTE ppx.   * c/w ASA

## 2019-06-12 DIAGNOSIS — E66.9 OBESITY, UNSPECIFIED: ICD-10-CM

## 2019-06-12 DIAGNOSIS — Z96.651 PRESENCE OF RIGHT ARTIFICIAL KNEE JOINT: ICD-10-CM

## 2019-06-12 DIAGNOSIS — R06.02 SHORTNESS OF BREATH: ICD-10-CM

## 2019-06-12 DIAGNOSIS — R09.02 HYPOXEMIA: ICD-10-CM

## 2019-06-12 DIAGNOSIS — Z79.82 LONG TERM (CURRENT) USE OF ASPIRIN: ICD-10-CM

## 2019-06-12 DIAGNOSIS — Z87.891 PERSONAL HISTORY OF NICOTINE DEPENDENCE: ICD-10-CM

## 2019-06-12 DIAGNOSIS — K21.9 GASTRO-ESOPHAGEAL REFLUX DISEASE WITHOUT ESOPHAGITIS: ICD-10-CM

## 2019-06-12 DIAGNOSIS — I10 ESSENTIAL (PRIMARY) HYPERTENSION: ICD-10-CM

## 2019-06-12 DIAGNOSIS — M19.012 PRIMARY OSTEOARTHRITIS, LEFT SHOULDER: ICD-10-CM

## 2019-06-12 DIAGNOSIS — G62.9 POLYNEUROPATHY, UNSPECIFIED: ICD-10-CM

## 2019-06-12 DIAGNOSIS — Z96.641 PRESENCE OF RIGHT ARTIFICIAL HIP JOINT: ICD-10-CM

## 2019-06-12 DIAGNOSIS — G89.29 OTHER CHRONIC PAIN: ICD-10-CM

## 2019-06-12 DIAGNOSIS — E78.5 HYPERLIPIDEMIA, UNSPECIFIED: ICD-10-CM

## 2019-06-16 ENCOUNTER — FORM ENCOUNTER (OUTPATIENT)
Age: 74
End: 2019-06-16

## 2019-06-17 ENCOUNTER — OUTPATIENT (OUTPATIENT)
Dept: OUTPATIENT SERVICES | Facility: HOSPITAL | Age: 74
LOS: 1 days | End: 2019-06-17
Payer: MEDICARE

## 2019-06-17 ENCOUNTER — APPOINTMENT (OUTPATIENT)
Dept: ORTHOPEDIC SURGERY | Facility: CLINIC | Age: 74
End: 2019-06-17
Payer: MEDICARE

## 2019-06-17 ENCOUNTER — APPOINTMENT (OUTPATIENT)
Dept: RADIOLOGY | Facility: CLINIC | Age: 74
End: 2019-06-17

## 2019-06-17 DIAGNOSIS — Z96.651 PRESENCE OF RIGHT ARTIFICIAL KNEE JOINT: Chronic | ICD-10-CM

## 2019-06-17 DIAGNOSIS — Z96.641 PRESENCE OF RIGHT ARTIFICIAL HIP JOINT: Chronic | ICD-10-CM

## 2019-06-17 PROCEDURE — 99024 POSTOP FOLLOW-UP VISIT: CPT

## 2019-06-17 PROCEDURE — 73030 X-RAY EXAM OF SHOULDER: CPT | Mod: 26,LT

## 2019-06-17 RX ORDER — MELOXICAM 15 MG/1
15 TABLET ORAL
Qty: 30 | Refills: 1 | Status: DISCONTINUED | COMMUNITY
Start: 2017-11-30 | End: 2019-06-17

## 2019-07-18 ENCOUNTER — APPOINTMENT (OUTPATIENT)
Dept: ORTHOPEDIC SURGERY | Facility: CLINIC | Age: 74
End: 2019-07-18
Payer: MEDICARE

## 2019-07-18 PROCEDURE — 99024 POSTOP FOLLOW-UP VISIT: CPT

## 2019-08-09 ENCOUNTER — MEDICATION RENEWAL (OUTPATIENT)
Age: 74
End: 2019-08-09

## 2019-08-12 ENCOUNTER — RX RENEWAL (OUTPATIENT)
Age: 74
End: 2019-08-12

## 2019-08-12 ENCOUNTER — TRANSCRIPTION ENCOUNTER (OUTPATIENT)
Age: 74
End: 2019-08-12

## 2019-08-26 ENCOUNTER — APPOINTMENT (OUTPATIENT)
Dept: ORTHOPEDIC SURGERY | Facility: CLINIC | Age: 74
End: 2019-08-26
Payer: MEDICARE

## 2019-08-26 VITALS — HEIGHT: 65 IN | WEIGHT: 180 LBS | BODY MASS INDEX: 29.99 KG/M2

## 2019-08-26 PROCEDURE — 99024 POSTOP FOLLOW-UP VISIT: CPT

## 2019-08-26 RX ORDER — OXYCODONE AND ACETAMINOPHEN 5; 325 MG/1; MG/1
5-325 TABLET ORAL
Qty: 20 | Refills: 0 | Status: DISCONTINUED | COMMUNITY
Start: 2019-06-04 | End: 2019-08-26

## 2019-08-26 RX ORDER — OXYCODONE AND ACETAMINOPHEN 5; 325 MG/1; MG/1
5-325 TABLET ORAL
Qty: 30 | Refills: 0 | Status: DISCONTINUED | COMMUNITY
Start: 2019-06-17 | End: 2019-08-26

## 2019-08-26 RX ORDER — TRAMADOL HYDROCHLORIDE 50 MG/1
50 TABLET, COATED ORAL 3 TIMES DAILY
Qty: 25 | Refills: 0 | Status: DISCONTINUED | COMMUNITY
Start: 2019-07-15 | End: 2019-08-26

## 2019-09-26 ENCOUNTER — TRANSCRIPTION ENCOUNTER (OUTPATIENT)
Age: 74
End: 2019-09-26

## 2019-09-26 ENCOUNTER — RX RENEWAL (OUTPATIENT)
Age: 74
End: 2019-09-26

## 2019-12-11 ENCOUNTER — RX RENEWAL (OUTPATIENT)
Age: 74
End: 2019-12-11

## 2019-12-16 ENCOUNTER — RX RENEWAL (OUTPATIENT)
Age: 74
End: 2019-12-16

## 2020-03-11 ENCOUNTER — TRANSCRIPTION ENCOUNTER (OUTPATIENT)
Age: 75
End: 2020-03-11

## 2020-03-11 ENCOUNTER — RX RENEWAL (OUTPATIENT)
Age: 75
End: 2020-03-11

## 2020-03-20 ENCOUNTER — TRANSCRIPTION ENCOUNTER (OUTPATIENT)
Age: 75
End: 2020-03-20

## 2020-03-29 ENCOUNTER — RX RENEWAL (OUTPATIENT)
Age: 75
End: 2020-03-29

## 2020-04-07 ENCOUNTER — RX RENEWAL (OUTPATIENT)
Age: 75
End: 2020-04-07

## 2020-04-14 ENCOUNTER — RX RENEWAL (OUTPATIENT)
Age: 75
End: 2020-04-14

## 2020-04-14 ENCOUNTER — TRANSCRIPTION ENCOUNTER (OUTPATIENT)
Age: 75
End: 2020-04-14

## 2020-05-07 ENCOUNTER — RX RENEWAL (OUTPATIENT)
Age: 75
End: 2020-05-07

## 2020-05-18 ENCOUNTER — TRANSCRIPTION ENCOUNTER (OUTPATIENT)
Age: 75
End: 2020-05-18

## 2020-05-19 ENCOUNTER — RX RENEWAL (OUTPATIENT)
Age: 75
End: 2020-05-19

## 2020-06-08 ENCOUNTER — TRANSCRIPTION ENCOUNTER (OUTPATIENT)
Age: 75
End: 2020-06-08

## 2020-06-08 ENCOUNTER — RX RENEWAL (OUTPATIENT)
Age: 75
End: 2020-06-08

## 2020-06-09 ENCOUNTER — TRANSCRIPTION ENCOUNTER (OUTPATIENT)
Age: 75
End: 2020-06-09

## 2020-07-06 ENCOUNTER — RX RENEWAL (OUTPATIENT)
Age: 75
End: 2020-07-06

## 2020-07-13 ENCOUNTER — TRANSCRIPTION ENCOUNTER (OUTPATIENT)
Age: 75
End: 2020-07-13

## 2020-08-04 ENCOUNTER — RX RENEWAL (OUTPATIENT)
Age: 75
End: 2020-08-04

## 2020-09-03 ENCOUNTER — RX RENEWAL (OUTPATIENT)
Age: 75
End: 2020-09-03

## 2020-09-24 ENCOUNTER — RX RENEWAL (OUTPATIENT)
Age: 75
End: 2020-09-24

## 2020-09-24 ENCOUNTER — TRANSCRIPTION ENCOUNTER (OUTPATIENT)
Age: 75
End: 2020-09-24

## 2020-09-30 ENCOUNTER — TRANSCRIPTION ENCOUNTER (OUTPATIENT)
Age: 75
End: 2020-09-30

## 2020-10-05 ENCOUNTER — OUTPATIENT (OUTPATIENT)
Dept: OUTPATIENT SERVICES | Facility: HOSPITAL | Age: 75
LOS: 1 days | End: 2020-10-05
Payer: MEDICARE

## 2020-10-05 ENCOUNTER — RESULT REVIEW (OUTPATIENT)
Age: 75
End: 2020-10-05

## 2020-10-05 ENCOUNTER — APPOINTMENT (OUTPATIENT)
Dept: ORTHOPEDIC SURGERY | Facility: CLINIC | Age: 75
End: 2020-10-05
Payer: MEDICARE

## 2020-10-05 DIAGNOSIS — Z96.641 PRESENCE OF RIGHT ARTIFICIAL HIP JOINT: Chronic | ICD-10-CM

## 2020-10-05 DIAGNOSIS — M25.561 PAIN IN RIGHT KNEE: ICD-10-CM

## 2020-10-05 DIAGNOSIS — Z96.651 PRESENCE OF RIGHT ARTIFICIAL KNEE JOINT: Chronic | ICD-10-CM

## 2020-10-05 PROCEDURE — 73030 X-RAY EXAM OF SHOULDER: CPT

## 2020-10-05 PROCEDURE — 73564 X-RAY EXAM KNEE 4 OR MORE: CPT | Mod: 26,RT

## 2020-10-05 PROCEDURE — 73030 X-RAY EXAM OF SHOULDER: CPT | Mod: 26,LT

## 2020-10-05 PROCEDURE — 99214 OFFICE O/P EST MOD 30 MIN: CPT

## 2020-10-05 PROCEDURE — 73564 X-RAY EXAM KNEE 4 OR MORE: CPT

## 2020-10-09 ENCOUNTER — RX RENEWAL (OUTPATIENT)
Age: 75
End: 2020-10-09

## 2020-10-09 ENCOUNTER — APPOINTMENT (OUTPATIENT)
Dept: HEART AND VASCULAR | Facility: CLINIC | Age: 75
End: 2020-10-09
Payer: MEDICARE

## 2020-10-09 VITALS
OXYGEN SATURATION: 96 % | HEART RATE: 65 BPM | SYSTOLIC BLOOD PRESSURE: 140 MMHG | HEIGHT: 65 IN | TEMPERATURE: 97.2 F | DIASTOLIC BLOOD PRESSURE: 80 MMHG

## 2020-10-09 DIAGNOSIS — I65.29 OCCLUSION AND STENOSIS OF UNSPECIFIED CAROTID ARTERY: ICD-10-CM

## 2020-10-09 DIAGNOSIS — Z23 ENCOUNTER FOR IMMUNIZATION: ICD-10-CM

## 2020-10-09 PROCEDURE — 99215 OFFICE O/P EST HI 40 MIN: CPT

## 2020-10-09 PROCEDURE — 36415 COLL VENOUS BLD VENIPUNCTURE: CPT

## 2020-10-09 PROCEDURE — 93000 ELECTROCARDIOGRAM COMPLETE: CPT

## 2020-10-09 PROCEDURE — 90662 IIV NO PRSV INCREASED AG IM: CPT

## 2020-10-09 PROCEDURE — G0008: CPT

## 2020-10-09 RX ORDER — TRAMADOL HYDROCHLORIDE 50 MG/1
50 TABLET, COATED ORAL
Qty: 30 | Refills: 0 | Status: DISCONTINUED | COMMUNITY
Start: 2019-09-16 | End: 2020-10-09

## 2020-10-09 RX ORDER — MELOXICAM 7.5 MG/1
7.5 TABLET ORAL
Qty: 90 | Refills: 0 | Status: DISCONTINUED | COMMUNITY
Start: 2019-04-04 | End: 2020-10-09

## 2020-10-09 RX ORDER — LOSARTAN POTASSIUM 50 MG/1
50 TABLET, FILM COATED ORAL
Qty: 90 | Refills: 3 | Status: DISCONTINUED | COMMUNITY
Start: 2019-05-16 | End: 2020-10-09

## 2020-10-09 RX ORDER — TRAMADOL HYDROCHLORIDE 50 MG/1
50 TABLET, COATED ORAL
Qty: 20 | Refills: 0 | Status: DISCONTINUED | COMMUNITY
Start: 2019-08-19 | End: 2020-10-09

## 2020-10-09 RX ORDER — ONDANSETRON 4 MG/1
4 TABLET ORAL EVERY 8 HOURS
Qty: 10 | Refills: 0 | Status: DISCONTINUED | COMMUNITY
Start: 2019-07-15 | End: 2020-10-09

## 2020-10-09 NOTE — PHYSICAL EXAM
[General Appearance - Well Developed] : well developed [Normal Appearance] : normal appearance [Well Groomed] : well groomed [General Appearance - Well Nourished] : well nourished [No Deformities] : no deformities [General Appearance - In No Acute Distress] : no acute distress [Normal Conjunctiva] : the conjunctiva exhibited no abnormalities [Eyelids - No Xanthelasma] : the eyelids demonstrated no xanthelasmas [Normal Oral Mucosa] : normal oral mucosa [No Oral Pallor] : no oral pallor [No Oral Cyanosis] : no oral cyanosis [Normal Jugular Venous A Waves Present] : normal jugular venous A waves present [Normal Jugular Venous V Waves Present] : normal jugular venous V waves present [No Jugular Venous Samaniego A Waves] : no jugular venous samaniego A waves [Respiration, Rhythm And Depth] : normal respiratory rhythm and effort [Exaggerated Use Of Accessory Muscles For Inspiration] : no accessory muscle use [Auscultation Breath Sounds / Voice Sounds] : lungs were clear to auscultation bilaterally [Heart Rate And Rhythm] : heart rate and rhythm were normal [Heart Sounds] : normal S1 and S2 [Murmurs] : no murmurs present [Abdomen Soft] : soft [Abdomen Tenderness] : non-tender [Abdomen Mass (___ Cm)] : no abdominal mass palpated [FreeTextEntry1] : frozen L shoulder, slow gait, walker [Nail Clubbing] : no clubbing of the fingernails [Cyanosis, Localized] : no localized cyanosis [Petechial Hemorrhages (___cm)] : no petechial hemorrhages [] : no ischemic changes [Oriented To Time, Place, And Person] : oriented to person, place, and time [Affect] : the affect was normal [Mood] : the mood was normal [No Anxiety] : not feeling anxious

## 2020-10-09 NOTE — REVIEW OF SYSTEMS
[Feeling Fatigued] : not feeling fatigued [Shortness Of Breath] : no shortness of breath [Abdominal Pain] : abdominal pain [see HPI] : see HPI [Joint Pain] : joint pain [Dizziness] : dizziness [Under Stress] : not under stress [Negative] : Heme/Lymph [FreeTextEntry1] : poor sleep

## 2020-10-09 NOTE — DISCUSSION/SUMMARY
[FreeTextEntry1] : stable exam, check labs, flu vax given\par HTN stable on meds\par Lipids cont statin\par carotids with minimal plaque

## 2020-10-09 NOTE — HISTORY OF PRESENT ILLNESS
[FreeTextEntry1] : multiple orthopedic c/o\par otherwise well, no new c/o\par HTN- bp controlled\par Lipids on statin\par carotid stenosis- for duplex

## 2020-10-11 ENCOUNTER — RX RENEWAL (OUTPATIENT)
Age: 75
End: 2020-10-11

## 2020-10-11 LAB
25(OH)D3 SERPL-MCNC: 74.9 NG/ML
ALBUMIN SERPL ELPH-MCNC: 4.6 G/DL
ALP BLD-CCNC: 115 U/L
ALT SERPL-CCNC: 13 U/L
ANION GAP SERPL CALC-SCNC: 11 MMOL/L
AST SERPL-CCNC: 20 U/L
BASOPHILS # BLD AUTO: 0.09 K/UL
BASOPHILS NFR BLD AUTO: 2 %
BILIRUB SERPL-MCNC: 0.2 MG/DL
BUN SERPL-MCNC: 14 MG/DL
CALCIUM SERPL-MCNC: 10.1 MG/DL
CHLORIDE SERPL-SCNC: 104 MMOL/L
CHOLEST SERPL-MCNC: 180 MG/DL
CHOLEST/HDLC SERPL: 4 RATIO
CO2 SERPL-SCNC: 28 MMOL/L
CREAT SERPL-MCNC: 1.01 MG/DL
EOSINOPHIL # BLD AUTO: 0.16 K/UL
EOSINOPHIL NFR BLD AUTO: 3.6 %
ESTIMATED AVERAGE GLUCOSE: 134 MG/DL
GLUCOSE SERPL-MCNC: 92 MG/DL
HBA1C MFR BLD HPLC: 6.3 %
HCT VFR BLD CALC: 38 %
HDLC SERPL-MCNC: 45 MG/DL
HGB BLD-MCNC: 10.8 G/DL
IMM GRANULOCYTES NFR BLD AUTO: 0.2 %
LDLC SERPL CALC-MCNC: 105 MG/DL
LYMPHOCYTES # BLD AUTO: 1.34 K/UL
LYMPHOCYTES NFR BLD AUTO: 30 %
MAN DIFF?: NORMAL
MCHC RBC-ENTMCNC: 25.7 PG
MCHC RBC-ENTMCNC: 28.4 GM/DL
MCV RBC AUTO: 90.3 FL
MONOCYTES # BLD AUTO: 0.42 K/UL
MONOCYTES NFR BLD AUTO: 9.4 %
NEUTROPHILS # BLD AUTO: 2.44 K/UL
NEUTROPHILS NFR BLD AUTO: 54.8 %
PLATELET # BLD AUTO: 371 K/UL
POTASSIUM SERPL-SCNC: 4.8 MMOL/L
PROT SERPL-MCNC: 6.9 G/DL
RBC # BLD: 4.21 M/UL
RBC # FLD: 16 %
SODIUM SERPL-SCNC: 143 MMOL/L
TRIGL SERPL-MCNC: 150 MG/DL
TSH SERPL-ACNC: 1.39 UIU/ML
WBC # FLD AUTO: 4.46 K/UL

## 2020-10-12 ENCOUNTER — APPOINTMENT (OUTPATIENT)
Dept: PHYSICAL MEDICINE AND REHAB | Facility: CLINIC | Age: 75
End: 2020-10-12
Payer: MEDICARE

## 2020-10-12 VITALS — WEIGHT: 180 LBS | BODY MASS INDEX: 29.99 KG/M2 | RESPIRATION RATE: 16 BRPM | OXYGEN SATURATION: 96 % | HEIGHT: 65 IN

## 2020-10-12 DIAGNOSIS — M43.16 SPONDYLOLISTHESIS, LUMBAR REGION: ICD-10-CM

## 2020-10-12 PROCEDURE — 99204 OFFICE O/P NEW MOD 45 MIN: CPT

## 2020-10-13 NOTE — ASSESSMENT
[FreeTextEntry1] : Impression:\par 1. Lumbar Spondylosis/Facet Disease\par \par Plan: After review of the history and physical examination the patient's symptoms are consistent with Lumbar Spondylosis/Facet Disease. The diagnosis was discussed in detail with the patient. We discussed all the potential treatment options including physical therapy, oral medication, interventional spine procedures, and surgery; as well as alternative therapeutics such as acupuncture and massage. We also discussed the importance of weight loss, ergonomics, and posture in the long term management of the condition. At this time I am recommending that the patient undergo an MRI of the LSpine to further evaluate for disc pathology and nerve root involvement. We will likely plan for MBB/RFA depending on the MR findings. I have also given a prescription for Etodolac to use for symptomatic relief. We discussed the medication in detail with regard to appropriate use, adverse effects, and expected outcome. The patient will followup in 1 week. The patient expressed verbal understanding and is in agreement with the plan of care. All of the patient's questions and concerns were addressed during today's visit.

## 2020-10-13 NOTE — HISTORY OF PRESENT ILLNESS
[FreeTextEntry1] : Ms. VIKASH WHITTAKER is a very pleasant 75 year female who comes in for evaluation of back pain that has been ongoing for 1 year due to falls. The pain is located primarily on her back without any radiating symptoms to the extremities intermittent in nature and described as sharp. The pain is rated as 6/10 during today's visit, and ranges from 6-9/10. The patient's symptoms are aggravated by standing, getting up from a bed, walking and alleviated by laying down or rest. The patient denies any night pain, numbness/tingling, weakness, or bowel/bladder dysfunction. The patient has no other complaints at this time.\par

## 2020-10-13 NOTE — PHYSICAL EXAM
[FreeTextEntry1] : GEN: AAOx3, NAD.\par PSYCH: Normal mood and affect. Responds appropriately to commands.\par EYES: Sclerae Anicteric. No discharge. EOMI.\par RESP: Breathing unlabored.\par CV: DP pulses 2+ and equal. No varicosities noted.\par EXT: No C/C/E.\par SKIN: No lesions noted.\par STRENGTH: 5/5 bilateral hip flexors, knee extensors, knee flexors, ankle dorsiflexors, long toe extensors, ankle plantar flexors, hip extensors, hip abductors.\par TONE: Normal, No clonus.\par REFLEXES: Symmetric patella, medial hamstring, achilles. Plantars downgoing bilaterally.\par SENS: Grossly intact to light touch bilateral lower extremities.\par INSP: Spine alignment is midline, with no evidence of scoliosis.\par STANCE: Single leg stance deferred.\par GAIT: Non antalgic, normal reciprocating heel to toe\par LUMBAR ROM: Flexion full/pain free. Extension, rotation, oblique extension limited (+) axial Sx. \par HIP ROM: Full and pain free bilaterally.\par PALP: There is no tenderness over the midline spinous processes, paravertebral muscles, SIJ, or greater trochanters bilaterally.\par SPECIAL: SLR and Slump test negative bilaterally.

## 2020-10-13 NOTE — DATA REVIEWED
[FreeTextEntry1] : MR LS 8/2012: Grade I spondylolisthesis at L4-L5, with bilateral facet hypertrophy results in severe central spinal canal stenosis. Multilevel bilateral facet degeneration.

## 2020-10-20 ENCOUNTER — APPOINTMENT (OUTPATIENT)
Dept: ORTHOPEDIC SURGERY | Facility: CLINIC | Age: 75
End: 2020-10-20
Payer: MEDICARE

## 2020-10-20 PROCEDURE — 99214 OFFICE O/P EST MOD 30 MIN: CPT

## 2020-10-21 DIAGNOSIS — Z47.1 AFTERCARE FOLLOWING JOINT REPLACEMENT SURGERY: ICD-10-CM

## 2020-10-21 DIAGNOSIS — Z96.652 AFTERCARE FOLLOWING JOINT REPLACEMENT SURGERY: ICD-10-CM

## 2020-10-21 NOTE — HISTORY OF PRESENT ILLNESS
[de-identified] : 76 y/o F, Peruvian speaking, presents with her son as her , s/p right TKR, 17 years ago,left TKR 3 years ago and right shoulder replacement with Dr. Granado here for second opinion of right knee pain. Pt has seen Dr. Iniguez who took x-ray of her right knee, was told it showed a normal TKR. Pt had a fall in November of last year, since then she started to have right knee pain. She went to the ER after her fall, had x-rays done that showed no abnormalities of the right knee. Pt has a history of arthritis in her back. She is scheduled tomorrow for a injection and MRI of her lower back. Denies any recent sick contact exposure.

## 2020-10-21 NOTE — ASSESSMENT
[FreeTextEntry1] : Assessment\par S/p right TKR, 17 years ago\par \par Plan\par Will  demonstrate at home strengthening exercises \par

## 2020-10-21 NOTE — PHYSICAL EXAM
[de-identified] : General: Not in acute distress, dressed appropriately, sitting on examination table\par Skin: Warm and dry, normal turgor, no rashes\par Neurological: AOx3, Cranial nerves grossly in tact\par Psych: Mood and affect appropriate\par \par Right Knee: Minimal swelling with mild warmth. Well healed anteriorly midline surgical incision. No swelling edema erythema redness or drainage. tender anteriorly. Neutral alignment. ROM: 0-90, pain with flexion. No tibial or joint line tenderness. Instable. 5/5 Strength. DNVI. Ambulates with a cane. Right paraspinal tenderness. \par \par

## 2020-10-22 ENCOUNTER — APPOINTMENT (OUTPATIENT)
Dept: MRI IMAGING | Facility: HOSPITAL | Age: 75
End: 2020-10-22
Payer: MEDICARE

## 2020-10-22 ENCOUNTER — OUTPATIENT (OUTPATIENT)
Dept: OUTPATIENT SERVICES | Facility: HOSPITAL | Age: 75
LOS: 1 days | End: 2020-10-22
Payer: MEDICARE

## 2020-10-22 DIAGNOSIS — Z96.641 PRESENCE OF RIGHT ARTIFICIAL HIP JOINT: Chronic | ICD-10-CM

## 2020-10-22 DIAGNOSIS — Z96.651 PRESENCE OF RIGHT ARTIFICIAL KNEE JOINT: Chronic | ICD-10-CM

## 2020-10-22 PROCEDURE — 72148 MRI LUMBAR SPINE W/O DYE: CPT | Mod: 26

## 2020-10-22 PROCEDURE — 72148 MRI LUMBAR SPINE W/O DYE: CPT

## 2020-10-29 ENCOUNTER — APPOINTMENT (OUTPATIENT)
Dept: PHYSICAL MEDICINE AND REHAB | Facility: CLINIC | Age: 75
End: 2020-10-29
Payer: MEDICARE

## 2020-10-29 PROCEDURE — 64493 INJ PARAVERT F JNT L/S 1 LEV: CPT | Mod: 50

## 2020-10-29 PROCEDURE — 64494 INJ PARAVERT F JNT L/S 2 LEV: CPT | Mod: 50

## 2020-10-29 PROCEDURE — 64495 INJ PARAVERT F JNT L/S 3 LEV: CPT | Mod: 50

## 2020-11-02 ENCOUNTER — RX RENEWAL (OUTPATIENT)
Age: 75
End: 2020-11-02

## 2020-12-02 ENCOUNTER — TRANSCRIPTION ENCOUNTER (OUTPATIENT)
Age: 75
End: 2020-12-02

## 2020-12-04 ENCOUNTER — TRANSCRIPTION ENCOUNTER (OUTPATIENT)
Age: 75
End: 2020-12-04

## 2020-12-04 ENCOUNTER — RX RENEWAL (OUTPATIENT)
Age: 75
End: 2020-12-04

## 2020-12-14 ENCOUNTER — APPOINTMENT (OUTPATIENT)
Dept: HEART AND VASCULAR | Facility: CLINIC | Age: 75
End: 2020-12-14

## 2021-01-08 ENCOUNTER — RX RENEWAL (OUTPATIENT)
Age: 76
End: 2021-01-08

## 2021-02-08 ENCOUNTER — RX RENEWAL (OUTPATIENT)
Age: 76
End: 2021-02-08

## 2021-03-22 ENCOUNTER — TRANSCRIPTION ENCOUNTER (OUTPATIENT)
Age: 76
End: 2021-03-22

## 2021-03-22 ENCOUNTER — APPOINTMENT (OUTPATIENT)
Dept: ORTHOPEDIC SURGERY | Facility: HOSPITAL | Age: 76
End: 2021-03-22

## 2021-03-24 ENCOUNTER — APPOINTMENT (OUTPATIENT)
Dept: HEART AND VASCULAR | Facility: CLINIC | Age: 76
End: 2021-03-24
Payer: MEDICARE

## 2021-03-24 VITALS
BODY MASS INDEX: 30.82 KG/M2 | WEIGHT: 185 LBS | HEART RATE: 61 BPM | RESPIRATION RATE: 16 BRPM | HEIGHT: 65 IN | DIASTOLIC BLOOD PRESSURE: 70 MMHG | TEMPERATURE: 98 F | OXYGEN SATURATION: 57 % | SYSTOLIC BLOOD PRESSURE: 136 MMHG

## 2021-03-24 DIAGNOSIS — Z01.818 ENCOUNTER FOR OTHER PREPROCEDURAL EXAMINATION: ICD-10-CM

## 2021-03-24 PROCEDURE — 99214 OFFICE O/P EST MOD 30 MIN: CPT

## 2021-03-24 PROCEDURE — 36415 COLL VENOUS BLD VENIPUNCTURE: CPT

## 2021-03-24 PROCEDURE — 93306 TTE W/DOPPLER COMPLETE: CPT

## 2021-03-24 PROCEDURE — 93000 ELECTROCARDIOGRAM COMPLETE: CPT | Mod: NC

## 2021-03-24 NOTE — HISTORY OF PRESENT ILLNESS
[FreeTextEntry1] : for evaluation prior to knee surgery\par limited functional capacity due to pain\par no cardiac c/o\par HTN stable on meds\par Lipids on statin

## 2021-03-24 NOTE — REVIEW OF SYSTEMS
[Feeling Fatigued] : not feeling fatigued [Shortness Of Breath] : no shortness of breath [Abdominal Pain] : no abdominal pain [see HPI] : see HPI [Joint Pain] : joint pain [Dizziness] : dizziness [Under Stress] : not under stress [Negative] : Heme/Lymph [FreeTextEntry1] : poor sleep

## 2021-03-24 NOTE — DISCUSSION/SUMMARY
[FreeTextEntry1] : stable exam and echo\par medically optimized planned surgery\par \par Labs pending

## 2021-03-25 LAB
ALBUMIN SERPL ELPH-MCNC: 4.3 G/DL
ALP BLD-CCNC: 123 U/L
ALT SERPL-CCNC: 13 U/L
ANION GAP SERPL CALC-SCNC: 11 MMOL/L
APTT BLD: 29.7 SEC
AST SERPL-CCNC: 19 U/L
BASOPHILS # BLD AUTO: 0.13 K/UL
BASOPHILS NFR BLD AUTO: 2.2 %
BILIRUB SERPL-MCNC: 0.2 MG/DL
BUN SERPL-MCNC: 15 MG/DL
CALCIUM SERPL-MCNC: 10.6 MG/DL
CHLORIDE SERPL-SCNC: 102 MMOL/L
CHOLEST SERPL-MCNC: 197 MG/DL
CO2 SERPL-SCNC: 28 MMOL/L
COVID-19 NUCLEOCAPSID  GAM ANTIBODY INTERPRETATION: NEGATIVE
CREAT SERPL-MCNC: 0.97 MG/DL
EOSINOPHIL # BLD AUTO: 0.19 K/UL
EOSINOPHIL NFR BLD AUTO: 3.2 %
GLUCOSE SERPL-MCNC: 82 MG/DL
HCT VFR BLD CALC: 40.6 %
HDLC SERPL-MCNC: 42 MG/DL
HGB BLD-MCNC: 11.8 G/DL
IMM GRANULOCYTES NFR BLD AUTO: 0.3 %
INR PPP: 1.02 RATIO
LDLC SERPL CALC-MCNC: 117 MG/DL
LYMPHOCYTES # BLD AUTO: 1.96 K/UL
LYMPHOCYTES NFR BLD AUTO: 33.1 %
MAN DIFF?: NORMAL
MCHC RBC-ENTMCNC: 26.8 PG
MCHC RBC-ENTMCNC: 29.1 GM/DL
MCV RBC AUTO: 92.1 FL
MONOCYTES # BLD AUTO: 0.54 K/UL
MONOCYTES NFR BLD AUTO: 9.1 %
NEUTROPHILS # BLD AUTO: 3.08 K/UL
NEUTROPHILS NFR BLD AUTO: 52.1 %
NONHDLC SERPL-MCNC: 154 MG/DL
PLATELET # BLD AUTO: 408 K/UL
POTASSIUM SERPL-SCNC: 4.8 MMOL/L
PROT SERPL-MCNC: 7.1 G/DL
PT BLD: 12 SEC
RBC # BLD: 4.41 M/UL
RBC # FLD: 14.2 %
SARS-COV-2 AB SERPL QL IA: 0.08 INDEX
SODIUM SERPL-SCNC: 142 MMOL/L
TRIGL SERPL-MCNC: 187 MG/DL
WBC # FLD AUTO: 5.92 K/UL

## 2021-03-26 ENCOUNTER — LABORATORY RESULT (OUTPATIENT)
Age: 76
End: 2021-03-26

## 2021-03-26 ENCOUNTER — RESULT REVIEW (OUTPATIENT)
Age: 76
End: 2021-03-26

## 2021-03-26 ENCOUNTER — OUTPATIENT (OUTPATIENT)
Dept: OUTPATIENT SERVICES | Facility: HOSPITAL | Age: 76
LOS: 1 days | End: 2021-03-26
Payer: MEDICARE

## 2021-03-26 DIAGNOSIS — Z96.641 PRESENCE OF RIGHT ARTIFICIAL HIP JOINT: Chronic | ICD-10-CM

## 2021-03-26 DIAGNOSIS — Z96.651 PRESENCE OF RIGHT ARTIFICIAL KNEE JOINT: Chronic | ICD-10-CM

## 2021-03-26 PROCEDURE — 71046 X-RAY EXAM CHEST 2 VIEWS: CPT | Mod: 26

## 2021-03-26 RX ORDER — LOSARTAN POTASSIUM 100 MG/1
1 TABLET, FILM COATED ORAL
Qty: 0 | Refills: 0 | DISCHARGE

## 2021-03-26 RX ORDER — GABAPENTIN 400 MG/1
1 CAPSULE ORAL
Qty: 0 | Refills: 0 | DISCHARGE

## 2021-03-28 ENCOUNTER — TRANSCRIPTION ENCOUNTER (OUTPATIENT)
Age: 76
End: 2021-03-28

## 2021-03-29 ENCOUNTER — APPOINTMENT (OUTPATIENT)
Dept: ORTHOPEDIC SURGERY | Facility: HOSPITAL | Age: 76
End: 2021-03-29
Payer: MEDICARE

## 2021-03-29 ENCOUNTER — INPATIENT (INPATIENT)
Facility: HOSPITAL | Age: 76
LOS: 0 days | Discharge: HOME HEALTH | DRG: 488 | End: 2021-03-30
Attending: ORTHOPAEDIC SURGERY | Admitting: ORTHOPAEDIC SURGERY
Payer: MEDICARE

## 2021-03-29 VITALS
OXYGEN SATURATION: 96 % | HEART RATE: 81 BPM | TEMPERATURE: 98 F | DIASTOLIC BLOOD PRESSURE: 87 MMHG | SYSTOLIC BLOOD PRESSURE: 150 MMHG | RESPIRATION RATE: 18 BRPM | HEIGHT: 64 IN | WEIGHT: 193.79 LBS

## 2021-03-29 DIAGNOSIS — Z96.651 PRESENCE OF RIGHT ARTIFICIAL KNEE JOINT: Chronic | ICD-10-CM

## 2021-03-29 DIAGNOSIS — Z96.652 PRESENCE OF LEFT ARTIFICIAL KNEE JOINT: Chronic | ICD-10-CM

## 2021-03-29 DIAGNOSIS — Z96.642 PRESENCE OF LEFT ARTIFICIAL HIP JOINT: Chronic | ICD-10-CM

## 2021-03-29 DIAGNOSIS — Z41.9 ENCOUNTER FOR PROCEDURE FOR PURPOSES OTHER THAN REMEDYING HEALTH STATE, UNSPECIFIED: Chronic | ICD-10-CM

## 2021-03-29 DIAGNOSIS — Z96.641 PRESENCE OF RIGHT ARTIFICIAL HIP JOINT: Chronic | ICD-10-CM

## 2021-03-29 PROBLEM — M19.90 UNSPECIFIED OSTEOARTHRITIS, UNSPECIFIED SITE: Chronic | Status: ACTIVE | Noted: 2021-03-26

## 2021-03-29 PROBLEM — M43.10 SPONDYLOLISTHESIS, SITE UNSPECIFIED: Chronic | Status: ACTIVE | Noted: 2021-03-26

## 2021-03-29 PROBLEM — I35.9 NONRHEUMATIC AORTIC VALVE DISORDER, UNSPECIFIED: Chronic | Status: ACTIVE | Noted: 2021-03-26

## 2021-03-29 LAB
APPEARANCE: CLEAR
BILIRUBIN URINE: NEGATIVE
BLOOD URINE: NEGATIVE
COLOR: NORMAL
GLUCOSE QUALITATIVE U: NEGATIVE
KETONES URINE: NEGATIVE
LEUKOCYTE ESTERASE URINE: NEGATIVE
NITRITE URINE: NEGATIVE
PH URINE: 7.5
PROTEIN URINE: NEGATIVE
SPECIFIC GRAVITY URINE: 1
UROBILINOGEN URINE: NORMAL

## 2021-03-29 PROCEDURE — 81001 URINALYSIS AUTO W/SCOPE: CPT

## 2021-03-29 PROCEDURE — 85027 COMPLETE CBC AUTOMATED: CPT

## 2021-03-29 PROCEDURE — 27486 REVISE/REPLACE KNEE JOINT: CPT | Mod: 52,RT

## 2021-03-29 PROCEDURE — 97162 PT EVAL MOD COMPLEX 30 MIN: CPT

## 2021-03-29 PROCEDURE — 97161 PT EVAL LOW COMPLEX 20 MIN: CPT

## 2021-03-29 PROCEDURE — 73560 X-RAY EXAM OF KNEE 1 OR 2: CPT | Mod: 26,RT

## 2021-03-29 PROCEDURE — C1776: CPT

## 2021-03-29 PROCEDURE — C2625: CPT

## 2021-03-29 PROCEDURE — 87086 URINE CULTURE/COLONY COUNT: CPT

## 2021-03-29 PROCEDURE — 85025 COMPLETE CBC W/AUTO DIFF WBC: CPT

## 2021-03-29 PROCEDURE — C1713: CPT

## 2021-03-29 PROCEDURE — 76000 FLUOROSCOPY <1 HR PHYS/QHP: CPT

## 2021-03-29 PROCEDURE — 86850 RBC ANTIBODY SCREEN: CPT

## 2021-03-29 PROCEDURE — 86900 BLOOD TYPING SEROLOGIC ABO: CPT

## 2021-03-29 PROCEDURE — 71045 X-RAY EXAM CHEST 1 VIEW: CPT

## 2021-03-29 PROCEDURE — 36415 COLL VENOUS BLD VENIPUNCTURE: CPT

## 2021-03-29 PROCEDURE — 80048 BASIC METABOLIC PNL TOTAL CA: CPT

## 2021-03-29 PROCEDURE — 86901 BLOOD TYPING SEROLOGIC RH(D): CPT

## 2021-03-29 PROCEDURE — 97535 SELF CARE MNGMENT TRAINING: CPT

## 2021-03-29 RX ORDER — ASPIRIN/CALCIUM CARB/MAGNESIUM 324 MG
81 TABLET ORAL
Refills: 0 | Status: DISCONTINUED | OUTPATIENT
Start: 2021-03-29 | End: 2021-03-29

## 2021-03-29 RX ORDER — FUROSEMIDE 40 MG
20 TABLET ORAL DAILY
Refills: 0 | Status: DISCONTINUED | OUTPATIENT
Start: 2021-03-29 | End: 2021-03-29

## 2021-03-29 RX ORDER — GABAPENTIN 400 MG/1
300 CAPSULE ORAL
Refills: 0 | Status: DISCONTINUED | OUTPATIENT
Start: 2021-03-29 | End: 2021-03-29

## 2021-03-29 RX ORDER — ACETAMINOPHEN 500 MG
650 TABLET ORAL EVERY 6 HOURS
Refills: 0 | Status: DISCONTINUED | OUTPATIENT
Start: 2021-03-29 | End: 2021-03-30

## 2021-03-29 RX ORDER — ATENOLOL 25 MG/1
25 TABLET ORAL AT BEDTIME
Refills: 0 | Status: DISCONTINUED | OUTPATIENT
Start: 2021-03-29 | End: 2021-03-29

## 2021-03-29 RX ORDER — ATENOLOL 25 MG/1
1 TABLET ORAL
Qty: 0 | Refills: 0 | DISCHARGE

## 2021-03-29 RX ORDER — LOSARTAN POTASSIUM 100 MG/1
100 TABLET, FILM COATED ORAL DAILY
Refills: 0 | Status: DISCONTINUED | OUTPATIENT
Start: 2021-03-29 | End: 2021-03-29

## 2021-03-29 RX ORDER — POVIDONE-IODINE 5 %
1 AEROSOL (ML) TOPICAL ONCE
Refills: 0 | Status: DISCONTINUED | OUTPATIENT
Start: 2021-03-29 | End: 2021-03-29

## 2021-03-29 RX ORDER — OXYCODONE HYDROCHLORIDE 5 MG/1
10 TABLET ORAL
Refills: 0 | Status: DISCONTINUED | OUTPATIENT
Start: 2021-03-29 | End: 2021-03-30

## 2021-03-29 RX ORDER — CEFAZOLIN SODIUM 1 G
2000 VIAL (EA) INJECTION EVERY 8 HOURS
Refills: 0 | Status: COMPLETED | OUTPATIENT
Start: 2021-03-29 | End: 2021-03-30

## 2021-03-29 RX ORDER — HYDRALAZINE HCL 50 MG
5 TABLET ORAL ONCE
Refills: 0 | Status: COMPLETED | OUTPATIENT
Start: 2021-03-29 | End: 2021-03-29

## 2021-03-29 RX ORDER — SERTRALINE 25 MG/1
50 TABLET, FILM COATED ORAL DAILY
Refills: 0 | Status: DISCONTINUED | OUTPATIENT
Start: 2021-03-29 | End: 2021-03-29

## 2021-03-29 RX ORDER — BUPIVACAINE 13.3 MG/ML
20 INJECTION, SUSPENSION, LIPOSOMAL INFILTRATION ONCE
Refills: 0 | Status: DISCONTINUED | OUTPATIENT
Start: 2021-03-29 | End: 2021-03-29

## 2021-03-29 RX ORDER — OXYCODONE HYDROCHLORIDE 5 MG/1
5 TABLET ORAL
Refills: 0 | Status: DISCONTINUED | OUTPATIENT
Start: 2021-03-29 | End: 2021-03-30

## 2021-03-29 RX ORDER — SODIUM CHLORIDE 9 MG/ML
1000 INJECTION, SOLUTION INTRAVENOUS
Refills: 0 | Status: DISCONTINUED | OUTPATIENT
Start: 2021-03-30 | End: 2021-03-30

## 2021-03-29 RX ORDER — SIMVASTATIN 20 MG/1
10 TABLET, FILM COATED ORAL AT BEDTIME
Refills: 0 | Status: DISCONTINUED | OUTPATIENT
Start: 2021-03-29 | End: 2021-03-29

## 2021-03-29 RX ORDER — SIMVASTATIN 20 MG/1
1 TABLET, FILM COATED ORAL
Qty: 0 | Refills: 0 | DISCHARGE

## 2021-03-29 RX ORDER — MECLIZINE HCL 12.5 MG
25 TABLET ORAL DAILY
Refills: 0 | Status: DISCONTINUED | OUTPATIENT
Start: 2021-03-29 | End: 2021-03-29

## 2021-03-29 RX ORDER — HYDROMORPHONE HYDROCHLORIDE 2 MG/ML
0.5 INJECTION INTRAMUSCULAR; INTRAVENOUS; SUBCUTANEOUS
Refills: 0 | Status: DISCONTINUED | OUTPATIENT
Start: 2021-03-29 | End: 2021-03-30

## 2021-03-29 RX ORDER — CHLORHEXIDINE GLUCONATE 213 G/1000ML
1 SOLUTION TOPICAL EVERY 12 HOURS
Refills: 0 | Status: DISCONTINUED | OUTPATIENT
Start: 2021-03-29 | End: 2021-03-29

## 2021-03-29 RX ORDER — ASPIRIN/CALCIUM CARB/MAGNESIUM 324 MG
81 TABLET ORAL
Refills: 0 | Status: DISCONTINUED | OUTPATIENT
Start: 2021-03-29 | End: 2021-03-30

## 2021-03-29 RX ADMIN — OXYCODONE HYDROCHLORIDE 5 MILLIGRAM(S): 5 TABLET ORAL at 22:43

## 2021-03-29 RX ADMIN — Medication 650 MILLIGRAM(S): at 17:37

## 2021-03-29 RX ADMIN — OXYCODONE HYDROCHLORIDE 5 MILLIGRAM(S): 5 TABLET ORAL at 21:43

## 2021-03-29 RX ADMIN — OXYCODONE HYDROCHLORIDE 5 MILLIGRAM(S): 5 TABLET ORAL at 17:37

## 2021-03-29 RX ADMIN — Medication 5 MILLIGRAM(S): at 15:12

## 2021-03-29 RX ADMIN — Medication 100 MILLIGRAM(S): at 21:43

## 2021-03-29 RX ADMIN — OXYCODONE HYDROCHLORIDE 5 MILLIGRAM(S): 5 TABLET ORAL at 18:37

## 2021-03-29 RX ADMIN — Medication 5 MILLIGRAM(S): at 16:03

## 2021-03-29 RX ADMIN — Medication 81 MILLIGRAM(S): at 17:38

## 2021-03-29 RX ADMIN — Medication 650 MILLIGRAM(S): at 18:37

## 2021-03-29 NOTE — PHYSICAL THERAPY INITIAL EVALUATION ADULT - ADDITIONAL COMMENTS
Pt lives with Son in an elevator access apartment w/o steps to enter. Pt has a HHA everyday that stays until 3pm and a cook that comes from 5-8pm. The Pt's Son works from home and is present all day.

## 2021-03-29 NOTE — BRIEF OPERATIVE NOTE - NSICDXBRIEFPOSTOP_GEN_ALL_CORE_FT
POST-OP DIAGNOSIS:  S/P right knee surgery 29-Mar-2021 11:02:35  Chago Goldberg  
POST-OP DIAGNOSIS:  S/P right knee surgery 29-Mar-2021 11:02:35  Chago Goldberg

## 2021-03-29 NOTE — BRIEF OPERATIVE NOTE - NSICDXBRIEFPREOP_GEN_ALL_CORE_FT
PRE-OP DIAGNOSIS:  Instability of prosthetic knee 29-Mar-2021 11:02:20  Chago Goldberg  
PRE-OP DIAGNOSIS:  Instability of prosthetic knee 29-Mar-2021 11:02:20  Chago Goldberg

## 2021-03-29 NOTE — BRIEF OPERATIVE NOTE - NSICDXBRIEFPROCEDURE_GEN_ALL_CORE_FT
PROCEDURES:  Replacement of polyethylene liner of right total knee arthroplasty 29-Mar-2021 11:07:22  Kp Mitchell  
PROCEDURES:  Replacement of right knee joint with synthetic substitute, uncemented, open approach 29-Mar-2021 11:02:04  Chago Goldberg

## 2021-03-29 NOTE — PHYSICAL THERAPY INITIAL EVALUATION ADULT - PERTINENT HX OF CURRENT PROBLEM, REHAB EVAL
Pt is 74 y/o F presents for replacement of right knee joint with synthetic substitute, uncemented, open approach.

## 2021-03-30 ENCOUNTER — TRANSCRIPTION ENCOUNTER (OUTPATIENT)
Age: 76
End: 2021-03-30

## 2021-03-30 VITALS — DIASTOLIC BLOOD PRESSURE: 78 MMHG | SYSTOLIC BLOOD PRESSURE: 143 MMHG | HEART RATE: 89 BPM

## 2021-03-30 LAB
ANION GAP SERPL CALC-SCNC: 12 MMOL/L — SIGNIFICANT CHANGE UP (ref 5–17)
BUN SERPL-MCNC: 10 MG/DL — SIGNIFICANT CHANGE UP (ref 7–23)
CALCIUM SERPL-MCNC: 9.9 MG/DL — SIGNIFICANT CHANGE UP (ref 8.4–10.5)
CHLORIDE SERPL-SCNC: 104 MMOL/L — SIGNIFICANT CHANGE UP (ref 96–108)
CO2 SERPL-SCNC: 24 MMOL/L — SIGNIFICANT CHANGE UP (ref 22–31)
COVID-19 SPIKE DOMAIN AB INTERP: NEGATIVE — SIGNIFICANT CHANGE UP
COVID-19 SPIKE DOMAIN ANTIBODY RESULT: 0.4 U/ML — SIGNIFICANT CHANGE UP
CREAT SERPL-MCNC: 0.82 MG/DL — SIGNIFICANT CHANGE UP (ref 0.5–1.3)
GLUCOSE SERPL-MCNC: 132 MG/DL — HIGH (ref 70–99)
HCT VFR BLD CALC: 33.6 % — LOW (ref 34.5–45)
HGB BLD-MCNC: 10.3 G/DL — LOW (ref 11.5–15.5)
MCHC RBC-ENTMCNC: 26.3 PG — LOW (ref 27–34)
MCHC RBC-ENTMCNC: 30.7 GM/DL — LOW (ref 32–36)
MCV RBC AUTO: 85.9 FL — SIGNIFICANT CHANGE UP (ref 80–100)
NRBC # BLD: 0 /100 WBCS — SIGNIFICANT CHANGE UP (ref 0–0)
PLATELET # BLD AUTO: 317 K/UL — SIGNIFICANT CHANGE UP (ref 150–400)
POTASSIUM SERPL-MCNC: 3.8 MMOL/L — SIGNIFICANT CHANGE UP (ref 3.5–5.3)
POTASSIUM SERPL-SCNC: 3.8 MMOL/L — SIGNIFICANT CHANGE UP (ref 3.5–5.3)
RBC # BLD: 3.91 M/UL — SIGNIFICANT CHANGE UP (ref 3.8–5.2)
RBC # FLD: 14.4 % — SIGNIFICANT CHANGE UP (ref 10.3–14.5)
SARS-COV-2 IGG+IGM SERPL QL IA: 0.4 U/ML — SIGNIFICANT CHANGE UP
SARS-COV-2 IGG+IGM SERPL QL IA: NEGATIVE — SIGNIFICANT CHANGE UP
SODIUM SERPL-SCNC: 140 MMOL/L — SIGNIFICANT CHANGE UP (ref 135–145)
WBC # BLD: 10.68 K/UL — HIGH (ref 3.8–10.5)
WBC # FLD AUTO: 10.68 K/UL — HIGH (ref 3.8–10.5)

## 2021-03-30 PROCEDURE — C1713: CPT

## 2021-03-30 PROCEDURE — C1776: CPT

## 2021-03-30 PROCEDURE — 80048 BASIC METABOLIC PNL TOTAL CA: CPT

## 2021-03-30 PROCEDURE — 27486 REVISE/REPLACE KNEE JOINT: CPT

## 2021-03-30 PROCEDURE — 99222 1ST HOSP IP/OBS MODERATE 55: CPT

## 2021-03-30 PROCEDURE — 73560 X-RAY EXAM OF KNEE 1 OR 2: CPT

## 2021-03-30 PROCEDURE — 36415 COLL VENOUS BLD VENIPUNCTURE: CPT

## 2021-03-30 PROCEDURE — 71046 X-RAY EXAM CHEST 2 VIEWS: CPT

## 2021-03-30 PROCEDURE — 86769 SARS-COV-2 COVID-19 ANTIBODY: CPT

## 2021-03-30 PROCEDURE — 85027 COMPLETE CBC AUTOMATED: CPT

## 2021-03-30 RX ORDER — PANTOPRAZOLE SODIUM 20 MG/1
40 TABLET, DELAYED RELEASE ORAL
Refills: 0 | Status: DISCONTINUED | OUTPATIENT
Start: 2021-03-31 | End: 2021-03-30

## 2021-03-30 RX ORDER — KETOROLAC TROMETHAMINE 30 MG/ML
15 SYRINGE (ML) INJECTION EVERY 6 HOURS
Refills: 0 | Status: COMPLETED | OUTPATIENT
Start: 2021-03-30 | End: 2021-03-31

## 2021-03-30 RX ORDER — ACETAMINOPHEN 500 MG
2 TABLET ORAL
Qty: 0 | Refills: 0 | DISCHARGE
Start: 2021-03-30

## 2021-03-30 RX ORDER — GABAPENTIN 400 MG/1
300 CAPSULE ORAL
Refills: 0 | Status: DISCONTINUED | OUTPATIENT
Start: 2021-03-30 | End: 2021-03-30

## 2021-03-30 RX ORDER — LOSARTAN POTASSIUM 100 MG/1
100 TABLET, FILM COATED ORAL DAILY
Refills: 0 | Status: DISCONTINUED | OUTPATIENT
Start: 2021-03-30 | End: 2021-03-30

## 2021-03-30 RX ORDER — CELECOXIB 200 MG/1
200 CAPSULE ORAL
Refills: 0 | Status: DISCONTINUED | OUTPATIENT
Start: 2021-03-31 | End: 2021-03-30

## 2021-03-30 RX ORDER — TRAMADOL HYDROCHLORIDE 50 MG/1
50 TABLET ORAL EVERY 6 HOURS
Refills: 0 | Status: DISCONTINUED | OUTPATIENT
Start: 2021-03-30 | End: 2021-03-30

## 2021-03-30 RX ORDER — ASPIRIN/CALCIUM CARB/MAGNESIUM 324 MG
1 TABLET ORAL
Qty: 60 | Refills: 0
Start: 2021-03-30 | End: 2021-04-28

## 2021-03-30 RX ORDER — SERTRALINE 25 MG/1
50 TABLET, FILM COATED ORAL DAILY
Refills: 0 | Status: DISCONTINUED | OUTPATIENT
Start: 2021-03-30 | End: 2021-03-30

## 2021-03-30 RX ORDER — ONDANSETRON 8 MG/1
4 TABLET, FILM COATED ORAL EVERY 6 HOURS
Refills: 0 | Status: DISCONTINUED | OUTPATIENT
Start: 2021-03-30 | End: 2021-03-30

## 2021-03-30 RX ORDER — POLYETHYLENE GLYCOL 3350 17 G/17G
17 POWDER, FOR SOLUTION ORAL DAILY
Refills: 0 | Status: DISCONTINUED | OUTPATIENT
Start: 2021-03-30 | End: 2021-03-30

## 2021-03-30 RX ORDER — MECLIZINE HCL 12.5 MG
25 TABLET ORAL DAILY
Refills: 0 | Status: DISCONTINUED | OUTPATIENT
Start: 2021-03-30 | End: 2021-03-30

## 2021-03-30 RX ORDER — MELOXICAM 15 MG/1
1 TABLET ORAL
Qty: 14 | Refills: 0
Start: 2021-03-30 | End: 2021-04-12

## 2021-03-30 RX ORDER — ATENOLOL 25 MG/1
25 TABLET ORAL AT BEDTIME
Refills: 0 | Status: DISCONTINUED | OUTPATIENT
Start: 2021-03-30 | End: 2021-03-30

## 2021-03-30 RX ORDER — OXYCODONE HYDROCHLORIDE 5 MG/1
1 TABLET ORAL
Qty: 28 | Refills: 0
Start: 2021-03-30 | End: 2021-04-05

## 2021-03-30 RX ORDER — PANTOPRAZOLE SODIUM 20 MG/1
40 TABLET, DELAYED RELEASE ORAL ONCE
Refills: 0 | Status: COMPLETED | OUTPATIENT
Start: 2021-03-30 | End: 2021-03-30

## 2021-03-30 RX ORDER — SIMVASTATIN 20 MG/1
10 TABLET, FILM COATED ORAL AT BEDTIME
Refills: 0 | Status: DISCONTINUED | OUTPATIENT
Start: 2021-03-30 | End: 2021-03-30

## 2021-03-30 RX ORDER — SENNA PLUS 8.6 MG/1
2 TABLET ORAL AT BEDTIME
Refills: 0 | Status: DISCONTINUED | OUTPATIENT
Start: 2021-03-30 | End: 2021-03-30

## 2021-03-30 RX ORDER — OXYCODONE HYDROCHLORIDE 5 MG/1
5 TABLET ORAL EVERY 4 HOURS
Refills: 0 | Status: DISCONTINUED | OUTPATIENT
Start: 2021-03-30 | End: 2021-03-30

## 2021-03-30 RX ORDER — HYDROMORPHONE HYDROCHLORIDE 2 MG/ML
0.5 INJECTION INTRAMUSCULAR; INTRAVENOUS; SUBCUTANEOUS EVERY 4 HOURS
Refills: 0 | Status: DISCONTINUED | OUTPATIENT
Start: 2021-03-30 | End: 2021-03-30

## 2021-03-30 RX ORDER — MAGNESIUM HYDROXIDE 400 MG/1
30 TABLET, CHEWABLE ORAL DAILY
Refills: 0 | Status: DISCONTINUED | OUTPATIENT
Start: 2021-03-30 | End: 2021-03-30

## 2021-03-30 RX ORDER — FUROSEMIDE 40 MG
20 TABLET ORAL DAILY
Refills: 0 | Status: DISCONTINUED | OUTPATIENT
Start: 2021-03-30 | End: 2021-03-30

## 2021-03-30 RX ADMIN — Medication 650 MILLIGRAM(S): at 06:29

## 2021-03-30 RX ADMIN — PANTOPRAZOLE SODIUM 40 MILLIGRAM(S): 20 TABLET, DELAYED RELEASE ORAL at 13:55

## 2021-03-30 RX ADMIN — OXYCODONE HYDROCHLORIDE 10 MILLIGRAM(S): 5 TABLET ORAL at 04:55

## 2021-03-30 RX ADMIN — POLYETHYLENE GLYCOL 3350 17 GRAM(S): 17 POWDER, FOR SOLUTION ORAL at 13:07

## 2021-03-30 RX ADMIN — SERTRALINE 50 MILLIGRAM(S): 25 TABLET, FILM COATED ORAL at 13:07

## 2021-03-30 RX ADMIN — Medication 81 MILLIGRAM(S): at 05:29

## 2021-03-30 RX ADMIN — Medication 650 MILLIGRAM(S): at 00:15

## 2021-03-30 RX ADMIN — OXYCODONE HYDROCHLORIDE 5 MILLIGRAM(S): 5 TABLET ORAL at 13:34

## 2021-03-30 RX ADMIN — GABAPENTIN 300 MILLIGRAM(S): 400 CAPSULE ORAL at 17:32

## 2021-03-30 RX ADMIN — OXYCODONE HYDROCHLORIDE 5 MILLIGRAM(S): 5 TABLET ORAL at 14:34

## 2021-03-30 RX ADMIN — OXYCODONE HYDROCHLORIDE 5 MILLIGRAM(S): 5 TABLET ORAL at 10:33

## 2021-03-30 RX ADMIN — Medication 81 MILLIGRAM(S): at 17:32

## 2021-03-30 RX ADMIN — Medication 100 MILLIGRAM(S): at 05:30

## 2021-03-30 RX ADMIN — Medication 650 MILLIGRAM(S): at 17:32

## 2021-03-30 RX ADMIN — LOSARTAN POTASSIUM 100 MILLIGRAM(S): 100 TABLET, FILM COATED ORAL at 09:52

## 2021-03-30 RX ADMIN — Medication 650 MILLIGRAM(S): at 05:29

## 2021-03-30 RX ADMIN — OXYCODONE HYDROCHLORIDE 5 MILLIGRAM(S): 5 TABLET ORAL at 11:33

## 2021-03-30 RX ADMIN — OXYCODONE HYDROCHLORIDE 10 MILLIGRAM(S): 5 TABLET ORAL at 05:56

## 2021-03-30 RX ADMIN — Medication 650 MILLIGRAM(S): at 18:32

## 2021-03-30 RX ADMIN — Medication 650 MILLIGRAM(S): at 12:08

## 2021-03-30 RX ADMIN — Medication 20 MILLIGRAM(S): at 09:52

## 2021-03-30 RX ADMIN — Medication 650 MILLIGRAM(S): at 01:15

## 2021-03-30 RX ADMIN — Medication 650 MILLIGRAM(S): at 11:08

## 2021-03-30 NOTE — DISCHARGE NOTE PROVIDER - NSDCMRMEDTOKEN_GEN_ALL_CORE_FT
aspirin 81 mg oral tablet: orally once a day  atenolol 25 mg oral tablet: 1 tab(s) orally once a day (at bedtime)  furosemide 20 mg oral tablet: 1 tab(s) orally once a day  gabapentin 300 mg oral capsule: 1 cap(s) orally 2 times a day  losartan 100 mg oral tablet: 1 tab(s) orally once a day  meclizine 25 mg oral tablet:   meloxicam 7.5 mg oral tablet: 1 tab(s) orally once a day  pantoprazole 40 mg oral delayed release tablet: 1 tab(s) orally once a day  sertraline 50 mg oral tablet: 1 tab(s) orally once a day  simvastatin 10 mg oral tablet: 1 tab(s) orally once a day (at bedtime)  traMADol 50 mg oral tablet: 1 tab(s) orally every 6 hours, As Needed  Vitamin D2: 10,000 IU take as instructed by your prescribing provider   acetaminophen 325 mg oral tablet: 2 tab(s) orally every 6 hours  aspirin 81 mg oral delayed release tablet: 1 tab(s) orally 2 times a day for 30 days  atenolol 25 mg oral tablet: 1 tab(s) orally once a day (at bedtime)  furosemide 20 mg oral tablet: 1 tab(s) orally once a day  gabapentin 300 mg oral capsule: 1 cap(s) orally 2 times a day  losartan 100 mg oral tablet: 1 tab(s) orally once a day  meclizine 25 mg oral tablet:   meloxicam 15 mg oral tablet: 1 tab(s) orally once a day  oxyCODONE 5 mg oral tablet: 1 tab(s) orally every 6 hours, as needed for severe pain MDD:4  pantoprazole 40 mg oral delayed release tablet: 1 tab(s) orally once a day  sertraline 50 mg oral tablet: 1 tab(s) orally once a day  simvastatin 10 mg oral tablet: 1 tab(s) orally once a day (at bedtime)  Vitamin D2: 10,000 IU take as instructed by your prescribing provider

## 2021-03-30 NOTE — PROGRESS NOTE ADULT - SUBJECTIVE AND OBJECTIVE BOX
Ortho Note    Pt seen and examined. Comfortable without complaints, pain controlled  Denies CP, SOB, N/V, numbness/tingling.  Spoke with patient's son, who lives with and cares for patient,  on phone also to confirm plan of care for patient. Patient reports feeling "confused" overnight and not knowing where she was, and forgetting she had surgery. Currently A+Ox4, and was likely due to anesthesia + oxycodone 10mg.    Vital Signs Last 24 Hrs  T(C): 36.7 (03-30-21 @ 09:24), Max: 36.7 (03-30-21 @ 09:24)  T(F): 98 (03-30-21 @ 09:24), Max: 98 (03-30-21 @ 09:24)  HR: 77 (03-30-21 @ 09:24) (77 - 77)  BP: 172/79 (03-30-21 @ 09:24) (172/79 - 172/79)  BP(mean): --  RR: 17 (03-30-21 @ 09:24) (17 - 17)  SpO2: 96% (03-30-21 @ 09:24) (96% - 96%)  AVSS    General: Pt Alert and oriented, NAD  DSG C/D/I  Pulses: +2DP, WWP feet  Sensation: SILT BLE  Motor: 5/5 EHL/FHL/TA/GS                          10.3   10.68 )-----------( 317      ( 30 Mar 2021 08:02 )             33.6     03-30    140  |  104  |  10  ----------------------------<  132<H>  3.8   |  24  |  0.82    Ca    9.9      30 Mar 2021 11:03        A/P: 75yFemale POD#1 s/p right knee polyexchange  - VSS, Labs WnL  - Pain Control  - DVT ppx: ASA  - PT, WBS: WBAT  - minimize narcotics for post-op confusion; takes tramadol at home, oxy 10 decreased to 5mg for severe pain and tramadol 50 prn moderate pain; anti-inflammatories added to regimen  - home meds accidentally d/c'd for patient transfer from PACU to regional floor, reordered all home meds  - appreciate medicine recs  - bowel regimen  - OOB for meals, I/S  - dispo: home with HPT pending PT clearance    Ortho Pager [phone]
Ortho Post Op Check    Procedure: Right knee poly exchange   Surgeon: Dr. Quesada    Pt comfortable without complaints, pain controlled  Denies CP, SOB, N/V, numbness/tingling     Vital Signs Last 24 Hrs  T(C): 36 (03-29-21 @ 13:23), Max: 36 (03-29-21 @ 13:23)  T(F): 96.8 (03-29-21 @ 13:23), Max: 96.8 (03-29-21 @ 13:23)  HR: 61 (03-29-21 @ 15:23) (54 - 70)  BP: 162/72 (03-29-21 @ 15:23) (145/70 - 187/78)  BP(mean): 104 (03-29-21 @ 15:23) (95 - 113)  RR: 30 (03-29-21 @ 15:23) (12 - 30)  SpO2: 100% (03-29-21 @ 15:23) (99% - 100%)  AVSS    General: Pt Alert and oriented, NAD  DSG C/D/I R knee   Pulses: 2+ DP BLE   Sensation: SILT BLE   Motor: EHL/FHL/TA/GS 5/5 BLE     Post-op X-Ray: right knee AP and lateral demonstrates hardware in place     A/P: 75yFemale POD#0 s/p R knee poly exchange   - Stable  - Pain Control  - DVT ppx: ASA   - Post op abx: Ancef   - PT, WBS: WBAT    Ortho Pager [phone]
Ortho Note    Procedure: Right knee poly exchange   Surgeon: Dr. Dipak callahan however not oriented this AM.   unable to answer where she is and what surgery she/reasoning.   asking for her son.   Denies CP, SOB, N/V, numbness/tingling     Vital Signs Last 24 Hrs  T(C): 36 (03-29-21 @ 13:23), Max: 36 (03-29-21 @ 13:23)  T(F): 96.8 (03-29-21 @ 13:23), Max: 96.8 (03-29-21 @ 13:23)  HR: 61 (03-29-21 @ 15:23) (54 - 70)  BP: 162/72 (03-29-21 @ 15:23) (145/70 - 187/78)  BP(mean): 104 (03-29-21 @ 15:23) (95 - 113)  RR: 30 (03-29-21 @ 15:23) (12 - 30)  SpO2: 100% (03-29-21 @ 15:23) (99% - 100%)      General: Pt Alert and oriented, NAD  DSG C/D/I R knee   Pulses: 2+ DP BLE   Sensation: SILT BLE   Motor: EHL/FHL/TA/GS 5/5 BLE         A/P: 75yFemale POD#1 s/p R knee poly exchange   - Stable  - Pain Control  - DVT ppx: ASA   - Post op abx: Ancef   - PT, WBS: WBAT    Ortho Pager [phone]

## 2021-03-30 NOTE — DISCHARGE NOTE PROVIDER - NSDCFUADDINST_GEN_ALL_CORE_FT
Weight bear as tolerated with assistive device.  No strenuous activity, heavy lifting, driving or returning to work until cleared by MD.  You may shower - dressing is water-resistant, no soaking in bathtubs.  Keep dressing on until your follow-up appointment with Dr. Quesada  Try to have regular bowel movements, take stool softener or laxative if necessary.    Swelling may travel all the way down leg to foot, this is normal and will subside in a few weeks.  Call to schedule an appt with Dr. Quesada for follow up, if you have staples or sutures they will be removed in office.  Contact your doctor if you experience: fever greater than 101.5, chills, chest pain, difficulty breathing, redness or excessive drainage around the incision, other concerns.  Follow up with your primary care provider.   Take tylenol and meloxicam to manage your pain. Only take oxycodone 5mg AS NEEDED for pain not controlled by tylenol and meloxicam. You tolerated oxycodone well in the hospital. If at home you prefer, you can take your home dose of tramadol instead, but do not take both at the same time.    Weight bear as tolerated with assistive device.  No strenuous activity, heavy lifting, driving or returning to work until cleared by MD.  You may shower - dressing is water-resistant, no soaking in bathtubs.  Keep dressing on until your follow-up appointment with Dr. Quesada  Try to have regular bowel movements, take stool softener or laxative if necessary.    Swelling may travel all the way down leg to foot, this is normal and will subside in a few weeks.  Call to schedule an appt with Dr. Quesada for follow up, if you have staples or sutures they will be removed in office.  Contact your doctor if you experience: fever greater than 101.5, chills, chest pain, difficulty breathing, redness or excessive drainage around the incision, other concerns.  Follow up with your primary care provider.

## 2021-03-30 NOTE — CONSULT NOTE ADULT - ASSESSMENT
75 year old F w/ HTN, GERD, AS, s/p elective R knee liner exchange, medicine following for co-management.     #Post-op state: c/w pain control, c/w bowel regimen, c/w IS, DVT ppx ASA BID  #Leukocytosis: reactive to surgery  #Anemia: acute blood loss from surgery, trend H/H  #HTN: c/w atenolol, c/w losartan  #GERD: c/w PPI  #HLD: c/w statin  #AS: not overloaded, d/c IVF, maintain euvolemia, c/w lasix  #Depression: c/w zoloft

## 2021-03-30 NOTE — DISCHARGE NOTE PROVIDER - CARE PROVIDER_API CALL
Dylan Quesada)  Orthopaedic Surgery  [address]  [place], [place] [address]  Phone: [phone]  Fax: [phone]  Follow Up Time: 2 weeks   None

## 2021-03-30 NOTE — CONSULT NOTE ADULT - SUBJECTIVE AND OBJECTIVE BOX
Patient is a 75y old  Female who presents with a chief complaint of     HPI: 75F with right knee pain x 2-3 months after a fall at home. Pt reports she had primary right TKA 15 years ago and has had no issues up until this point. She ambulates with the assistance of a cane and leaning on a chair at baseline secondary to pain. Denies numbness/tingling/paresthesias to BLE.   Presents for elective right knee revision polyexchange     Seen POD 1. Was confused in the AM per HS however was awake, alert and oriented during my exam. C/o of pain that has improved since yesterday. Denies any other pain or SOB. Asking for her son.       REVIEW OF SYSTEMS otherwise negative      General:	    Skin/Breast:  	  Ophthalmologic:  	  ENMT:	    Respiratory and Thorax:  	  Cardiovascular:	    Gastrointestinal:	    Genitourinary:	    Musculoskeletal:	    Neurological:	    Psychiatric:	    Hematology/Lymphatics:	    Endocrine:	    Allergic/Immunologic:	    Allergies    No Known Allergies    Intolerances        Home Medications:  aspirin 81 mg oral tablet: orally once a day (29 Mar 2021 09:10)  atenolol 25 mg oral tablet: 1 tab(s) orally once a day (at bedtime) (29 Mar 2021 09:10)  furosemide 20 mg oral tablet: 1 tab(s) orally once a day (29 Mar 2021 09:10)  gabapentin 300 mg oral capsule: 1 cap(s) orally 2 times a day (29 Mar 2021 09:10)  losartan 100 mg oral tablet: 1 tab(s) orally once a day (29 Mar 2021 09:10)  meclizine 25 mg oral tablet:  (29 Mar 2021 09:10)  meloxicam 7.5 mg oral tablet: 1 tab(s) orally once a day (29 Mar 2021 09:10)  pantoprazole 40 mg oral delayed release tablet: 1 tab(s) orally once a day (29 Mar 2021 09:10)  sertraline 50 mg oral tablet: 1 tab(s) orally once a day (29 Mar 2021 09:10)  simvastatin 10 mg oral tablet: 1 tab(s) orally once a day (at bedtime) (29 Mar 2021 09:10)  traMADol 50 mg oral tablet: 1 tab(s) orally every 6 hours, As Needed (29 Mar 2021 09:10)  Vitamin D2: 10,000 IU take as instructed by your prescribing provider (29 Mar 2021 09:10)      PAST MEDICAL & SURGICAL HISTORY:  HTN (hypertension)    OA (osteoarthritis)    Spondylisthesis    Aortic valve disease  sclerotic aortic valve    History of right hip replacement    History of total right knee replacement    History of left knee replacement    History of left hip replacement    Surgery, elective  left shoulder replacement        FAMILY HISTORY: n/a      SOCIAL HISTORY: no alcohol, occasional cigarettes      Vital Signs Last 24 Hrs  T(C): 36.7 (30 Mar 2021 09:24), Max: 36.7 (29 Mar 2021 20:15)  T(F): 98 (30 Mar 2021 09:24), Max: 98.1 (30 Mar 2021 01:40)  HR: 77 (30 Mar 2021 09:24) (54 - 112)  BP: 172/79 (30 Mar 2021 09:24) (111/62 - 187/78)  BP(mean): 103 (29 Mar 2021 16:13) (103 - 115)  RR: 17 (30 Mar 2021 09:24) (14 - 30)  SpO2: 96% (30 Mar 2021 09:24) (95% - 100%)   I&O's Detail    29 Mar 2021 07:01  -  30 Mar 2021 07:00  --------------------------------------------------------  IN:    Lactated Ringers: 1650 mL    Oral Fluid: 480 mL  Total IN: 2130 mL    OUT:    Voided (mL): 1400 mL  Total OUT: 1400 mL    Total NET: 730 mL      30 Mar 2021 07:01  -  30 Mar 2021 14:41  --------------------------------------------------------  IN:    Oral Fluid: 350 mL  Total IN: 350 mL    OUT:    Voided (mL): 1050 mL  Total OUT: 1050 mL    Total NET: -700 mL        Daily     Daily     Physical Exam:   GEN: NAD, AAOx3, obese, pleasant, alert  HEENT: MMM, no icterus  CV: S1 S2 RRR, no MRG  Lung: CTAB  Abd: soft NT ND +BS  Ext: R knee dressing c/d/i  Neuro: no focal neuro deficit    MEDICATIONS  (STANDING):  acetaminophen   Tablet .. 650 milliGRAM(s) Oral every 6 hours  aspirin enteric coated 81 milliGRAM(s) Oral two times a day  ATENolol  Tablet 25 milliGRAM(s) Oral at bedtime  furosemide    Tablet 20 milliGRAM(s) Oral daily  gabapentin 300 milliGRAM(s) Oral two times a day  HYDROmorphone  Injectable 0.5 milliGRAM(s) IV Push every 15 minutes  ketorolac   Injectable 15 milliGRAM(s) IV Push every 6 hours  lactated ringers. 1000 milliLiter(s) (150 mL/Hr) IV Continuous <Continuous>  losartan 100 milliGRAM(s) Oral daily  polyethylene glycol 3350 17 Gram(s) Oral daily  senna 2 Tablet(s) Oral at bedtime  sertraline 50 milliGRAM(s) Oral daily  simvastatin 10 milliGRAM(s) Oral at bedtime    MEDICATIONS  (PRN):  bisacodyl Suppository 10 milliGRAM(s) Rectal daily PRN Constipation  HYDROmorphone  Injectable 0.5 milliGRAM(s) IV Push every 4 hours PRN severe breakthrough pain  magnesium hydroxide Suspension 30 milliLiter(s) Oral daily PRN Constipation  meclizine 25 milliGRAM(s) Oral daily PRN Dizziness  ondansetron Injectable 4 milliGRAM(s) IV Push every 6 hours PRN Nausea and/or Vomiting  oxyCODONE    IR 5 milliGRAM(s) Oral every 4 hours PRN Severe Pain (7 - 10)  traMADol 50 milliGRAM(s) Oral every 6 hours PRN Moderate Pain (4 - 6)                LABS:                        10.3   10.68 )-----------( 317      ( 30 Mar 2021 08:02 )             33.6     03-30    140  |  104  |  10  ----------------------------<  132<H>  3.8   |  24  |  0.82    Ca    9.9      30 Mar 2021 11:03            CAPILLARY BLOOD GLUCOSE          RADIOLOGY & ADDITIONAL STUDIES:

## 2021-03-30 NOTE — DISCHARGE NOTE PROVIDER - NSDCCPTREATMENT_GEN_ALL_CORE_FT
PRINCIPAL PROCEDURE  Procedure: Replacement of polyethylene liner of right total knee arthroplasty  Findings and Treatment:       SECONDARY PROCEDURE  Procedure: Replacement of right knee joint with synthetic substitute, uncemented, open approach  Findings and Treatment:

## 2021-03-30 NOTE — DISCHARGE NOTE NURSING/CASE MANAGEMENT/SOCIAL WORK - PATIENT PORTAL LINK FT
You can access the FollowMyHealth Patient Portal offered by [facility] by registering at the following website: [email or web address]. By joining [facility]’s FollowMyHealth portal, you will also be able to view your health information using other applications (apps) compatible with our system.

## 2021-03-30 NOTE — DISCHARGE NOTE PROVIDER - NSFTFHOMEHTHYNRD_GEN_ALL_CORE
Patient information on fall and injury prevention Patient information on fall and injury prevention Yes

## 2021-03-30 NOTE — DISCHARGE NOTE PROVIDER - HOSPITAL COURSE
Admitted 3/29/21  Surgery -right knee polyexchange  Doris-op Antibiotics  Pain control  DVT prophylaxis  OOB/Physical Therapy

## 2021-04-14 DIAGNOSIS — F17.200 NICOTINE DEPENDENCE, UNSPECIFIED, UNCOMPLICATED: ICD-10-CM

## 2021-04-14 DIAGNOSIS — D62 ACUTE POSTHEMORRHAGIC ANEMIA: ICD-10-CM

## 2021-04-14 DIAGNOSIS — T84.022A INSTABILITY OF INTERNAL RIGHT KNEE PROSTHESIS, INITIAL ENCOUNTER: ICD-10-CM

## 2021-04-14 DIAGNOSIS — F32.9 MAJOR DEPRESSIVE DISORDER, SINGLE EPISODE, UNSPECIFIED: ICD-10-CM

## 2021-04-14 DIAGNOSIS — I10 ESSENTIAL (PRIMARY) HYPERTENSION: ICD-10-CM

## 2021-04-14 DIAGNOSIS — K21.9 GASTRO-ESOPHAGEAL REFLUX DISEASE WITHOUT ESOPHAGITIS: ICD-10-CM
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
## 2021-04-14 DIAGNOSIS — E78.5 HYPERLIPIDEMIA, UNSPECIFIED: ICD-10-CM

## 2021-04-14 DIAGNOSIS — Z96.651 PRESENCE OF RIGHT ARTIFICIAL KNEE JOINT: ICD-10-CM

## 2021-04-14 DIAGNOSIS — E66.9 OBESITY, UNSPECIFIED: ICD-10-CM

## 2021-04-16 ENCOUNTER — APPOINTMENT (OUTPATIENT)
Dept: ORTHOPEDIC SURGERY | Facility: CLINIC | Age: 76
End: 2021-04-16
Payer: MEDICARE

## 2021-04-16 PROCEDURE — 99024 POSTOP FOLLOW-UP VISIT: CPT

## 2021-04-20 NOTE — ASSESSMENT
[FreeTextEntry1] : Not in acute distress, dressed appropriately, sitting on examination table \par Skin: Warm and dry, normal turgor, no rashes \par Neurological: AOx3, Cranial nerves grossly in tact \par Psych: Mood and affect appropriate \par Right Knee: Well healed anteriorly midline surgical incision closed with staples. No swelling edema erythema redness or drainage. tender anteriorly. Netural alignement. ROM: Not assessed. Stable. 5/5 Strength. DNVI. Ambulates without devices.\par \par

## 2021-04-20 NOTE — HISTORY OF PRESENT ILLNESS
[de-identified] : 58 yo female s/p right poly exchange revision on 03/29/21 and doing great. She reports ambulation/mobility is great and pain is managed with pain meds. She reports no other complaints at this time.\par

## 2021-04-20 NOTE — PHYSICAL EXAM
[de-identified] : Not in acute distress, dressed appropriately, sitting on examination table \par Skin: Warm and dry, normal turgor, no rashes \par Neurological: AOx3, Cranial nerves grossly in tact \par Psych: Mood and affect appropriate \par Right Knee: Well healed anteriorly midline surgical incision closed with staples. No swelling edema erythema redness or drainage. tender anteriorly. Netural alignement. ROM: Not assessed. Stable. 5/5 Strength. DNVI. Ambulates without devices.\par \par  [de-identified] : Xrays shows right total knee arthroplasty well fixed and in place without abnormalities

## 2021-04-26 ENCOUNTER — TRANSCRIPTION ENCOUNTER (OUTPATIENT)
Age: 76
End: 2021-04-26

## 2021-05-25 ENCOUNTER — TRANSCRIPTION ENCOUNTER (OUTPATIENT)
Age: 76
End: 2021-05-25

## 2021-05-28 ENCOUNTER — APPOINTMENT (OUTPATIENT)
Dept: ORTHOPEDIC SURGERY | Facility: CLINIC | Age: 76
End: 2021-05-28
Payer: MEDICARE

## 2021-05-28 DIAGNOSIS — Z96.651 AFTERCARE FOLLOWING JOINT REPLACEMENT SURGERY: ICD-10-CM

## 2021-05-28 DIAGNOSIS — Z47.1 AFTERCARE FOLLOWING JOINT REPLACEMENT SURGERY: ICD-10-CM

## 2021-05-28 PROCEDURE — 99024 POSTOP FOLLOW-UP VISIT: CPT

## 2021-05-28 NOTE — PHYSICAL EXAM
[de-identified] : Not in acute distress, dressed appropriately, sitting on examination table \par Skin: Warm and dry, normal turgor, no rashes \par Neurological: AOx3, Cranial nerves grossly in tact \par Psych: Mood and affect appropriate \par Focused exam of the Right Knee: No swelling edema erythema redness or drainage. Diffusely-tender to palpation. Non-tender w/ MCL & LCL examination. Alignment: Neutral. ROM: 0-120. Stable. 5/5 Strength. DNVI. Ambulates without devices.\par \par \par \par  [de-identified] : none today

## 2021-05-28 NOTE — HISTORY OF PRESENT ILLNESS
[de-identified] : 58 yo female s/p right poly exchange revision on 03/29/21 and ambulation, mobility is difficuly. Pain is not managed with pain meds and not progressing in PT. She reports no other complaints at this time.

## 2021-06-01 PROBLEM — Z47.1 AFTERCARE FOLLOWING RIGHT KNEE JOINT REPLACEMENT SURGERY: Status: ACTIVE | Noted: 2020-10-21

## 2021-06-02 ENCOUNTER — RX RENEWAL (OUTPATIENT)
Age: 76
End: 2021-06-02

## 2021-06-09 ENCOUNTER — APPOINTMENT (OUTPATIENT)
Dept: ORTHOPEDIC SURGERY | Facility: CLINIC | Age: 76
End: 2021-06-09
Payer: MEDICARE

## 2021-06-09 VITALS
TEMPERATURE: 98.4 F | HEIGHT: 65 IN | WEIGHT: 185 LBS | HEART RATE: 60 BPM | OXYGEN SATURATION: 98 % | BODY MASS INDEX: 30.82 KG/M2

## 2021-06-09 PROCEDURE — ZZZZZ: CPT

## 2021-06-09 NOTE — HISTORY OF PRESENT ILLNESS
[___ Weeks Post Op] : [unfilled] weeks post op [10] : the patient reports pain that is 10/10 in severity [Chills] : no chills [Constipation] : no constipation [Diarrhea] : no diarrhea [Dysuria] : no dysuria [Fever] : no fever [Nausea] : no nausea [Vomiting] : no vomiting [de-identified] : Patient complains of pain elicited upon weightbearing , lying down and walking.\par She states that the surgical site ( RIght knee ) feels warm to touch. \par Conservatively she has tried pain medication such as oxycodone. \par She is currently attending physical therapy her last session  was on Monday 06/07/2021.

## 2021-06-15 ENCOUNTER — RX RENEWAL (OUTPATIENT)
Age: 76
End: 2021-06-15

## 2021-07-23 ENCOUNTER — RX RENEWAL (OUTPATIENT)
Age: 76
End: 2021-07-23

## 2021-10-18 ENCOUNTER — TRANSCRIPTION ENCOUNTER (OUTPATIENT)
Age: 76
End: 2021-10-18

## 2021-11-11 ENCOUNTER — TRANSCRIPTION ENCOUNTER (OUTPATIENT)
Age: 76
End: 2021-11-11

## 2022-01-01 NOTE — PHYSICAL THERAPY INITIAL EVALUATION ADULT - FUNCTIONAL LIMITATIONS, PT EVAL
Interval HPI / Overnight events:   Male Single liveborn, born in hospital, delivered by  delivery     born at 40 weeks gestation, now 1d old.  No acute events overnight.     Feeding / voiding/ stooling appropriately    Physical Exam:   Current Weight: Daily Height/Length in cm: 53.5 (2022 17:32)    Daily Weight Gm: 3530 (2022 12:48)  Percent Change From Birth: -5%    Vitals stable  Physical Exam:    Gen: awake, alert, active  HEENT: anterior fontanel open soft and flat. no cleft lip/palate, ears normal set, no ear pits or tags, no lesions in mouth/throat,  red reflex positive bilaterally, nares clinically patent, +ankyloglossia  Resp: good air entry and clear to auscultation bilaterally  Cardiac: Normal S1/S2, regular rate and rhythm, no murmurs, rubs or gallops, 2+ femoral pulses bilaterally  Abd: soft, non tender, non distended, normal bowel sounds, no organomegaly,  umbilicus clean/dry/intact  Neuro: +grasp/suck/enoch, normal tone  Extremities: negative bartlow and ortolani, full range of motion x 4, no crepitus  Skin: no rash, pink  Genital Exam: testes descended bilaterally, +b/l hydrocele, normal male anatomy, chanel 1, anus patent    Cleared for Circumcision (Male Infants) [x ] Yes [ ] No  Circumcision Completed [ ] Yes [ x] No    Laboratory & Imaging Studies:       If applicable, Bili 5.3 at 24 hours of life.   Risk zone: low intermediate    Blood culture results:   Other:   [x ] Diagnostic testing not indicated for today's encounter    Assessment and Plan of Care:     [x ] Normal / Healthy [place]  [ ] GBS Protocol  [ ] Hypoglycemia Protocol for SGA / LGA / IDM / Premature Infant  [ x] Other: ENT c/s for tongue tie    Family Discussion:   [x ]Feeding and baby weight loss were discussed today. Parent questions were answered  [ ]Other items discussed:   [ ]Unable to speak with family today due to maternal condition    Char Hanna MD  Pediatric Hospitalist  office: [phone]  pager: [phone] 
self-care/home management/community/leisure

## 2022-01-05 ENCOUNTER — APPOINTMENT (OUTPATIENT)
Dept: HEART AND VASCULAR | Facility: CLINIC | Age: 77
End: 2022-01-05

## 2022-01-10 ENCOUNTER — APPOINTMENT (OUTPATIENT)
Dept: HEART AND VASCULAR | Facility: CLINIC | Age: 77
End: 2022-01-10
Payer: MEDICARE

## 2022-01-10 VITALS
WEIGHT: 178 LBS | HEIGHT: 65 IN | SYSTOLIC BLOOD PRESSURE: 130 MMHG | DIASTOLIC BLOOD PRESSURE: 75 MMHG | HEART RATE: 78 BPM | BODY MASS INDEX: 29.66 KG/M2 | OXYGEN SATURATION: 98 % | TEMPERATURE: 97.2 F

## 2022-01-10 DIAGNOSIS — I35.9 NONRHEUMATIC AORTIC VALVE DISORDER, UNSPECIFIED: ICD-10-CM

## 2022-01-10 DIAGNOSIS — G89.29 PAIN IN THORACIC SPINE: ICD-10-CM

## 2022-01-10 DIAGNOSIS — M54.6 PAIN IN THORACIC SPINE: ICD-10-CM

## 2022-01-10 PROCEDURE — 99214 OFFICE O/P EST MOD 30 MIN: CPT

## 2022-01-10 PROCEDURE — 36415 COLL VENOUS BLD VENIPUNCTURE: CPT

## 2022-01-10 RX ORDER — OXYCODONE 5 MG/1
5 TABLET ORAL
Qty: 40 | Refills: 0 | Status: DISCONTINUED | COMMUNITY
Start: 2021-05-28 | End: 2022-01-10

## 2022-01-10 RX ORDER — MELOXICAM 7.5 MG/1
7.5 TABLET ORAL
Qty: 90 | Refills: 0 | Status: DISCONTINUED | COMMUNITY
Start: 2020-09-30 | End: 2022-01-10

## 2022-01-10 RX ORDER — OXYCODONE 5 MG/1
5 TABLET ORAL
Qty: 30 | Refills: 0 | Status: DISCONTINUED | COMMUNITY
Start: 2021-05-12 | End: 2022-01-10

## 2022-01-10 RX ORDER — OXYCODONE 5 MG/1
5 TABLET ORAL
Qty: 40 | Refills: 0 | Status: DISCONTINUED | COMMUNITY
Start: 2021-04-07 | End: 2022-01-10

## 2022-01-10 NOTE — REASON FOR VISIT
[Structural Heart and Valve Disease] : structural heart and valve disease [Hyperlipidemia] : hyperlipidemia [Hypertension] : hypertension [Other: ____] : [unfilled] [Formal Caregiver] : formal caregiver [Family Member] : family member

## 2022-01-10 NOTE — DISCUSSION/SUMMARY
[FreeTextEntry1] : stable exam, labs in office\par HTN stable on meds\par Lipids cont statin\par AV disease stable\par low back pain- tramadol prn, refusing PT

## 2022-01-10 NOTE — PHYSICAL EXAM
[Well Developed] : well developed [Well Nourished] : well nourished [No Acute Distress] : no acute distress [Normal Conjunctiva] : normal conjunctiva [Normal Venous Pressure] : normal venous pressure [No Carotid Bruit] : no carotid bruit [Normal S1, S2] : normal S1, S2 [No Murmur] : no murmur [No Rub] : no rub [No Gallop] : no gallop [Clear Lung Fields] : clear lung fields [Good Air Entry] : good air entry [No Respiratory Distress] : no respiratory distress  [Soft] : abdomen soft [Non Tender] : non-tender [No Masses/organomegaly] : no masses/organomegaly [Normal Bowel Sounds] : normal bowel sounds [No Edema] : no edema [No Cyanosis] : no cyanosis [No Clubbing] : no clubbing [No Rash] : no rash [Moves all extremities] : moves all extremities [No Focal Deficits] : no focal deficits [Normal Speech] : normal speech [Alert and Oriented] : alert and oriented [Normal memory] : normal memory [de-identified] : wheelchair/ limited gait

## 2022-01-10 NOTE — HISTORY OF PRESENT ILLNESS
[FreeTextEntry1] : refusing PT\par back pain persist some help with tramadol\par HTN bp controllled\par Lipids- on statin\par AV disease echo reviewed

## 2022-01-11 LAB
ALBUMIN SERPL ELPH-MCNC: 4.3 G/DL
ALP BLD-CCNC: 118 U/L
ALT SERPL-CCNC: 11 U/L
ANION GAP SERPL CALC-SCNC: 12 MMOL/L
AST SERPL-CCNC: 22 U/L
BASOPHILS # BLD AUTO: 0.11 K/UL
BASOPHILS NFR BLD AUTO: 2.2 %
BILIRUB SERPL-MCNC: 0.3 MG/DL
BUN SERPL-MCNC: 11 MG/DL
CALCIUM SERPL-MCNC: 10.1 MG/DL
CHLORIDE SERPL-SCNC: 104 MMOL/L
CHOLEST SERPL-MCNC: 202 MG/DL
CO2 SERPL-SCNC: 24 MMOL/L
CREAT SERPL-MCNC: 0.83 MG/DL
EOSINOPHIL # BLD AUTO: 0.17 K/UL
EOSINOPHIL NFR BLD AUTO: 3.3 %
ESTIMATED AVERAGE GLUCOSE: 131 MG/DL
GLUCOSE SERPL-MCNC: 90 MG/DL
HBA1C MFR BLD HPLC: 6.2 %
HCT VFR BLD CALC: 38.2 %
HDLC SERPL-MCNC: 42 MG/DL
HGB BLD-MCNC: 11.4 G/DL
IMM GRANULOCYTES NFR BLD AUTO: 0.2 %
LDLC SERPL CALC-MCNC: 122 MG/DL
LYMPHOCYTES # BLD AUTO: 1.23 K/UL
LYMPHOCYTES NFR BLD AUTO: 24.2 %
MAN DIFF?: NORMAL
MCHC RBC-ENTMCNC: 25.6 PG
MCHC RBC-ENTMCNC: 29.8 GM/DL
MCV RBC AUTO: 85.8 FL
MONOCYTES # BLD AUTO: 0.5 K/UL
MONOCYTES NFR BLD AUTO: 9.8 %
NEUTROPHILS # BLD AUTO: 3.07 K/UL
NEUTROPHILS NFR BLD AUTO: 60.3 %
NONHDLC SERPL-MCNC: 159 MG/DL
PLATELET # BLD AUTO: 361 K/UL
POTASSIUM SERPL-SCNC: 4.4 MMOL/L
PROT SERPL-MCNC: 7 G/DL
RBC # BLD: 4.45 M/UL
RBC # FLD: 15.4 %
SODIUM SERPL-SCNC: 141 MMOL/L
TRIGL SERPL-MCNC: 184 MG/DL
TSH SERPL-ACNC: 1.9 UIU/ML
WBC # FLD AUTO: 5.09 K/UL

## 2022-02-02 ENCOUNTER — TRANSCRIPTION ENCOUNTER (OUTPATIENT)
Age: 77
End: 2022-02-02

## 2022-02-03 ENCOUNTER — TRANSCRIPTION ENCOUNTER (OUTPATIENT)
Age: 77
End: 2022-02-03

## 2022-02-03 DIAGNOSIS — M19.90 UNSPECIFIED OSTEOARTHRITIS, UNSPECIFIED SITE: ICD-10-CM

## 2022-02-03 NOTE — ASU PREOP CHECKLIST - TEMPERATURE IN CELSIUS (DEGREES C)
TODAY'S VISIT:  You were seen today for ankle fracture  -   - If you had any labs or imaging/radiology tests performed today, you should also discuss these tests with your usual provider.     FOLLOW-UP:  Please make an appointment to follow up with:  - Your Primary Care Provider. If you do not have a PCP, please call the Primary Care Center (phone: [phone] for an appointment  -  orthopedics as scheduled    - Have your provider review the results from today's visit with you again to make sure no further follow-up or additional testing is needed based on those results.     PRESCRIPTIONS / MEDICATIONS:  - Norco as needed for pain  - zofran as needed for nausea  - benadryl as needed for itching    OTHER INSTRUCTIONS:  - keep your foot elevated as much as possible    RETURN TO THE EMERGENCY DEPARTMENT  Return to the Emergency Department at any time for any new or worsening symptoms or any concerns.    
36.6

## 2022-02-07 ENCOUNTER — RX RENEWAL (OUTPATIENT)
Age: 77
End: 2022-02-07

## 2022-03-10 ENCOUNTER — RX RENEWAL (OUTPATIENT)
Age: 77
End: 2022-03-10

## 2022-03-21 ENCOUNTER — TRANSCRIPTION ENCOUNTER (OUTPATIENT)
Age: 77
End: 2022-03-21

## 2022-04-25 ENCOUNTER — TRANSCRIPTION ENCOUNTER (OUTPATIENT)
Age: 77
End: 2022-04-25

## 2022-05-13 ENCOUNTER — RX RENEWAL (OUTPATIENT)
Age: 77
End: 2022-05-13

## 2022-05-24 ENCOUNTER — RX RENEWAL (OUTPATIENT)
Age: 77
End: 2022-05-24

## 2022-06-29 ENCOUNTER — RX RENEWAL (OUTPATIENT)
Age: 77
End: 2022-06-29

## 2022-07-20 ENCOUNTER — RX RENEWAL (OUTPATIENT)
Age: 77
End: 2022-07-20

## 2022-07-21 ENCOUNTER — TRANSCRIPTION ENCOUNTER (OUTPATIENT)
Age: 77
End: 2022-07-21

## 2022-08-04 DIAGNOSIS — Z00.00 ENCOUNTER FOR GENERAL ADULT MEDICAL EXAMINATION W/OUT ABNORMAL FINDINGS: ICD-10-CM

## 2022-08-29 ENCOUNTER — NON-APPOINTMENT (OUTPATIENT)
Age: 77
End: 2022-08-29

## 2022-08-29 ENCOUNTER — APPOINTMENT (OUTPATIENT)
Dept: HEART AND VASCULAR | Facility: CLINIC | Age: 77
End: 2022-08-29

## 2022-08-29 VITALS
BODY MASS INDEX: 30.5 KG/M2 | SYSTOLIC BLOOD PRESSURE: 164 MMHG | HEART RATE: 62 BPM | OXYGEN SATURATION: 95 % | WEIGHT: 183.05 LBS | RESPIRATION RATE: 16 BRPM | HEIGHT: 65 IN | TEMPERATURE: 98.1 F | DIASTOLIC BLOOD PRESSURE: 90 MMHG

## 2022-08-29 DIAGNOSIS — M47.817 SPONDYLOSIS W/OUT MYELOPATHY OR RADICULOPATHY, LUMBOSACRAL REGION: ICD-10-CM

## 2022-08-29 PROCEDURE — 36415 COLL VENOUS BLD VENIPUNCTURE: CPT

## 2022-08-29 PROCEDURE — 99214 OFFICE O/P EST MOD 30 MIN: CPT

## 2022-08-29 RX ORDER — MELOXICAM 7.5 MG/1
7.5 TABLET ORAL
Qty: 90 | Refills: 0 | Status: DISCONTINUED | COMMUNITY
Start: 2022-02-03 | End: 2022-08-29

## 2022-08-29 RX ORDER — ETODOLAC 400 MG/1
400 TABLET, FILM COATED ORAL
Qty: 60 | Refills: 0 | Status: DISCONTINUED | COMMUNITY
Start: 2020-10-12 | End: 2022-08-29

## 2022-08-29 RX ORDER — TRAMADOL HYDROCHLORIDE 50 MG/1
50 TABLET, COATED ORAL TWICE DAILY
Qty: 60 | Refills: 0 | Status: DISCONTINUED | COMMUNITY
Start: 2021-03-02 | End: 2022-08-29

## 2022-08-29 NOTE — REASON FOR VISIT
[Hyperlipidemia] : hyperlipidemia [Hypertension] : hypertension [Other: ____] : [unfilled] [Formal Caregiver] : formal caregiver

## 2022-08-29 NOTE — PHYSICAL EXAM
[Well Developed] : well developed [Well Nourished] : well nourished [No Acute Distress] : no acute distress [Normal Conjunctiva] : normal conjunctiva [Normal Venous Pressure] : normal venous pressure [No Carotid Bruit] : no carotid bruit [Normal S1, S2] : normal S1, S2 [No Murmur] : no murmur [No Rub] : no rub [No Gallop] : no gallop [Clear Lung Fields] : clear lung fields [Good Air Entry] : good air entry [No Respiratory Distress] : no respiratory distress  [Soft] : abdomen soft [Non Tender] : non-tender [No Masses/organomegaly] : no masses/organomegaly [Normal Bowel Sounds] : normal bowel sounds [No Edema] : no edema [No Cyanosis] : no cyanosis [No Clubbing] : no clubbing [No Rash] : no rash [Moves all extremities] : moves all extremities [No Focal Deficits] : no focal deficits [Normal Speech] : normal speech [Alert and Oriented] : alert and oriented [Normal memory] : normal memory [de-identified] : wheelchair/ limited gait

## 2022-08-29 NOTE — DISCUSSION/SUMMARY
[FreeTextEntry1] : stable exam, labs in office\par HTN stable\par Lipids stable\par A1c diet controlled, check repeat a1c\par needs PT and OT/ \par pain management referral

## 2022-08-30 LAB
ALBUMIN SERPL ELPH-MCNC: 4.4 G/DL
ALP BLD-CCNC: 123 U/L
ALT SERPL-CCNC: 10 U/L
ANION GAP SERPL CALC-SCNC: 10 MMOL/L
AST SERPL-CCNC: 21 U/L
BASOPHILS # BLD AUTO: 0.09 K/UL
BASOPHILS NFR BLD AUTO: 1.6 %
BILIRUB SERPL-MCNC: 0.3 MG/DL
BUN SERPL-MCNC: 13 MG/DL
CALCIUM SERPL-MCNC: 10.1 MG/DL
CHLORIDE SERPL-SCNC: 105 MMOL/L
CHOLEST SERPL-MCNC: 181 MG/DL
CO2 SERPL-SCNC: 28 MMOL/L
CREAT SERPL-MCNC: 0.87 MG/DL
EGFR: 68 ML/MIN/1.73M2
EOSINOPHIL # BLD AUTO: 0.29 K/UL
EOSINOPHIL NFR BLD AUTO: 5.3 %
ESTIMATED AVERAGE GLUCOSE: 137 MG/DL
GLUCOSE SERPL-MCNC: 102 MG/DL
HBA1C MFR BLD HPLC: 6.4 %
HCT VFR BLD CALC: 36.3 %
HDLC SERPL-MCNC: 43 MG/DL
HGB BLD-MCNC: 10.6 G/DL
IMM GRANULOCYTES NFR BLD AUTO: 0.2 %
LDLC SERPL CALC-MCNC: 108 MG/DL
LYMPHOCYTES # BLD AUTO: 1.67 K/UL
LYMPHOCYTES NFR BLD AUTO: 30.3 %
MAN DIFF?: NORMAL
MCHC RBC-ENTMCNC: 25.4 PG
MCHC RBC-ENTMCNC: 29.2 GM/DL
MCV RBC AUTO: 86.8 FL
MONOCYTES # BLD AUTO: 0.45 K/UL
MONOCYTES NFR BLD AUTO: 8.2 %
NEUTROPHILS # BLD AUTO: 3 K/UL
NEUTROPHILS NFR BLD AUTO: 54.4 %
NONHDLC SERPL-MCNC: 137 MG/DL
PLATELET # BLD AUTO: 334 K/UL
POTASSIUM SERPL-SCNC: 4.7 MMOL/L
PROT SERPL-MCNC: 6.8 G/DL
RBC # BLD: 4.18 M/UL
RBC # FLD: 14.6 %
SODIUM SERPL-SCNC: 144 MMOL/L
TRIGL SERPL-MCNC: 149 MG/DL
TSH SERPL-ACNC: 1.05 UIU/ML
WBC # FLD AUTO: 5.51 K/UL

## 2022-12-05 ENCOUNTER — TRANSCRIPTION ENCOUNTER (OUTPATIENT)
Age: 77
End: 2022-12-05

## 2022-12-07 ENCOUNTER — TRANSCRIPTION ENCOUNTER (OUTPATIENT)
Age: 77
End: 2022-12-07

## 2022-12-11 DIAGNOSIS — M19.012 PRIMARY OSTEOARTHRITIS, LEFT SHOULDER: ICD-10-CM

## 2023-02-16 ENCOUNTER — APPOINTMENT (OUTPATIENT)
Dept: HEART AND VASCULAR | Facility: CLINIC | Age: 78
End: 2023-02-16
Payer: MEDICARE

## 2023-02-16 VITALS
SYSTOLIC BLOOD PRESSURE: 140 MMHG | HEART RATE: 75 BPM | OXYGEN SATURATION: 96 % | TEMPERATURE: 98.1 F | DIASTOLIC BLOOD PRESSURE: 90 MMHG

## 2023-02-16 DIAGNOSIS — M54.50 LOW BACK PAIN, UNSPECIFIED: ICD-10-CM

## 2023-02-16 DIAGNOSIS — R41.3 OTHER AMNESIA: ICD-10-CM

## 2023-02-16 PROCEDURE — 99214 OFFICE O/P EST MOD 30 MIN: CPT

## 2023-02-16 PROCEDURE — 36415 COLL VENOUS BLD VENIPUNCTURE: CPT

## 2023-02-16 RX ORDER — ONDANSETRON 4 MG/1
4 TABLET ORAL EVERY 8 HOURS
Qty: 6 | Refills: 0 | Status: DISCONTINUED | COMMUNITY
Start: 2019-06-04 | End: 2023-02-16

## 2023-02-16 RX ORDER — MELOXICAM 15 MG/1
15 TABLET ORAL
Qty: 30 | Refills: 3 | Status: DISCONTINUED | COMMUNITY
Start: 2022-12-11 | End: 2023-02-16

## 2023-02-16 NOTE — REASON FOR VISIT
[Hyperlipidemia] : hyperlipidemia [Hypertension] : hypertension [Other: ____] : [unfilled] [Family Member] : family member

## 2023-02-16 NOTE — PHYSICAL EXAM
[Well Developed] : well developed [Well Nourished] : well nourished [No Acute Distress] : no acute distress [Normal Conjunctiva] : normal conjunctiva [Normal Venous Pressure] : normal venous pressure [No Carotid Bruit] : no carotid bruit [Normal S1, S2] : normal S1, S2 [No Murmur] : no murmur [No Rub] : no rub [No Gallop] : no gallop [Clear Lung Fields] : clear lung fields [Good Air Entry] : good air entry [No Respiratory Distress] : no respiratory distress  [Soft] : abdomen soft [Non Tender] : non-tender [No Masses/organomegaly] : no masses/organomegaly [Normal Bowel Sounds] : normal bowel sounds [No Edema] : no edema [No Cyanosis] : no cyanosis [No Clubbing] : no clubbing [No Rash] : no rash [Moves all extremities] : moves all extremities [No Focal Deficits] : no focal deficits [Normal Speech] : normal speech [Alert and Oriented] : alert and oriented [de-identified] : wheelchair/ limited gait

## 2023-02-16 NOTE — DISCUSSION/SUMMARY
[FreeTextEntry1] : stable exam, labs in office\par HTN stable\par Lipids stable\par a1c stable\par memory changes- neuro eval\par back pain- pain management referral

## 2023-02-16 NOTE — HISTORY OF PRESENT ILLNESS
[FreeTextEntry1] : with son \par reports changes in memory at times\par also worsening pain \par did not see pain management\par did not do pt and ot

## 2023-02-17 LAB
ALBUMIN SERPL ELPH-MCNC: 4.4 G/DL
ALP BLD-CCNC: 118 U/L
ALT SERPL-CCNC: 17 U/L
ANION GAP SERPL CALC-SCNC: 14 MMOL/L
AST SERPL-CCNC: 20 U/L
BASOPHILS # BLD AUTO: 0.11 K/UL
BASOPHILS NFR BLD AUTO: 1.9 %
BILIRUB SERPL-MCNC: 0.2 MG/DL
BUN SERPL-MCNC: 14 MG/DL
CALCIUM SERPL-MCNC: 10.3 MG/DL
CHLORIDE SERPL-SCNC: 105 MMOL/L
CHOLEST SERPL-MCNC: 208 MG/DL
CO2 SERPL-SCNC: 23 MMOL/L
CREAT SERPL-MCNC: 0.8 MG/DL
EGFR: 75 ML/MIN/1.73M2
EOSINOPHIL # BLD AUTO: 0.27 K/UL
EOSINOPHIL NFR BLD AUTO: 4.7 %
ESTIMATED AVERAGE GLUCOSE: 146 MG/DL
GLUCOSE SERPL-MCNC: 91 MG/DL
HBA1C MFR BLD HPLC: 6.7 %
HCT VFR BLD CALC: 33.9 %
HDLC SERPL-MCNC: 45 MG/DL
HGB BLD-MCNC: 10.2 G/DL
IMM GRANULOCYTES NFR BLD AUTO: 0.3 %
LDLC SERPL CALC-MCNC: 122 MG/DL
LYMPHOCYTES # BLD AUTO: 1.88 K/UL
LYMPHOCYTES NFR BLD AUTO: 32.5 %
MAN DIFF?: NORMAL
MCHC RBC-ENTMCNC: 25.3 PG
MCHC RBC-ENTMCNC: 30.1 GM/DL
MCV RBC AUTO: 84.1 FL
MONOCYTES # BLD AUTO: 0.52 K/UL
MONOCYTES NFR BLD AUTO: 9 %
NEUTROPHILS # BLD AUTO: 2.99 K/UL
NEUTROPHILS NFR BLD AUTO: 51.6 %
NONHDLC SERPL-MCNC: 163 MG/DL
PLATELET # BLD AUTO: 338 K/UL
POTASSIUM SERPL-SCNC: 4.3 MMOL/L
PROT SERPL-MCNC: 7 G/DL
RBC # BLD: 4.03 M/UL
RBC # FLD: 15.4 %
SODIUM SERPL-SCNC: 142 MMOL/L
T PALLIDUM AB SER QL IA: NEGATIVE
TRIGL SERPL-MCNC: 205 MG/DL
TSH SERPL-ACNC: 1.11 UIU/ML
VIT B12 SERPL-MCNC: 351 PG/ML
WBC # FLD AUTO: 5.79 K/UL

## 2023-04-10 ENCOUNTER — TRANSCRIPTION ENCOUNTER (OUTPATIENT)
Age: 78
End: 2023-04-10

## 2023-04-10 RX ORDER — MECLIZINE HYDROCHLORIDE 25 MG/1
25 TABLET ORAL
Qty: 30 | Refills: 5 | Status: ACTIVE | COMMUNITY
Start: 2019-03-12 | End: 1900-01-01

## 2023-04-10 RX ORDER — FUROSEMIDE 20 MG/1
20 TABLET ORAL
Qty: 90 | Refills: 3 | Status: ACTIVE | COMMUNITY
Start: 2018-08-13 | End: 1900-01-01

## 2023-07-05 ENCOUNTER — APPOINTMENT (OUTPATIENT)
Dept: NEUROLOGY | Facility: CLINIC | Age: 78
End: 2023-07-05
Payer: MEDICARE

## 2023-07-05 VITALS
DIASTOLIC BLOOD PRESSURE: 70 MMHG | OXYGEN SATURATION: 96 % | HEART RATE: 65 BPM | TEMPERATURE: 98.2 F | SYSTOLIC BLOOD PRESSURE: 160 MMHG | HEIGHT: 64 IN

## 2023-07-05 DIAGNOSIS — Z78.9 OTHER SPECIFIED HEALTH STATUS: ICD-10-CM

## 2023-07-05 PROCEDURE — 99204 OFFICE O/P NEW MOD 45 MIN: CPT

## 2023-07-05 RX ORDER — PANTOPRAZOLE 40 MG/1
40 TABLET, DELAYED RELEASE ORAL
Qty: 90 | Refills: 1 | Status: DISCONTINUED | COMMUNITY
Start: 2021-06-15 | End: 2023-07-05

## 2023-07-05 RX ORDER — SERTRALINE HYDROCHLORIDE 50 MG/1
50 TABLET, FILM COATED ORAL
Qty: 90 | Refills: 0 | Status: DISCONTINUED | COMMUNITY
Start: 2023-06-05

## 2023-07-05 RX ORDER — ZOLPIDEM TARTRATE 5 MG/1
5 TABLET ORAL
Qty: 30 | Refills: 4 | Status: DISCONTINUED | COMMUNITY
Start: 2018-03-19 | End: 2023-07-05

## 2023-07-05 NOTE — CONSULT LETTER
[Dear  ___] : Dear  [unfilled], [Consult Letter:] : I had the pleasure of evaluating your patient, [unfilled]. [Please see my note below.] : Please see my note below. [Consult Closing:] : Thank you very much for allowing me to participate in the care of this patient.  If you have any questions, please do not hesitate to contact me. [Sincerely,] : Sincerely, [FreeTextEntry2] : Lauri Seymour MD [FreeTextEntry3] : Ronel Sutton MD

## 2023-07-05 NOTE — PHYSICAL EXAM
[FreeTextEntry1] : General: no apparent distress\par Mental Status: awake and alert, oriented to name, doctor's office, says the year is 2003, follows simple but not complex commands, denies visual or auditory hallucinations\par Cranial Nerves: tracks in all directions, face symmetric, no dysarthria\par Motor: no abnormal movements, no orbiting or pronator drift, no tremor, rigidity, or bradykinesia\par Coordination: no dysmetria on finger-nose-finger testing\par Gait: wheelchair 2/2 bilateral knee pain and leg weakness\par

## 2023-07-05 NOTE — ASSESSMENT
[FreeTextEntry1] : 78-year-old woman with dementia with severe behavioral disturbance (paranoid delusions and visual hallucinations).  I suspect this is Alzheimer's disease as vascular dementia does not typically have such prominent psychiatric symptoms and she has no motor dysfunction on exam to suggest Lewy body dementia.  \par Will obtain MRI brain to assess degree of vascular disease and exclude underlying structural lesion.  Start seroquel 25 mg BID for delusions and hallucinations.  I agree that for her safety, she should not be at home unsupervised.\par \par RTC 3 months\par

## 2023-07-05 NOTE — HISTORY OF PRESENT ILLNESS
[FreeTextEntry1] : Presents with son for evaluation of possible dementia.\par \par Patient's son reports that she has been living with him and his  for the past 8 years.  About a year ago she started to have frequent nightmares.  This was followed by increasingly frequent delusions and hallucinations -- seeing water dripping down the walls when it is not, seeing clowns and other people in the house who are not actually there, thinking that her son-in-law is having an affair and brining women into the house, thinking that her son and son-in law are causing her to have these disturbing thoughts.  Recently tried to throw the TV out the window because she was afraid of it.  Son is afraid to leave her at home alone.

## 2023-07-17 ENCOUNTER — APPOINTMENT (OUTPATIENT)
Age: 78
End: 2023-07-17

## 2023-07-24 ENCOUNTER — OUTPATIENT (OUTPATIENT)
Dept: OUTPATIENT SERVICES | Facility: HOSPITAL | Age: 78
LOS: 1 days | End: 2023-07-24

## 2023-07-24 ENCOUNTER — APPOINTMENT (OUTPATIENT)
Dept: MRI IMAGING | Facility: CLINIC | Age: 78
End: 2023-07-24
Payer: MEDICARE

## 2023-07-24 DIAGNOSIS — Z96.642 PRESENCE OF LEFT ARTIFICIAL HIP JOINT: Chronic | ICD-10-CM

## 2023-07-24 DIAGNOSIS — Z96.651 PRESENCE OF RIGHT ARTIFICIAL KNEE JOINT: Chronic | ICD-10-CM

## 2023-07-24 DIAGNOSIS — Z41.9 ENCOUNTER FOR PROCEDURE FOR PURPOSES OTHER THAN REMEDYING HEALTH STATE, UNSPECIFIED: Chronic | ICD-10-CM

## 2023-07-24 DIAGNOSIS — Z96.641 PRESENCE OF RIGHT ARTIFICIAL HIP JOINT: Chronic | ICD-10-CM

## 2023-07-24 DIAGNOSIS — Z96.652 PRESENCE OF LEFT ARTIFICIAL KNEE JOINT: Chronic | ICD-10-CM

## 2023-07-24 PROCEDURE — 70551 MRI BRAIN STEM W/O DYE: CPT | Mod: 26,MH

## 2023-07-24 PROCEDURE — 76377 3D RENDER W/INTRP POSTPROCES: CPT | Mod: 26

## 2023-07-25 ENCOUNTER — RX RENEWAL (OUTPATIENT)
Age: 78
End: 2023-07-25

## 2023-08-04 ENCOUNTER — RX RENEWAL (OUTPATIENT)
Age: 78
End: 2023-08-04

## 2023-08-15 NOTE — DISCHARGE NOTE NURSING/CASE MANAGEMENT/SOCIAL WORK - MODE OF TRANSPORTATION
[address] [facility] [address]  Dept Phone: [phone]    Radiation Oncology On Treatment Visit Note    Patient Name:  Mann Jones  YOB: 1970  MRN:  [id number]  Dictating Physician:  Shonda Mills MD    Diagnosis:  Malignant neoplasm of upper-inner quadrant of left breast in female, estrogen receptor positive (CMD) C50.212, Z17.0    Cancer Staging   No matching staging information was found for the patient. Radiation Treatments     Active   Plans   L Breast_BH_SIB [1.1LBrstBHSIB]   Most recent treatment: Dose planned: 320 cGy (fraction 11 on 8/15/2023)   Total: Dose planned: 4,800 cGy (15 fractions)   Elapsed Days: 14      Reference Points   LT Breast   Most recent treatment: Dose given: 320 cGy (on 8/15/2023)   Total: Dose given: 3,520 cGy   Elapsed Days: 14                   Vitals:    08/15/23 0932   BP: 114/77   Pulse: 89   Resp: 16   Temp: 98.8 Â°F (37.1 Â°C)   SpO2: 96%   PainSc:  0   LMP: 04/27/2023       ECOG Performance Status: 0-Fully active, able to carry on all pre-disease performance without restriction    Physical Exam     No skin erythema. No desquamation. Adenopathy: None. Image / Setup Review:  Chart, Dosimetry, Images, and Setup Reviewed. Impression / Plan: Continue radiotherapy treatments. The patient has a bit of a cough and upper respiratory symptoms. She is wearing a mask.     Plan for Pain: Continue RT as prescribed    Radiation Therapy Visit    Symptoms within expected range  Radiation dose schedule reviewed and remains acceptable  Dosimetry plan remains acceptable  Radiation technique remains acceptable  Films reviewed and remain acceptable  Patient setup reviewed and remains acceptable  Pain assessed  Pain management planned  Continue radiation therapy Wheelchair/Stroller

## 2023-09-25 ENCOUNTER — RX RENEWAL (OUTPATIENT)
Age: 78
End: 2023-09-25

## 2023-09-25 RX ORDER — MELOXICAM 7.5 MG/1
7.5 TABLET ORAL DAILY
Qty: 60 | Refills: 0 | Status: ACTIVE | COMMUNITY
Start: 2021-04-16 | End: 1900-01-01

## 2023-09-27 NOTE — H&P ADULT - REASON FOR ADMISSION
left knee pain

## 2023-11-06 RX ORDER — UNDERPADS 23" X 36"
EACH MISCELLANEOUS
Qty: 4 | Refills: 11 | Status: ACTIVE | COMMUNITY
Start: 2023-11-06 | End: 1900-01-01

## 2023-11-13 ENCOUNTER — NON-APPOINTMENT (OUTPATIENT)
Age: 78
End: 2023-11-13

## 2023-11-13 ENCOUNTER — LABORATORY RESULT (OUTPATIENT)
Age: 78
End: 2023-11-13

## 2023-11-13 ENCOUNTER — APPOINTMENT (OUTPATIENT)
Dept: HEART AND VASCULAR | Facility: CLINIC | Age: 78
End: 2023-11-13
Payer: MEDICARE

## 2023-11-13 VITALS
HEART RATE: 65 BPM | TEMPERATURE: 97.6 F | DIASTOLIC BLOOD PRESSURE: 70 MMHG | OXYGEN SATURATION: 99 % | SYSTOLIC BLOOD PRESSURE: 140 MMHG

## 2023-11-13 DIAGNOSIS — E78.5 HYPERLIPIDEMIA, UNSPECIFIED: ICD-10-CM

## 2023-11-13 DIAGNOSIS — R73.09 OTHER ABNORMAL GLUCOSE: ICD-10-CM

## 2023-11-13 DIAGNOSIS — I10 ESSENTIAL (PRIMARY) HYPERTENSION: ICD-10-CM

## 2023-11-13 DIAGNOSIS — F03.918 UNSPECIFIED DEMENTIA, UNSPECIFIED SEVERITY, WITH OTHER BEHAVIORAL DISTURBANCE: ICD-10-CM

## 2023-11-13 PROCEDURE — 99214 OFFICE O/P EST MOD 30 MIN: CPT

## 2023-11-13 PROCEDURE — 36415 COLL VENOUS BLD VENIPUNCTURE: CPT

## 2023-11-13 PROCEDURE — 93000 ELECTROCARDIOGRAM COMPLETE: CPT

## 2023-11-14 ENCOUNTER — INPATIENT (INPATIENT)
Facility: HOSPITAL | Age: 78
LOS: 3 days | Discharge: HOME | DRG: 377 | End: 2023-11-18
Attending: STUDENT IN AN ORGANIZED HEALTH CARE EDUCATION/TRAINING PROGRAM | Admitting: INTERNAL MEDICINE
Payer: MEDICARE

## 2023-11-14 VITALS
RESPIRATION RATE: 22 BRPM | HEART RATE: 97 BPM | DIASTOLIC BLOOD PRESSURE: 66 MMHG | OXYGEN SATURATION: 98 % | WEIGHT: 190.04 LBS | SYSTOLIC BLOOD PRESSURE: 151 MMHG | TEMPERATURE: 98 F

## 2023-11-14 DIAGNOSIS — Z29.9 ENCOUNTER FOR PROPHYLACTIC MEASURES, UNSPECIFIED: ICD-10-CM

## 2023-11-14 DIAGNOSIS — F03.90 UNSPECIFIED DEMENTIA WITHOUT BEHAVIORAL DISTURBANCE: ICD-10-CM

## 2023-11-14 DIAGNOSIS — Z87.898 PERSONAL HISTORY OF OTHER SPECIFIED CONDITIONS: ICD-10-CM

## 2023-11-14 DIAGNOSIS — Z96.652 PRESENCE OF LEFT ARTIFICIAL KNEE JOINT: Chronic | ICD-10-CM

## 2023-11-14 DIAGNOSIS — Z41.9 ENCOUNTER FOR PROCEDURE FOR PURPOSES OTHER THAN REMEDYING HEALTH STATE, UNSPECIFIED: Chronic | ICD-10-CM

## 2023-11-14 DIAGNOSIS — I10 ESSENTIAL (PRIMARY) HYPERTENSION: ICD-10-CM

## 2023-11-14 DIAGNOSIS — D64.9 ANEMIA, UNSPECIFIED: ICD-10-CM

## 2023-11-14 DIAGNOSIS — E78.5 HYPERLIPIDEMIA, UNSPECIFIED: ICD-10-CM

## 2023-11-14 DIAGNOSIS — M19.90 UNSPECIFIED OSTEOARTHRITIS, UNSPECIFIED SITE: ICD-10-CM

## 2023-11-14 DIAGNOSIS — Z96.651 PRESENCE OF RIGHT ARTIFICIAL KNEE JOINT: Chronic | ICD-10-CM

## 2023-11-14 DIAGNOSIS — Z96.641 PRESENCE OF RIGHT ARTIFICIAL HIP JOINT: Chronic | ICD-10-CM

## 2023-11-14 DIAGNOSIS — Z96.642 PRESENCE OF LEFT ARTIFICIAL HIP JOINT: Chronic | ICD-10-CM

## 2023-11-14 LAB
ALBUMIN SERPL ELPH-MCNC: 3.6 G/DL — SIGNIFICANT CHANGE UP (ref 3.3–5)
ALBUMIN SERPL ELPH-MCNC: 3.6 G/DL — SIGNIFICANT CHANGE UP (ref 3.3–5)
ALBUMIN SERPL ELPH-MCNC: 4.1 G/DL — SIGNIFICANT CHANGE UP (ref 3.3–5)
ALBUMIN SERPL ELPH-MCNC: 4.1 G/DL — SIGNIFICANT CHANGE UP (ref 3.3–5)
ALBUMIN SERPL ELPH-MCNC: 4.4 G/DL
ALP BLD-CCNC: 111 U/L
ALP SERPL-CCNC: 110 U/L — SIGNIFICANT CHANGE UP (ref 40–120)
ALP SERPL-CCNC: 110 U/L — SIGNIFICANT CHANGE UP (ref 40–120)
ALP SERPL-CCNC: 99 U/L — SIGNIFICANT CHANGE UP (ref 40–120)
ALP SERPL-CCNC: 99 U/L — SIGNIFICANT CHANGE UP (ref 40–120)
ALT FLD-CCNC: 13 U/L — SIGNIFICANT CHANGE UP (ref 10–45)
ALT FLD-CCNC: 13 U/L — SIGNIFICANT CHANGE UP (ref 10–45)
ALT FLD-CCNC: 14 U/L — SIGNIFICANT CHANGE UP (ref 10–45)
ALT FLD-CCNC: 14 U/L — SIGNIFICANT CHANGE UP (ref 10–45)
ALT SERPL-CCNC: 15 U/L
AMMONIA BLD-MCNC: 15 UMOL/L — SIGNIFICANT CHANGE UP (ref 11–55)
AMMONIA BLD-MCNC: 15 UMOL/L — SIGNIFICANT CHANGE UP (ref 11–55)
ANION GAP SERPL CALC-SCNC: 10 MMOL/L — SIGNIFICANT CHANGE UP (ref 5–17)
ANION GAP SERPL CALC-SCNC: 10 MMOL/L — SIGNIFICANT CHANGE UP (ref 5–17)
ANION GAP SERPL CALC-SCNC: 12 MMOL/L — SIGNIFICANT CHANGE UP (ref 5–17)
ANION GAP SERPL CALC-SCNC: 12 MMOL/L — SIGNIFICANT CHANGE UP (ref 5–17)
ANION GAP SERPL CALC-SCNC: 14 MMOL/L
ANION GAP SERPL CALC-SCNC: 9 MMOL/L — SIGNIFICANT CHANGE UP (ref 5–17)
ANION GAP SERPL CALC-SCNC: 9 MMOL/L — SIGNIFICANT CHANGE UP (ref 5–17)
ANISOCYTOSIS BLD QL: SIGNIFICANT CHANGE UP
ANISOCYTOSIS BLD QL: SIGNIFICANT CHANGE UP
APTT BLD: 25.7 SEC — SIGNIFICANT CHANGE UP (ref 24.5–35.6)
APTT BLD: 25.7 SEC — SIGNIFICANT CHANGE UP (ref 24.5–35.6)
AST SERPL-CCNC: 20 U/L — SIGNIFICANT CHANGE UP (ref 10–40)
AST SERPL-CCNC: 23 U/L
BASE EXCESS BLDA CALC-SCNC: 1 MMOL/L — SIGNIFICANT CHANGE UP (ref -2–3)
BASE EXCESS BLDA CALC-SCNC: 1 MMOL/L — SIGNIFICANT CHANGE UP (ref -2–3)
BASE EXCESS BLDV CALC-SCNC: -0.5 MMOL/L — SIGNIFICANT CHANGE UP (ref -2–3)
BASE EXCESS BLDV CALC-SCNC: -0.5 MMOL/L — SIGNIFICANT CHANGE UP (ref -2–3)
BASE EXCESS BLDV CALC-SCNC: 1.4 MMOL/L — SIGNIFICANT CHANGE UP (ref -2–3)
BASE EXCESS BLDV CALC-SCNC: 1.4 MMOL/L — SIGNIFICANT CHANGE UP (ref -2–3)
BASOPHILS # BLD AUTO: 0.13 K/UL — SIGNIFICANT CHANGE UP (ref 0–0.2)
BASOPHILS # BLD AUTO: 0.13 K/UL — SIGNIFICANT CHANGE UP (ref 0–0.2)
BASOPHILS # BLD AUTO: 0.22 K/UL
BASOPHILS # BLD AUTO: 0.31 K/UL — HIGH (ref 0–0.2)
BASOPHILS # BLD AUTO: 0.31 K/UL — HIGH (ref 0–0.2)
BASOPHILS NFR BLD AUTO: 1.8 % — SIGNIFICANT CHANGE UP (ref 0–2)
BASOPHILS NFR BLD AUTO: 1.8 % — SIGNIFICANT CHANGE UP (ref 0–2)
BASOPHILS NFR BLD AUTO: 2.6 %
BASOPHILS NFR BLD AUTO: 3.8 % — HIGH (ref 0–2)
BASOPHILS NFR BLD AUTO: 3.8 % — HIGH (ref 0–2)
BILIRUB DIRECT SERPL-MCNC: <0.2 MG/DL — SIGNIFICANT CHANGE UP (ref 0–0.3)
BILIRUB DIRECT SERPL-MCNC: <0.2 MG/DL — SIGNIFICANT CHANGE UP (ref 0–0.3)
BILIRUB INDIRECT FLD-MCNC: SIGNIFICANT CHANGE UP MG/DL (ref 0.2–1)
BILIRUB INDIRECT FLD-MCNC: SIGNIFICANT CHANGE UP MG/DL (ref 0.2–1)
BILIRUB SERPL-MCNC: 0.4 MG/DL
BILIRUB SERPL-MCNC: 0.4 MG/DL — SIGNIFICANT CHANGE UP (ref 0.2–1.2)
BLD GP AB SCN SERPL QL: NEGATIVE — SIGNIFICANT CHANGE UP
BLD GP AB SCN SERPL QL: NEGATIVE — SIGNIFICANT CHANGE UP
BUN SERPL-MCNC: 13 MG/DL
BUN SERPL-MCNC: 13 MG/DL — SIGNIFICANT CHANGE UP (ref 7–23)
BUN SERPL-MCNC: 13 MG/DL — SIGNIFICANT CHANGE UP (ref 7–23)
BUN SERPL-MCNC: 14 MG/DL — SIGNIFICANT CHANGE UP (ref 7–23)
CA-I SERPL-SCNC: SIGNIFICANT CHANGE UP MMOL/L (ref 1.15–1.33)
CA-I SERPL-SCNC: SIGNIFICANT CHANGE UP MMOL/L (ref 1.15–1.33)
CALCIUM SERPL-MCNC: 9 MG/DL — SIGNIFICANT CHANGE UP (ref 8.4–10.5)
CALCIUM SERPL-MCNC: 9.4 MG/DL — SIGNIFICANT CHANGE UP (ref 8.4–10.5)
CALCIUM SERPL-MCNC: 9.4 MG/DL — SIGNIFICANT CHANGE UP (ref 8.4–10.5)
CALCIUM SERPL-MCNC: 9.5 MG/DL
CHLORIDE SERPL-SCNC: 107 MMOL/L
CHLORIDE SERPL-SCNC: 109 MMOL/L — HIGH (ref 96–108)
CHLORIDE SERPL-SCNC: 111 MMOL/L — HIGH (ref 96–108)
CHLORIDE SERPL-SCNC: 111 MMOL/L — HIGH (ref 96–108)
CHOLEST SERPL-MCNC: 130 MG/DL
CO2 BLDA-SCNC: 28 MMOL/L — HIGH (ref 19–24)
CO2 BLDA-SCNC: 28 MMOL/L — HIGH (ref 19–24)
CO2 BLDV-SCNC: 27.9 MMOL/L — HIGH (ref 22–26)
CO2 BLDV-SCNC: 27.9 MMOL/L — HIGH (ref 22–26)
CO2 BLDV-SCNC: 29.7 MMOL/L — HIGH (ref 22–26)
CO2 BLDV-SCNC: 29.7 MMOL/L — HIGH (ref 22–26)
CO2 SERPL-SCNC: 23 MMOL/L
CO2 SERPL-SCNC: 24 MMOL/L — SIGNIFICANT CHANGE UP (ref 22–31)
CO2 SERPL-SCNC: 26 MMOL/L — SIGNIFICANT CHANGE UP (ref 22–31)
CO2 SERPL-SCNC: 26 MMOL/L — SIGNIFICANT CHANGE UP (ref 22–31)
CREAT SERPL-MCNC: 0.8 MG/DL — SIGNIFICANT CHANGE UP (ref 0.5–1.3)
CREAT SERPL-MCNC: 0.8 MG/DL — SIGNIFICANT CHANGE UP (ref 0.5–1.3)
CREAT SERPL-MCNC: 0.86 MG/DL — SIGNIFICANT CHANGE UP (ref 0.5–1.3)
CREAT SERPL-MCNC: 0.86 MG/DL — SIGNIFICANT CHANGE UP (ref 0.5–1.3)
CREAT SERPL-MCNC: 0.88 MG/DL
CREAT SERPL-MCNC: 0.89 MG/DL — SIGNIFICANT CHANGE UP (ref 0.5–1.3)
CREAT SERPL-MCNC: 0.89 MG/DL — SIGNIFICANT CHANGE UP (ref 0.5–1.3)
DACRYOCYTES BLD QL SMEAR: SLIGHT — SIGNIFICANT CHANGE UP
DACRYOCYTES BLD QL SMEAR: SLIGHT — SIGNIFICANT CHANGE UP
EGFR: 66 ML/MIN/1.73M2 — SIGNIFICANT CHANGE UP
EGFR: 66 ML/MIN/1.73M2 — SIGNIFICANT CHANGE UP
EGFR: 67 ML/MIN/1.73M2
EGFR: 69 ML/MIN/1.73M2 — SIGNIFICANT CHANGE UP
EGFR: 69 ML/MIN/1.73M2 — SIGNIFICANT CHANGE UP
EGFR: 75 ML/MIN/1.73M2 — SIGNIFICANT CHANGE UP
EGFR: 75 ML/MIN/1.73M2 — SIGNIFICANT CHANGE UP
ELLIPTOCYTES BLD QL SMEAR: SLIGHT — SIGNIFICANT CHANGE UP
ELLIPTOCYTES BLD QL SMEAR: SLIGHT — SIGNIFICANT CHANGE UP
EOSINOPHIL # BLD AUTO: 0.07 K/UL
EOSINOPHIL # BLD AUTO: 0.27 K/UL — SIGNIFICANT CHANGE UP (ref 0–0.5)
EOSINOPHIL # BLD AUTO: 0.27 K/UL — SIGNIFICANT CHANGE UP (ref 0–0.5)
EOSINOPHIL # BLD AUTO: 0.54 K/UL — HIGH (ref 0–0.5)
EOSINOPHIL # BLD AUTO: 0.54 K/UL — HIGH (ref 0–0.5)
EOSINOPHIL NFR BLD AUTO: 0.9 %
EOSINOPHIL NFR BLD AUTO: 3.8 % — SIGNIFICANT CHANGE UP (ref 0–6)
EOSINOPHIL NFR BLD AUTO: 3.8 % — SIGNIFICANT CHANGE UP (ref 0–6)
EOSINOPHIL NFR BLD AUTO: 6.6 % — HIGH (ref 0–6)
EOSINOPHIL NFR BLD AUTO: 6.6 % — HIGH (ref 0–6)
ESTIMATED AVERAGE GLUCOSE: 120 MG/DL
ETHANOL SERPL-MCNC: <10 MG/DL — SIGNIFICANT CHANGE UP (ref 0–10)
ETHANOL SERPL-MCNC: <10 MG/DL — SIGNIFICANT CHANGE UP (ref 0–10)
FERRITIN SERPL-MCNC: 12 NG/ML — LOW (ref 13–330)
FERRITIN SERPL-MCNC: 12 NG/ML — LOW (ref 13–330)
FOLATE SERPL-MCNC: 15.1 NG/ML — SIGNIFICANT CHANGE UP
FOLATE SERPL-MCNC: 15.1 NG/ML — SIGNIFICANT CHANGE UP
GAS PNL BLDV: SIGNIFICANT CHANGE UP
GAS PNL BLDV: SIGNIFICANT CHANGE UP MMOL/L (ref 136–145)
GAS PNL BLDV: SIGNIFICANT CHANGE UP MMOL/L (ref 136–145)
GIANT PLATELETS BLD QL SMEAR: PRESENT — SIGNIFICANT CHANGE UP
GIANT PLATELETS BLD QL SMEAR: PRESENT — SIGNIFICANT CHANGE UP
GLUCOSE SERPL-MCNC: 104 MG/DL — HIGH (ref 70–99)
GLUCOSE SERPL-MCNC: 104 MG/DL — HIGH (ref 70–99)
GLUCOSE SERPL-MCNC: 129 MG/DL
GLUCOSE SERPL-MCNC: 135 MG/DL — HIGH (ref 70–99)
GLUCOSE SERPL-MCNC: 135 MG/DL — HIGH (ref 70–99)
GLUCOSE SERPL-MCNC: 94 MG/DL — SIGNIFICANT CHANGE UP (ref 70–99)
GLUCOSE SERPL-MCNC: 94 MG/DL — SIGNIFICANT CHANGE UP (ref 70–99)
HAPTOGLOB SERPL-MCNC: 166 MG/DL — SIGNIFICANT CHANGE UP (ref 34–200)
HAPTOGLOB SERPL-MCNC: 166 MG/DL — SIGNIFICANT CHANGE UP (ref 34–200)
HBA1C MFR BLD HPLC: 5.8 %
HCO3 BLDA-SCNC: 27 MMOL/L — SIGNIFICANT CHANGE UP (ref 21–28)
HCO3 BLDA-SCNC: 27 MMOL/L — SIGNIFICANT CHANGE UP (ref 21–28)
HCO3 BLDV-SCNC: 26 MMOL/L — SIGNIFICANT CHANGE UP (ref 22–29)
HCO3 BLDV-SCNC: 26 MMOL/L — SIGNIFICANT CHANGE UP (ref 22–29)
HCO3 BLDV-SCNC: 28 MMOL/L — SIGNIFICANT CHANGE UP (ref 22–29)
HCO3 BLDV-SCNC: 28 MMOL/L — SIGNIFICANT CHANGE UP (ref 22–29)
HCT VFR BLD CALC: 17.9 % — CRITICAL LOW (ref 34.5–45)
HCT VFR BLD CALC: 17.9 % — CRITICAL LOW (ref 34.5–45)
HCT VFR BLD CALC: 19.1 %
HCT VFR BLD CALC: 24.7 % — LOW (ref 34.5–45)
HCT VFR BLD CALC: 24.7 % — LOW (ref 34.5–45)
HDLC SERPL-MCNC: 27 MG/DL
HGB BLD-MCNC: 4.5 G/DL — CRITICAL LOW (ref 11.5–15.5)
HGB BLD-MCNC: 4.5 G/DL — CRITICAL LOW (ref 11.5–15.5)
HGB BLD-MCNC: 4.6 G/DL
HGB BLD-MCNC: 6.6 G/DL — CRITICAL LOW (ref 11.5–15.5)
HGB BLD-MCNC: 6.6 G/DL — CRITICAL LOW (ref 11.5–15.5)
HYPOCHROMIA BLD QL: SIGNIFICANT CHANGE UP
HYPOCHROMIA BLD QL: SIGNIFICANT CHANGE UP
IMM GRANULOCYTES NFR BLD AUTO: 1.3 % — HIGH (ref 0–0.9)
IMM GRANULOCYTES NFR BLD AUTO: 1.3 % — HIGH (ref 0–0.9)
INR BLD: 1.05 — SIGNIFICANT CHANGE UP (ref 0.85–1.18)
INR BLD: 1.05 — SIGNIFICANT CHANGE UP (ref 0.85–1.18)
IRON SATN MFR SERPL: 21 UG/DL — LOW (ref 30–160)
IRON SATN MFR SERPL: 21 UG/DL — LOW (ref 30–160)
IRON SATN MFR SERPL: 4 % — LOW (ref 14–50)
IRON SATN MFR SERPL: 4 % — LOW (ref 14–50)
LACTATE SERPL-SCNC: 0.7 MMOL/L — SIGNIFICANT CHANGE UP (ref 0.5–2)
LACTATE SERPL-SCNC: 0.7 MMOL/L — SIGNIFICANT CHANGE UP (ref 0.5–2)
LDH SERPL L TO P-CCNC: 354 U/L — HIGH (ref 50–242)
LDH SERPL L TO P-CCNC: 354 U/L — HIGH (ref 50–242)
LDLC SERPL CALC-MCNC: 82 MG/DL
LYMPHOCYTES # BLD AUTO: 0.94 K/UL
LYMPHOCYTES # BLD AUTO: 1.18 K/UL — SIGNIFICANT CHANGE UP (ref 1–3.3)
LYMPHOCYTES # BLD AUTO: 1.18 K/UL — SIGNIFICANT CHANGE UP (ref 1–3.3)
LYMPHOCYTES # BLD AUTO: 16.6 % — SIGNIFICANT CHANGE UP (ref 13–44)
LYMPHOCYTES # BLD AUTO: 16.6 % — SIGNIFICANT CHANGE UP (ref 13–44)
LYMPHOCYTES # BLD AUTO: 3.07 K/UL — SIGNIFICANT CHANGE UP (ref 1–3.3)
LYMPHOCYTES # BLD AUTO: 3.07 K/UL — SIGNIFICANT CHANGE UP (ref 1–3.3)
LYMPHOCYTES # BLD AUTO: 37.7 % — SIGNIFICANT CHANGE UP (ref 13–44)
LYMPHOCYTES # BLD AUTO: 37.7 % — SIGNIFICANT CHANGE UP (ref 13–44)
LYMPHOCYTES NFR BLD AUTO: 11.4 %
MACROCYTES BLD QL: SLIGHT — SIGNIFICANT CHANGE UP
MACROCYTES BLD QL: SLIGHT — SIGNIFICANT CHANGE UP
MAGNESIUM SERPL-MCNC: 2 MG/DL — SIGNIFICANT CHANGE UP (ref 1.6–2.6)
MAGNESIUM SERPL-MCNC: 2 MG/DL — SIGNIFICANT CHANGE UP (ref 1.6–2.6)
MAGNESIUM SERPL-MCNC: 2.1 MG/DL — SIGNIFICANT CHANGE UP (ref 1.6–2.6)
MAGNESIUM SERPL-MCNC: 2.1 MG/DL — SIGNIFICANT CHANGE UP (ref 1.6–2.6)
MAN DIFF?: NORMAL
MANUAL SMEAR VERIFICATION: SIGNIFICANT CHANGE UP
MANUAL SMEAR VERIFICATION: SIGNIFICANT CHANGE UP
MCHC RBC-ENTMCNC: 17.5 PG
MCHC RBC-ENTMCNC: 17.6 PG — LOW (ref 27–34)
MCHC RBC-ENTMCNC: 17.6 PG — LOW (ref 27–34)
MCHC RBC-ENTMCNC: 20.4 PG — LOW (ref 27–34)
MCHC RBC-ENTMCNC: 20.4 PG — LOW (ref 27–34)
MCHC RBC-ENTMCNC: 24.1 GM/DL
MCHC RBC-ENTMCNC: 25.1 GM/DL — LOW (ref 32–36)
MCHC RBC-ENTMCNC: 25.1 GM/DL — LOW (ref 32–36)
MCHC RBC-ENTMCNC: 26.7 GM/DL — LOW (ref 32–36)
MCHC RBC-ENTMCNC: 26.7 GM/DL — LOW (ref 32–36)
MCV RBC AUTO: 70.2 FL — LOW (ref 80–100)
MCV RBC AUTO: 70.2 FL — LOW (ref 80–100)
MCV RBC AUTO: 72.6 FL
MCV RBC AUTO: 76.5 FL — LOW (ref 80–100)
MCV RBC AUTO: 76.5 FL — LOW (ref 80–100)
MICROCYTES BLD QL: SIGNIFICANT CHANGE UP
MICROCYTES BLD QL: SIGNIFICANT CHANGE UP
MONOCYTES # BLD AUTO: 0.07 K/UL
MONOCYTES # BLD AUTO: 0.31 K/UL — SIGNIFICANT CHANGE UP (ref 0–0.9)
MONOCYTES # BLD AUTO: 0.31 K/UL — SIGNIFICANT CHANGE UP (ref 0–0.9)
MONOCYTES # BLD AUTO: 0.58 K/UL — SIGNIFICANT CHANGE UP (ref 0–0.9)
MONOCYTES # BLD AUTO: 0.58 K/UL — SIGNIFICANT CHANGE UP (ref 0–0.9)
MONOCYTES NFR BLD AUTO: 0.9 %
MONOCYTES NFR BLD AUTO: 3.8 % — SIGNIFICANT CHANGE UP (ref 2–14)
MONOCYTES NFR BLD AUTO: 3.8 % — SIGNIFICANT CHANGE UP (ref 2–14)
MONOCYTES NFR BLD AUTO: 8.2 % — SIGNIFICANT CHANGE UP (ref 2–14)
MONOCYTES NFR BLD AUTO: 8.2 % — SIGNIFICANT CHANGE UP (ref 2–14)
NEUTROPHILS # BLD AUTO: 3.92 K/UL — SIGNIFICANT CHANGE UP (ref 1.8–7.4)
NEUTROPHILS # BLD AUTO: 3.92 K/UL — SIGNIFICANT CHANGE UP (ref 1.8–7.4)
NEUTROPHILS # BLD AUTO: 4.85 K/UL — SIGNIFICANT CHANGE UP (ref 1.8–7.4)
NEUTROPHILS # BLD AUTO: 4.85 K/UL — SIGNIFICANT CHANGE UP (ref 1.8–7.4)
NEUTROPHILS # BLD AUTO: 6.96 K/UL
NEUTROPHILS NFR BLD AUTO: 48.1 % — SIGNIFICANT CHANGE UP (ref 43–77)
NEUTROPHILS NFR BLD AUTO: 48.1 % — SIGNIFICANT CHANGE UP (ref 43–77)
NEUTROPHILS NFR BLD AUTO: 68.3 % — SIGNIFICANT CHANGE UP (ref 43–77)
NEUTROPHILS NFR BLD AUTO: 68.3 % — SIGNIFICANT CHANGE UP (ref 43–77)
NEUTROPHILS NFR BLD AUTO: 84.2 %
NONHDLC SERPL-MCNC: 103 MG/DL
NRBC # BLD: 2 /100 WBCS — HIGH (ref 0–0)
NRBC # BLD: 2 /100 WBCS — HIGH (ref 0–0)
NRBC # BLD: 2 /100 — HIGH (ref 0–0)
NRBC # BLD: 2 /100 — HIGH (ref 0–0)
NRBC # BLD: SIGNIFICANT CHANGE UP /100 WBCS (ref 0–0)
NRBC # BLD: SIGNIFICANT CHANGE UP /100 WBCS (ref 0–0)
OB PNL STL: NEGATIVE — SIGNIFICANT CHANGE UP
OB PNL STL: NEGATIVE — SIGNIFICANT CHANGE UP
OVALOCYTES BLD QL SMEAR: SIGNIFICANT CHANGE UP
OVALOCYTES BLD QL SMEAR: SIGNIFICANT CHANGE UP
PCO2 BLDA: 45 MMHG — SIGNIFICANT CHANGE UP (ref 32–45)
PCO2 BLDA: 45 MMHG — SIGNIFICANT CHANGE UP (ref 32–45)
PCO2 BLDV: 51 MMHG — HIGH (ref 39–42)
PCO2 BLDV: 51 MMHG — HIGH (ref 39–42)
PCO2 BLDV: 52 MMHG — HIGH (ref 39–42)
PCO2 BLDV: 52 MMHG — HIGH (ref 39–42)
PH BLDA: 7.38 — SIGNIFICANT CHANGE UP (ref 7.35–7.45)
PH BLDA: 7.38 — SIGNIFICANT CHANGE UP (ref 7.35–7.45)
PH BLDV: 7.32 — SIGNIFICANT CHANGE UP (ref 7.32–7.43)
PH BLDV: 7.32 — SIGNIFICANT CHANGE UP (ref 7.32–7.43)
PH BLDV: 7.34 — SIGNIFICANT CHANGE UP (ref 7.32–7.43)
PH BLDV: 7.34 — SIGNIFICANT CHANGE UP (ref 7.32–7.43)
PHOSPHATE SERPL-MCNC: 2.2 MG/DL — LOW (ref 2.5–4.5)
PHOSPHATE SERPL-MCNC: 2.2 MG/DL — LOW (ref 2.5–4.5)
PLAT MORPH BLD: ABNORMAL
PLAT MORPH BLD: ABNORMAL
PLATELET # BLD AUTO: 371 K/UL — SIGNIFICANT CHANGE UP (ref 150–400)
PLATELET # BLD AUTO: 371 K/UL — SIGNIFICANT CHANGE UP (ref 150–400)
PLATELET # BLD AUTO: 438 K/UL — HIGH (ref 150–400)
PLATELET # BLD AUTO: 438 K/UL — HIGH (ref 150–400)
PLATELET # BLD AUTO: 449 K/UL
PO2 BLDA: 112 MMHG — HIGH (ref 83–108)
PO2 BLDA: 112 MMHG — HIGH (ref 83–108)
PO2 BLDV: <33 MMHG — LOW (ref 25–45)
PO2 BLDV: <33 MMHG — LOW (ref 25–45)
PO2 BLDV: <33 MMHG — SIGNIFICANT CHANGE UP (ref 25–45)
PO2 BLDV: <33 MMHG — SIGNIFICANT CHANGE UP (ref 25–45)
POIKILOCYTOSIS BLD QL AUTO: SIGNIFICANT CHANGE UP
POIKILOCYTOSIS BLD QL AUTO: SIGNIFICANT CHANGE UP
POLYCHROMASIA BLD QL SMEAR: SLIGHT — SIGNIFICANT CHANGE UP
POLYCHROMASIA BLD QL SMEAR: SLIGHT — SIGNIFICANT CHANGE UP
POTASSIUM BLDV-SCNC: 3.9 MMOL/L — SIGNIFICANT CHANGE UP (ref 3.5–5.1)
POTASSIUM BLDV-SCNC: 3.9 MMOL/L — SIGNIFICANT CHANGE UP (ref 3.5–5.1)
POTASSIUM SERPL-MCNC: 3.9 MMOL/L — SIGNIFICANT CHANGE UP (ref 3.5–5.3)
POTASSIUM SERPL-MCNC: 3.9 MMOL/L — SIGNIFICANT CHANGE UP (ref 3.5–5.3)
POTASSIUM SERPL-MCNC: 4 MMOL/L — SIGNIFICANT CHANGE UP (ref 3.5–5.3)
POTASSIUM SERPL-SCNC: 3.9 MMOL/L — SIGNIFICANT CHANGE UP (ref 3.5–5.3)
POTASSIUM SERPL-SCNC: 3.9 MMOL/L — SIGNIFICANT CHANGE UP (ref 3.5–5.3)
POTASSIUM SERPL-SCNC: 4 MMOL/L — SIGNIFICANT CHANGE UP (ref 3.5–5.3)
POTASSIUM SERPL-SCNC: 4.5 MMOL/L
PROT SERPL-MCNC: 6.8 G/DL — SIGNIFICANT CHANGE UP (ref 6–8.3)
PROT SERPL-MCNC: 6.8 G/DL — SIGNIFICANT CHANGE UP (ref 6–8.3)
PROT SERPL-MCNC: 7 G/DL — SIGNIFICANT CHANGE UP (ref 6–8.3)
PROT SERPL-MCNC: 7 G/DL — SIGNIFICANT CHANGE UP (ref 6–8.3)
PROT SERPL-MCNC: 7.1 G/DL
PROTHROM AB SERPL-ACNC: 11.9 SEC — SIGNIFICANT CHANGE UP (ref 9.5–13)
PROTHROM AB SERPL-ACNC: 11.9 SEC — SIGNIFICANT CHANGE UP (ref 9.5–13)
RBC # BLD: 2.55 M/UL — LOW (ref 3.8–5.2)
RBC # BLD: 2.55 M/UL — LOW (ref 3.8–5.2)
RBC # BLD: 2.63 M/UL
RBC # BLD: 3.23 M/UL — LOW (ref 3.8–5.2)
RBC # BLD: 3.23 M/UL — LOW (ref 3.8–5.2)
RBC # FLD: 19.9 % — HIGH (ref 10.3–14.5)
RBC # FLD: 19.9 % — HIGH (ref 10.3–14.5)
RBC # FLD: 20.2 % — HIGH (ref 10.3–14.5)
RBC # FLD: 20.2 % — HIGH (ref 10.3–14.5)
RBC # FLD: 20.5 %
RBC BLD AUTO: ABNORMAL
RBC BLD AUTO: ABNORMAL
RETICS #: 53.9 K/UL — SIGNIFICANT CHANGE UP (ref 25–125)
RETICS #: 53.9 K/UL — SIGNIFICANT CHANGE UP (ref 25–125)
RETICS/RBC NFR: 2.1 % — SIGNIFICANT CHANGE UP (ref 0.5–2.5)
RETICS/RBC NFR: 2.1 % — SIGNIFICANT CHANGE UP (ref 0.5–2.5)
RH IG SCN BLD-IMP: POSITIVE — SIGNIFICANT CHANGE UP
RH IG SCN BLD-IMP: POSITIVE — SIGNIFICANT CHANGE UP
SAO2 % BLDA: 99.6 % — HIGH (ref 94–98)
SAO2 % BLDA: 99.6 % — HIGH (ref 94–98)
SAO2 % BLDV: 26.4 % — LOW (ref 67–88)
SAO2 % BLDV: 26.4 % — LOW (ref 67–88)
SAO2 % BLDV: 52.4 % — LOW (ref 67–88)
SAO2 % BLDV: 52.4 % — LOW (ref 67–88)
SMUDGE CELLS # BLD: PRESENT — SIGNIFICANT CHANGE UP
SMUDGE CELLS # BLD: PRESENT — SIGNIFICANT CHANGE UP
SODIUM SERPL-SCNC: 144 MMOL/L
SODIUM SERPL-SCNC: 144 MMOL/L — SIGNIFICANT CHANGE UP (ref 135–145)
SODIUM SERPL-SCNC: 144 MMOL/L — SIGNIFICANT CHANGE UP (ref 135–145)
SODIUM SERPL-SCNC: 145 MMOL/L — SIGNIFICANT CHANGE UP (ref 135–145)
STOMATOCYTES BLD QL SMEAR: SLIGHT — SIGNIFICANT CHANGE UP
STOMATOCYTES BLD QL SMEAR: SLIGHT — SIGNIFICANT CHANGE UP
TARGETS BLD QL SMEAR: SLIGHT — SIGNIFICANT CHANGE UP
TARGETS BLD QL SMEAR: SLIGHT — SIGNIFICANT CHANGE UP
TIBC SERPL-MCNC: 488 UG/DL — HIGH (ref 220–430)
TIBC SERPL-MCNC: 488 UG/DL — HIGH (ref 220–430)
TRANSFERRIN SERPL-MCNC: 403 MG/DL — HIGH (ref 200–360)
TRANSFERRIN SERPL-MCNC: 403 MG/DL — HIGH (ref 200–360)
TRIGL SERPL-MCNC: 111 MG/DL
TROPONIN T, HIGH SENSITIVITY RESULT: 22 NG/L — SIGNIFICANT CHANGE UP (ref 0–51)
TROPONIN T, HIGH SENSITIVITY RESULT: 22 NG/L — SIGNIFICANT CHANGE UP (ref 0–51)
TSH SERPL-ACNC: 0.95 UIU/ML
TSH SERPL-MCNC: 1.05 UIU/ML — SIGNIFICANT CHANGE UP (ref 0.27–4.2)
TSH SERPL-MCNC: 1.05 UIU/ML — SIGNIFICANT CHANGE UP (ref 0.27–4.2)
UIBC SERPL-MCNC: 467 UG/DL — HIGH (ref 110–370)
UIBC SERPL-MCNC: 467 UG/DL — HIGH (ref 110–370)
VIT B12 SERPL-MCNC: 359 PG/ML — SIGNIFICANT CHANGE UP (ref 232–1245)
VIT B12 SERPL-MCNC: 359 PG/ML — SIGNIFICANT CHANGE UP (ref 232–1245)
WBC # BLD: 7.1 K/UL — SIGNIFICANT CHANGE UP (ref 3.8–10.5)
WBC # BLD: 7.1 K/UL — SIGNIFICANT CHANGE UP (ref 3.8–10.5)
WBC # BLD: 8.15 K/UL — SIGNIFICANT CHANGE UP (ref 3.8–10.5)
WBC # BLD: 8.15 K/UL — SIGNIFICANT CHANGE UP (ref 3.8–10.5)
WBC # FLD AUTO: 7.1 K/UL — SIGNIFICANT CHANGE UP (ref 3.8–10.5)
WBC # FLD AUTO: 7.1 K/UL — SIGNIFICANT CHANGE UP (ref 3.8–10.5)
WBC # FLD AUTO: 8.15 K/UL — SIGNIFICANT CHANGE UP (ref 3.8–10.5)
WBC # FLD AUTO: 8.15 K/UL — SIGNIFICANT CHANGE UP (ref 3.8–10.5)
WBC # FLD AUTO: 8.27 K/UL

## 2023-11-14 PROCEDURE — 93010 ELECTROCARDIOGRAM REPORT: CPT | Mod: 76

## 2023-11-14 PROCEDURE — 99222 1ST HOSP IP/OBS MODERATE 55: CPT | Mod: GC

## 2023-11-14 PROCEDURE — 70450 CT HEAD/BRAIN W/O DYE: CPT | Mod: 26,MA

## 2023-11-14 PROCEDURE — 99285 EMERGENCY DEPT VISIT HI MDM: CPT | Mod: GC

## 2023-11-14 PROCEDURE — 93306 TTE W/DOPPLER COMPLETE: CPT | Mod: 26

## 2023-11-14 PROCEDURE — 71045 X-RAY EXAM CHEST 1 VIEW: CPT | Mod: 26

## 2023-11-14 RX ORDER — PANTOPRAZOLE SODIUM 20 MG/1
40 TABLET, DELAYED RELEASE ORAL
Refills: 0 | Status: DISCONTINUED | OUTPATIENT
Start: 2023-11-14 | End: 2023-11-14

## 2023-11-14 RX ORDER — ATENOLOL 25 MG/1
25 TABLET ORAL DAILY
Refills: 0 | Status: DISCONTINUED | OUTPATIENT
Start: 2023-11-14 | End: 2023-11-15

## 2023-11-14 RX ORDER — FUROSEMIDE 40 MG
20 TABLET ORAL ONCE
Refills: 0 | Status: COMPLETED | OUTPATIENT
Start: 2023-11-14 | End: 2023-11-14

## 2023-11-14 RX ORDER — GABAPENTIN 400 MG/1
300 CAPSULE ORAL DAILY
Refills: 0 | Status: DISCONTINUED | OUTPATIENT
Start: 2023-11-14 | End: 2023-11-18

## 2023-11-14 RX ORDER — ERGOCALCIFEROL 1.25 MG/1
0 CAPSULE ORAL
Qty: 0 | Refills: 0 | DISCHARGE

## 2023-11-14 RX ORDER — QUETIAPINE FUMARATE 200 MG/1
100 TABLET, FILM COATED ORAL AT BEDTIME
Refills: 0 | Status: DISCONTINUED | OUTPATIENT
Start: 2023-11-14 | End: 2023-11-18

## 2023-11-14 RX ORDER — PANTOPRAZOLE SODIUM 20 MG/1
40 TABLET, DELAYED RELEASE ORAL EVERY 12 HOURS
Refills: 0 | Status: DISCONTINUED | OUTPATIENT
Start: 2023-11-14 | End: 2023-11-18

## 2023-11-14 RX ORDER — HYDRALAZINE HCL 50 MG
10 TABLET ORAL ONCE
Refills: 0 | Status: COMPLETED | OUTPATIENT
Start: 2023-11-14 | End: 2023-11-14

## 2023-11-14 RX ORDER — SIMVASTATIN 20 MG/1
10 TABLET, FILM COATED ORAL AT BEDTIME
Refills: 0 | Status: DISCONTINUED | OUTPATIENT
Start: 2023-11-14 | End: 2023-11-18

## 2023-11-14 RX ORDER — SERTRALINE 25 MG/1
100 TABLET, FILM COATED ORAL DAILY
Refills: 0 | Status: DISCONTINUED | OUTPATIENT
Start: 2023-11-14 | End: 2023-11-18

## 2023-11-14 RX ORDER — SERTRALINE 25 MG/1
1 TABLET, FILM COATED ORAL
Refills: 0 | DISCHARGE

## 2023-11-14 RX ORDER — SERTRALINE 25 MG/1
1 TABLET, FILM COATED ORAL
Qty: 0 | Refills: 0 | DISCHARGE

## 2023-11-14 RX ORDER — LOSARTAN POTASSIUM 100 MG/1
50 TABLET, FILM COATED ORAL EVERY 24 HOURS
Refills: 0 | Status: DISCONTINUED | OUTPATIENT
Start: 2023-11-14 | End: 2023-11-14

## 2023-11-14 RX ORDER — LOSARTAN POTASSIUM 100 MG/1
50 TABLET, FILM COATED ORAL ONCE
Refills: 0 | Status: COMPLETED | OUTPATIENT
Start: 2023-11-14 | End: 2023-11-14

## 2023-11-14 RX ORDER — CHOLECALCIFEROL (VITAMIN D3) 125 MCG
1 CAPSULE ORAL
Refills: 0 | DISCHARGE

## 2023-11-14 RX ORDER — IRON SUCROSE 20 MG/ML
300 INJECTION, SOLUTION INTRAVENOUS EVERY 24 HOURS
Refills: 0 | Status: COMPLETED | OUTPATIENT
Start: 2023-11-14 | End: 2023-11-15

## 2023-11-14 RX ORDER — IPRATROPIUM/ALBUTEROL SULFATE 18-103MCG
3 AEROSOL WITH ADAPTER (GRAM) INHALATION ONCE
Refills: 0 | Status: COMPLETED | OUTPATIENT
Start: 2023-11-14 | End: 2023-11-14

## 2023-11-14 RX ORDER — LOSARTAN POTASSIUM 100 MG/1
100 TABLET, FILM COATED ORAL DAILY
Refills: 0 | Status: DISCONTINUED | OUTPATIENT
Start: 2023-11-15 | End: 2023-11-18

## 2023-11-14 RX ORDER — KETOROLAC TROMETHAMINE 30 MG/ML
15 SYRINGE (ML) INJECTION ONCE
Refills: 0 | Status: DISCONTINUED | OUTPATIENT
Start: 2023-11-14 | End: 2023-11-14

## 2023-11-14 RX ORDER — FUROSEMIDE 40 MG
1 TABLET ORAL
Qty: 0 | Refills: 0 | DISCHARGE

## 2023-11-14 RX ORDER — SODIUM CHLORIDE 9 MG/ML
1000 INJECTION INTRAMUSCULAR; INTRAVENOUS; SUBCUTANEOUS ONCE
Refills: 0 | Status: COMPLETED | OUTPATIENT
Start: 2023-11-14 | End: 2023-11-14

## 2023-11-14 RX ORDER — SENNA PLUS 8.6 MG/1
2 TABLET ORAL AT BEDTIME
Refills: 0 | Status: DISCONTINUED | OUTPATIENT
Start: 2023-11-14 | End: 2023-11-18

## 2023-11-14 RX ORDER — LANOLIN ALCOHOL/MO/W.PET/CERES
3 CREAM (GRAM) TOPICAL AT BEDTIME
Refills: 0 | Status: DISCONTINUED | OUTPATIENT
Start: 2023-11-14 | End: 2023-11-14

## 2023-11-14 RX ORDER — POTASSIUM PHOSPHATE, MONOBASIC POTASSIUM PHOSPHATE, DIBASIC 236; 224 MG/ML; MG/ML
15 INJECTION, SOLUTION INTRAVENOUS ONCE
Refills: 0 | Status: COMPLETED | OUTPATIENT
Start: 2023-11-14 | End: 2023-11-14

## 2023-11-14 RX ORDER — POLYETHYLENE GLYCOL 3350 17 G/17G
17 POWDER, FOR SOLUTION ORAL DAILY
Refills: 0 | Status: DISCONTINUED | OUTPATIENT
Start: 2023-11-14 | End: 2023-11-18

## 2023-11-14 RX ORDER — ONDANSETRON 8 MG/1
4 TABLET, FILM COATED ORAL EVERY 8 HOURS
Refills: 0 | Status: DISCONTINUED | OUTPATIENT
Start: 2023-11-14 | End: 2023-11-14

## 2023-11-14 RX ORDER — LIDOCAINE 4 G/100G
1 CREAM TOPICAL ONCE
Refills: 0 | Status: COMPLETED | OUTPATIENT
Start: 2023-11-14 | End: 2023-11-14

## 2023-11-14 RX ORDER — ACETAMINOPHEN 500 MG
650 TABLET ORAL EVERY 6 HOURS
Refills: 0 | Status: DISCONTINUED | OUTPATIENT
Start: 2023-11-14 | End: 2023-11-17

## 2023-11-14 RX ADMIN — SODIUM CHLORIDE 1000 MILLILITER(S): 9 INJECTION INTRAMUSCULAR; INTRAVENOUS; SUBCUTANEOUS at 02:52

## 2023-11-14 RX ADMIN — SERTRALINE 100 MILLIGRAM(S): 25 TABLET, FILM COATED ORAL at 13:20

## 2023-11-14 RX ADMIN — LOSARTAN POTASSIUM 50 MILLIGRAM(S): 100 TABLET, FILM COATED ORAL at 10:06

## 2023-11-14 RX ADMIN — Medication 15 MILLIGRAM(S): at 04:10

## 2023-11-14 RX ADMIN — ATENOLOL 25 MILLIGRAM(S): 25 TABLET ORAL at 11:36

## 2023-11-14 RX ADMIN — SENNA PLUS 2 TABLET(S): 8.6 TABLET ORAL at 21:15

## 2023-11-14 RX ADMIN — Medication 20 MILLIGRAM(S): at 10:05

## 2023-11-14 RX ADMIN — LIDOCAINE 1 PATCH: 4 CREAM TOPICAL at 09:27

## 2023-11-14 RX ADMIN — Medication 650 MILLIGRAM(S): at 09:28

## 2023-11-14 RX ADMIN — Medication 650 MILLIGRAM(S): at 10:30

## 2023-11-14 RX ADMIN — Medication 15 MILLIGRAM(S): at 03:54

## 2023-11-14 RX ADMIN — POLYETHYLENE GLYCOL 3350 17 GRAM(S): 17 POWDER, FOR SOLUTION ORAL at 13:20

## 2023-11-14 RX ADMIN — SIMVASTATIN 10 MILLIGRAM(S): 20 TABLET, FILM COATED ORAL at 21:16

## 2023-11-14 RX ADMIN — Medication 10 MILLIGRAM(S): at 15:33

## 2023-11-14 RX ADMIN — LIDOCAINE 1 PATCH: 4 CREAM TOPICAL at 21:12

## 2023-11-14 RX ADMIN — GABAPENTIN 300 MILLIGRAM(S): 400 CAPSULE ORAL at 10:05

## 2023-11-14 RX ADMIN — LOSARTAN POTASSIUM 50 MILLIGRAM(S): 100 TABLET, FILM COATED ORAL at 13:20

## 2023-11-14 RX ADMIN — SODIUM CHLORIDE 1000 MILLILITER(S): 9 INJECTION INTRAMUSCULAR; INTRAVENOUS; SUBCUTANEOUS at 03:52

## 2023-11-14 RX ADMIN — POTASSIUM PHOSPHATE, MONOBASIC POTASSIUM PHOSPHATE, DIBASIC 62.5 MILLIMOLE(S): 236; 224 INJECTION, SOLUTION INTRAVENOUS at 14:54

## 2023-11-14 RX ADMIN — IRON SUCROSE 176.67 MILLIGRAM(S): 20 INJECTION, SOLUTION INTRAVENOUS at 17:34

## 2023-11-14 RX ADMIN — LIDOCAINE 1 PATCH: 4 CREAM TOPICAL at 19:51

## 2023-11-14 RX ADMIN — Medication 3 MILLILITER(S): at 02:52

## 2023-11-14 RX ADMIN — Medication 10 MILLIGRAM(S): at 06:25

## 2023-11-14 RX ADMIN — PANTOPRAZOLE SODIUM 40 MILLIGRAM(S): 20 TABLET, DELAYED RELEASE ORAL at 13:19

## 2023-11-14 RX ADMIN — Medication 20 MILLIGRAM(S): at 17:34

## 2023-11-14 RX ADMIN — Medication 10 MILLIGRAM(S): at 03:00

## 2023-11-14 RX ADMIN — QUETIAPINE FUMARATE 100 MILLIGRAM(S): 200 TABLET, FILM COATED ORAL at 21:16

## 2023-11-14 NOTE — ED ADULT NURSE REASSESSMENT NOTE - NS ED NURSE REASSESS COMMENT FT1
Patient to receive blood transfusion. Labs reviewed. Patient consent in chart. Patient and son educated on possible signs of blood transfusion and informed to notify staff if patient were to develop any itching, difficulty breathing, new backache, chills, rigors/shaking, hives. Patient with two large bore IV access sites. Patient to receive VS per policy and procedure. Blood product checked with second RN Maurizio. Will continue to monitor with frequent VS and for possible transfusion reactions.

## 2023-11-14 NOTE — H&P ADULT - PROBLEM SELECTOR PLAN 7
F: s/p 1L NS in ED  E: replete as needed  N: NPO   GI: IV PPI BID  DVT: hold iso anemia of unknown etiology  Code:   Dispo: 7[facility]

## 2023-11-14 NOTE — PROVIDER CONTACT NOTE (CHANGE IN STATUS NOTIFICATION) - ASSESSMENT
Patient has elevated BP. Patient is on 3L NC. Patient is lethargic from baseline. Unable to comprehend patients speech.

## 2023-11-14 NOTE — H&P ADULT - PROBLEM SELECTOR PLAN 6
- tylenol 650 q6 PRN  - c/w gabapentin 300 BID #chronic back pain  h/o b/ knee and L shoulder joint replacement.     - tylenol 650 q6 PRN  - c/w gabapentin 300 BID #chronic back pain  h/o b/ knee and L shoulder joint replacement. Noted to have difficulty w/ ambulation over last 2 weeks. Typically ambulates w/ walker short distance w/ PT.     - tylenol 650 q6 PRN  - c/w gabapentin 300 BID  - PT consult

## 2023-11-14 NOTE — ED ADULT NURSE REASSESSMENT NOTE - NS ED NURSE REASSESS COMMENT FT1
Received patient report from ADDIS Saavedra . Patient in stretcher, currently with ongoing blood transfusion. Vital signs as noted in flowsheet.  Patient complains of back pain. All needs attended. Patient currently placed on cardiac monitor. Fall risk precautions maintained. Purposeful proactive hourly rounding in progress. Received patient report from ADDIS Saavedra . Patient in stretcher, currently with ongoing blood transfusion and placed on high flow oxygen. Vital signs as noted in flowsheet.  Patient complains of back pain. All needs attended. Patient currently placed on cardiac monitor. Fall risk precautions maintained. Purposeful proactive hourly rounding in progress.

## 2023-11-14 NOTE — PROVIDER CONTACT NOTE (CHANGE IN STATUS NOTIFICATION) - BACKGROUND
79 y/o female PMH dementia, HTN, HLD, COPD, OA. Liset came into ER for hgb of 4.5. 2 PRBC given over night.

## 2023-11-14 NOTE — H&P ADULT - NSHPLABSRESULTS_GEN_ALL_CORE
LABS:                        4.5    8.15  )-----------( 438      ( 14 Nov 2023 02:02 )             17.9     11-14    145  |  109<H>  |  14  ----------------------------<  135<H>  3.9   |  26  |  0.89    Ca    9.4      14 Nov 2023 02:02  Mg     2.1     11-14    TPro  7.0  /  Alb  4.1  /  TBili  0.4  /  DBili  x   /  AST  20  /  ALT  13  /  AlkPhos  110  11-14    PT/INR - ( 14 Nov 2023 02:02 )   PT: 11.9 sec;   INR: 1.05          PTT - ( 14 Nov 2023 02:02 )  PTT:25.7 sec  Fingerstick  glucose: POCT Blood Glucose.: 127 mg/dL (14 Nov 2023 02:02)        RADIOLOGY & ADDITIONAL TESTS: Reviewed.              CT Head No Cont:   ACC: [id number] EXAM:  CT BRAIN   ORDERED BY: ROBERTO PIMENTEL     PROCEDURE DATE:  11/14/2023          INTERPRETATION:  CT OF THE HEAD WITHOUT CONTRAST    INDICATION:  ams    TECHNIQUE: An axial noncontrast CT scan of the head was performed.   Sagittal and coronal reformatted images were also obtained.    CONTRAST:  None    COMPARISON:  None    FINDINGS:  There is ventricular and sulcal prominence consistent with generalized   age related cerebral volume loss. There is no hydrocephalus. The basal   cisterns are patent. There is no focal mass effect or midline shift.   There are no extra-axial collections or intraparenchymal hemorrhage.    There are scattered regions of hypodensity involving the periventricular   and subcortical white matter consistent with chronic microvascular   ischemic changes. There is no evidence of acute transcortical territorial   infarction.    The patient has had prior bilateral ocular lens replacement. The mastoids   and paranasal sinuses are well aerated. The calvaria is intact.    IMPRESSION:  No hydrocephalus, midline shift, acute intracranial hemorrhage or   demarcated territorial infarct.    --- End of Report ---            NEL ALFONSO MD; Attending Radiologist  This document has been electronically signed. Nov 14 2023  3:05AM (11-14-23 @ 02:56)

## 2023-11-14 NOTE — ED PROVIDER NOTE - CLINICAL SUMMARY MEDICAL DECISION MAKING FREE TEXT BOX
78-year-old female with likely symptomatic anemia and mild COPD exacerbation we will plan for high flow nasal cannula for PEEP and respiratory support repeat blood work order PRBCs for transfusion cardiac enzymes CBC CMP close monitoring reassessment    EKGNormal sinus rhythm at 65 nonspecific T wave inversions leftward axis prolonged QT with normal QTc low voltage QRS nonspecific T wave inversion no STEMI    Hemoglobin confirmed to be 4.5 troponin is negative patient improved on high flow nasal cannula  ICU consulted for tele 78-year-old female with likely symptomatic anemia and mild COPD exacerbation we will plan for high flow nasal cannula for PEEP and respiratory support repeat blood work order PRBCs for transfusion cardiac enzymes CBC CMP close monitoring reassessment    EKGNormal sinus rhythm at 65 nonspecific T wave inversions leftward axis prolonged QT with normal QTc low voltage QRS nonspecific T wave inversion no STEMI    Hemoglobin confirmed to be 4.5 troponin is negative patient improved on high flow nasal cannula  ICU consulted for tele  Admitted

## 2023-11-14 NOTE — H&P ADULT - NSICDXPASTMEDICALHX_GEN_ALL_CORE_FT
PAST MEDICAL HISTORY:  Aortic valve disease sclerotic aortic valve    HTN (hypertension)     OA (osteoarthritis)     Spondylisthesis      1.98

## 2023-11-14 NOTE — ED PROVIDER NOTE - CARE PLAN
Principal Discharge DX:	Symptomatic anemia  Secondary Diagnosis:	Acute respiratory failure with hypoxia   1

## 2023-11-14 NOTE — PROGRESS NOTE ADULT - PROBLEM SELECTOR PROBLEM 2
Advised patient of Dr. Dennis palmer. Patient verbalized understanding and had no further questions. HTN (hypertension)

## 2023-11-14 NOTE — H&P ADULT - PROBLEM SELECTOR PLAN 3
- c/w sertraline 50 - c/w sertraline 50 qd  - c/w Seroquel 100 qd Followed by Dr. Ronel Alvarado. Noted to have severe behavioral disturbances (paranoid delusions, hallucinations). Per son, recent MRI outpt. showed signs of vascular dementia. AAOx0-1 at baseline. AAOx2 on exam.     - c/w sertraline 50 qd  - c/w Seroquel 100 qd

## 2023-11-14 NOTE — PROVIDER CONTACT NOTE (CHANGE IN STATUS NOTIFICATION) - SITUATION
Patient using abdominal muscles to breathing. Patient grunting. Patient BP elevated to 180s systolic.

## 2023-11-14 NOTE — ED ADULT TRIAGE NOTE - CHIEF COMPLAINT QUOTE
Patient to ED by son after getting call x 1 hour ago by PCP for abnormally low hgb, unable to state number. Hx of dementia, son states patient has worsened mentation today. Patient is pale and lethargic in triage. NAD.

## 2023-11-14 NOTE — CONSULT NOTE ADULT - ASSESSMENT
INCOMPLETE Patient is a 77 yo F with a past medical Hx of vascular dementia, HTN, HLD, COPD, OA, who presents with two weeks of weakness, pallor found to be anemic on outpatient labs, short of breath on admission requiring HFNC. ICU consulted for symptomatic anemia requiring HFNC.     NEURO  #Vascular dementia  Baseline AO to self, recognizes son  - c/w Seroquel 50 mg PO QD  - c/w Sertraline 100 mg PO QD  - c/w Simvastatin 10 mg PO QD  - hold ASA    PULM  - maintain O2 > 94  - wean O2 as tolerated    CV  #HTN  - c/w Losartan 100 mg PO QD    GI  - NPO  - IV PPI 40 mg BID  - consider GI in AM    HEME  #Symptomatic Anemia  No active signs of bleeding. S/P 2 units of packed RBC.  - send iron studies, TSH, B12, folate, reticulocyte count, LDH, Radha, haptoglobin  - maintain active T/S  - Consider CT A/P for r/o RP bleed    PPX  E: PRN  N: NPO pending possible GI consult  DVT: SCD  C: Full Code, pending further GOC discussion  D: 7 [place] medical telemetry

## 2023-11-14 NOTE — PROGRESS NOTE ADULT - PROBLEM SELECTOR PLAN 6
#chronic back pain  h/o b/ knee and L shoulder joint replacement. Noted to have difficulty w/ ambulation over last 2 weeks. Typically ambulates w/ walker short distance w/ PT.   Plan:  - tylenol 650 q6 PRN  - c/w gabapentin 300 BID  - PT consult

## 2023-11-14 NOTE — PROGRESS NOTE ADULT - ATTENDING COMMENTS
No evidence of bleeding. Hb came up appropriately and pt given lasix 20mg as she was never hypovolemic and was plasma volume compensated on admission. No evidence of bleeding nor source of bleeding. No signs of retroperitoneal bleed. GI to see.

## 2023-11-14 NOTE — H&P ADULT - PROBLEM SELECTOR PLAN 2
H/o HTN, takes losartan 100 qd. Found to have /66 in ED that increased to 211/105, likely 2/2 pain. Received hydral 10 and toradol 15; BP improved to 140's.    - c/w home losartan 100mg  - pain control H/o HTN, takes losartan 100 qd. Found to have /66 in ED that increased to 211/105, likely 2/2 pain. Received hydral 10 and toradol 15; BP improved to 140's.    - c/w home losartan 100mg  - pain control  - cautious fluid use given h/o PO lasix use of unknown reason

## 2023-11-14 NOTE — H&P ADULT - ASSESSMENT
78F PMHx vascular dementia, HTN, HLD, COPD, OA, presenting with symptomatic anemia (SOB, fatigued, pale, dizzy) x 2 weeks, was seen by PCP and advised to come to ED after labs showed Hgb 4.6, being admitted to [facility] for management of anemia and medical workup.

## 2023-11-14 NOTE — PROGRESS NOTE ADULT - SUBJECTIVE AND OBJECTIVE BOX
***** TRANSFER FROM [facility] TO [facility] *****    HOSPITAL COURSE:  78F PMHx vascular dementia, HTN, HLD, OA s/p b/l knee, hip replacements presenting to the hospital due to outpatient labs showing Hgb of 4.5. The patient is unable to provide history due to dementia, therefore the patient's son is the main source of history. The patient has been feeling SOB, weakness and worsening demential for the past 2-3 weeks. No recent dark stools or bright blood noted in stools. On arrival, Hgb was 4.4 for which the patient received 2 units of pRBC. The patient was noted with crackles on exam in AM and was given Lasix for Flash Pulmonary edema. Repeat Hgb showed proper improvement (6.6). The patient was found with hypoxia on arrival and was placed on HFNC which was weaned to 6L NC in AM. The patient was also noted with Hypertension for which she was given Hydralazine in the ED, losartan in the AM and is also s/p Lasix.     OVERNIGHT EVENTS:   Admitted to [facility]     SUBJECTIVE:  The patient was seen and examined at the bedside this AM. Son at the bedside. The patient has a Hx of dementia, therefore her history is limited. She states that she is okay and had no concerns, however, the patient's son states that she usually says that she is not in pain even when she is. He noticed the change in speech about 1 month ago and her mental status around the same time started to deteriorate. The patient's son states that the patient has a 24hr aid that usually cleans her and takes care of her. The son himself noticed dark stools about 2-3 weeks ago but states that for the past 2 weeks the patient has not been eating well therefore she has not been having many BM. Today, the patient's son states that he noticed the patient to have better mentation, to be more awake and responsive compared to the past couple of weeks.     VITAL SIGNS:  Vital Signs Last 24 Hrs  T(C): 36.8 (14 Nov 2023 09:32), Max: 36.9 (14 Nov 2023 02:13)  T(F): 98.2 (14 Nov 2023 09:32), Max: 98.4 (14 Nov 2023 02:13)  HR: 72 (14 Nov 2023 09:00) (60 - 97)  BP: 187/85 (14 Nov 2023 09:00) (139/63 - 211/105)  BP(mean): 122 (14 Nov 2023 09:00) (111 - 122)  RR: 20 (14 Nov 2023 09:00) (18 - 25)  SpO2: 100% (14 Nov 2023 09:00) (98% - 100%)    Parameters below as of 14 Nov 2023 09:00  Patient On (Oxygen Delivery Method): nasal cannula  O2 Flow (L/min): 3      PHYSICAL EXAM:  General: NAD; Danish speaking, slurred speech that is not new, able to answer questions and cooperating to exam.   HEENT: PERRL; EOMI; MMM  Neck: supple; no JVD  Cardiac: RRR; +S1/S2, no murmurs  Pulm: crackles in the b/l lower lobes, POCUS at bedside showing B-lines on bilateral lung fields, no wheezing heard  GI: soft, NT/ND, +BS  Extremities: WWP; no edema, clubbing or cyanosis  Vasc: 2+ radial, DP pulses B/L  Neuro: AAOx2; no focal deficits    MEDICATIONS:  MEDICATIONS  (STANDING):  atenolol  Tablet 25 milliGRAM(s) Oral daily  gabapentin 300 milliGRAM(s) Oral daily  losartan 50 milliGRAM(s) Oral every 24 hours  pantoprazole  Injectable 40 milliGRAM(s) IV Push every 12 hours  polyethylene glycol 3350 17 Gram(s) Oral daily  potassium phosphate IVPB 15 milliMole(s) IV Intermittent once  senna 2 Tablet(s) Oral at bedtime  sertraline 100 milliGRAM(s) Oral daily  simvastatin 10 milliGRAM(s) Oral at bedtime    MEDICATIONS  (PRN):  acetaminophen     Tablet .. 650 milliGRAM(s) Oral every 6 hours PRN Temp greater or equal to 38C (100.4F), Mild Pain (1 - 3)      ALLERGIES:  Allergies    No Known Allergies    Intolerances        LABS:                        6.6    7.10  )-----------( 371      ( 14 Nov 2023 10:05 )             24.7     11-14    144  |  111<H>  |  14  ----------------------------<  104<H>  4.0   |  24  |  0.80    Ca    9.0      14 Nov 2023 10:05  Phos  2.2     11-14  Mg     2.0     11-14    TPro  6.8  /  Alb  3.6  /  TBili  0.4  /  DBili  <0.2  /  AST  20  /  ALT  14  /  AlkPhos  99  11-14    PT/INR - ( 14 Nov 2023 02:02 )   PT: 11.9 sec;   INR: 1.05          PTT - ( 14 Nov 2023 02:02 )  PTT:25.7 sec    RADIOLOGY & ADDITIONAL TESTS: Reviewed. HOSPITAL COURSE:  78F PMHx vascular dementia, HTN, HLD, OA s/p b/l knee, hip replacements presenting to the hospital due to outpatient labs showing Hgb of 4.5. The patient is unable to provide history due to dementia, therefore the patient's son is the main source of history. The patient has been feeling SOB, weakness and worsening demential for the past 2-3 weeks. No recent dark stools or bright blood noted in stools. On arrival, Hgb was 4.4 for which the patient received 2 units of pRBC. The patient was noted with crackles on exam in AM and was given Lasix for Flash Pulmonary edema. Repeat Hgb showed proper improvement (6.6). The patient was found with hypoxia on arrival and was placed on HFNC which was weaned to 6L NC in AM. The patient was also noted with Hypertension for which she was given Hydralazine in the ED, losartan in the AM and is also s/p Lasix.     OVERNIGHT EVENTS:   Admitted to [facility]     SUBJECTIVE:  The patient was seen and examined at the bedside this AM. Son at the bedside. The patient has a Hx of dementia, therefore her history is limited. She states that she is okay and had no concerns, however, the patient's son states that she usually says that she is not in pain even when she is. He noticed the change in speech about 1 month ago and her mental status around the same time started to deteriorate. The patient's son states that the patient has a 24hr aid that usually cleans her and takes care of her. The son himself noticed dark stools about 2-3 weeks ago but states that for the past 2 weeks the patient has not been eating well therefore she has not been having many BM. Today, the patient's son states that he noticed the patient to have better mentation, to be more awake and responsive compared to the past couple of weeks.     VITAL SIGNS:  Vital Signs Last 24 Hrs  T(C): 36.8 (14 Nov 2023 09:32), Max: 36.9 (14 Nov 2023 02:13)  T(F): 98.2 (14 Nov 2023 09:32), Max: 98.4 (14 Nov 2023 02:13)  HR: 72 (14 Nov 2023 09:00) (60 - 97)  BP: 187/85 (14 Nov 2023 09:00) (139/63 - 211/105)  BP(mean): 122 (14 Nov 2023 09:00) (111 - 122)  RR: 20 (14 Nov 2023 09:00) (18 - 25)  SpO2: 100% (14 Nov 2023 09:00) (98% - 100%)    Parameters below as of 14 Nov 2023 09:00  Patient On (Oxygen Delivery Method): nasal cannula  O2 Flow (L/min): 3      PHYSICAL EXAM:  General: NAD; Indonesian speaking, slurred speech that is not new, able to answer questions and cooperating to exam.   HEENT: PERRL; EOMI; MMM  Neck: supple; no JVD  Cardiac: RRR; +S1/S2, no murmurs  Pulm: crackles in the b/l lower lobes, POCUS at bedside showing B-lines on bilateral lung fields, no wheezing heard  GI: soft, NT/ND, +BS  Extremities: WWP; no edema, clubbing or cyanosis  Vasc: 2+ radial, DP pulses B/L  Neuro: AAOx2; no focal deficits    MEDICATIONS:  MEDICATIONS  (STANDING):  atenolol  Tablet 25 milliGRAM(s) Oral daily  gabapentin 300 milliGRAM(s) Oral daily  losartan 50 milliGRAM(s) Oral every 24 hours  pantoprazole  Injectable 40 milliGRAM(s) IV Push every 12 hours  polyethylene glycol 3350 17 Gram(s) Oral daily  potassium phosphate IVPB 15 milliMole(s) IV Intermittent once  senna 2 Tablet(s) Oral at bedtime  sertraline 100 milliGRAM(s) Oral daily  simvastatin 10 milliGRAM(s) Oral at bedtime    MEDICATIONS  (PRN):  acetaminophen     Tablet .. 650 milliGRAM(s) Oral every 6 hours PRN Temp greater or equal to 38C (100.4F), Mild Pain (1 - 3)      ALLERGIES:  Allergies    No Known Allergies    Intolerances        LABS:                        6.6    7.10  )-----------( 371      ( 14 Nov 2023 10:05 )             24.7     11-14    144  |  111<H>  |  14  ----------------------------<  104<H>  4.0   |  24  |  0.80    Ca    9.0      14 Nov 2023 10:05  Phos  2.2     11-14  Mg     2.0     11-14    TPro  6.8  /  Alb  3.6  /  TBili  0.4  /  DBili  <0.2  /  AST  20  /  ALT  14  /  AlkPhos  99  11-14    PT/INR - ( 14 Nov 2023 02:02 )   PT: 11.9 sec;   INR: 1.05          PTT - ( 14 Nov 2023 02:02 )  PTT:25.7 sec    RADIOLOGY & ADDITIONAL TESTS: Reviewed.

## 2023-11-14 NOTE — ED PROVIDER NOTE - OBJECTIVE STATEMENT
78-year-old female with past medical history of vascular dementia COPD not on home O2 here today for abnormal labs.  Patient has been having shortness of breath over the past 2 weeks as per her son this patient is a limited historian and today son was called after outpatient labs showed a "low blood count".  After chart review found to have hemoglobin of 4.6 on outpatient lab work.  Patient denies chest pain or abdominal pain admits to dizziness and nausea.  Also complaining of shortness of breath.  History is limited as patient is a poor historian.  Son denies any vomiting or melanotic Or red stools.Patient is not anticoagulated

## 2023-11-14 NOTE — ED PROVIDER NOTE - CRITICAL CARE ATTENDING CONTRIBUTION TO CARE
Critical Care Procedure Note  Authorized and Performed by: MELISSA MANUEL   Total critical care time: Approximately 40 minutes    Due to a high probability of clinically significant, life threatening deterioration, the patient required my highest level of preparedness to intervene emergently and I personally spent this critical care time directly and personally managing the patient. This critical care time included obtaining a history; examining the patient; pulse oximetry; ordering and review of studies; arranging urgent treatment with development of a management plan; evaluation of patient's response to treatment; frequent reassessment; and, discussions with other providers.    This critical care time was performed to assess and manage the high probability of imminent, life-threatening deterioration that could result in multi-organ failure. It was exclusive of separately billable procedures and treating other patients and teaching time.    Please see MDM section and the rest of the note for further information on patient assessment and treatment

## 2023-11-14 NOTE — CONSULT NOTE ADULT - ATTENDING COMMENTS
Patient seen and discussed with fellow plan as noted above
Lynsey Lamb  [id number]  [place]  This is a 77 y/o female with vascular dementia, HTN, on aspirin who has been weaker and pale in the last 2 wks.  Today she has increased WOB and was brought to the ED, placed on HFNC with improvement.  She was given 1L IVF, is being transfused PRBC, hgb 4.5g/dL, CT head with no acute changes, guaiac  (-), EKG, NSR.  -Generalized weakness  -shortness of breath  -Symptomatic anemia  -vascular dementia  -HTN  >Tylenol prn pain  >Keep SO2 above 94%; titrate HFNC  >hold antihypertensives  >hold aspirin  >maintain 2 Large bore PIVs  >hold IVF' transfuse 3U PRBC  >anemia w/u  Please see the resident's note above for the rest of the details.

## 2023-11-14 NOTE — ED ADULT NURSE NOTE - NSFALLHARMRISKINTERV_ED_ALL_ED

## 2023-11-14 NOTE — ED ADULT NURSE NOTE - OBJECTIVE STATEMENT
Received via wheelchair brought in by son with chief complaints of worsening mental status and low hemoglobin. Son states "I received a call from her PCP telling us that her hemoglobin that was taken today is abnormal. I don't know the number but I know its bad. She is also more confused and sleepy that usual."     Patient AOX0, incoherent speech. +Jaundice +Tachypneic. Pt placed on high flow. No obvious trauma/injury/deformity noted. Son oriented to ED area. All needs attended. POC reviewed. Placed on continuos cardiac monitoring. Fall risk precautions maintained. Purposeful proactive hourly rounding in progress.

## 2023-11-14 NOTE — PROGRESS NOTE ADULT - PROBLEM SELECTOR PLAN 3
Followed by Dr. Ronel Alvarado. Noted to have severe behavioral disturbances (paranoid delusions, hallucinations). Per son, recent MRI outpt. showed signs of vascular dementia. AAOx0-1 at baseline. AAOx2 on exam.   Plan:  - c/w sertraline 50 qd  - c/w Seroquel 100 qd

## 2023-11-14 NOTE — H&P ADULT - NSHPSOCIALHISTORY_GEN_ALL_CORE
Lives w/ son and son-in-law. Smoking hx 1 pack/week x 20 years. Remote heavy EtOH hx, quit at age 49. Lives w/ son and son-in-law.   Smoking hx 1 pack/week x 20 years.   Remote heavy EtOH hx, quit at age 49.

## 2023-11-14 NOTE — H&P ADULT - PROBLEM SELECTOR PLAN 1
#microcytic anemia    - start IV PPI BID   - repeat FOBT     - s/p 2u pRBC; goal Hgb >7  - maintain active T&S, 2 large bore IV  - f/u iron studies, hemolysis labs, b12, folate, retic count  - consider CTAP w/wo IV contrast   - consider malignancy workup #microcytic anemia    - s/p 2u pRBC; goal Hgb >7  - start IV PPI BID   - repeat FOBT   - maintain active T&S, 2 large bore IV  - f/u iron studies, hemolysis labs, b12, folate, retic count  - consider CTAP w/wo IV contrast   - consider malignancy workup #microcytic anemia  Hgb 4.5 on admission. MCV 70. No active signs of bleeding. No active signs of bleeding. No tachycardia, hypotension. ASA use, no current alcohol use. BUN/Cr wnl. Colonoscopy in 2018 w/ benign findings.     - s/p 2u pRBC; goal Hgb >7   - start IV PPI BID   - repeat FOBT   - maintain active T&S, 2 large bore IV  - f/u iron studies, hemolysis labs, b12, folate, retic count  - consider CTAP w/wo IV contrast   - consider malignancy workup #microcytic anemia  Hgb 4.5 on admission. MCV 70. No active signs of bleeding. No active signs of bleeding. No tachycardia, hypotension. ASA use, no current alcohol use. BUN/Cr wnl. Colonoscopy in 2018 w/ benign findings. Placed on HFNC for increased wob, no hypoxia.      - s/p 2u pRBC; goal Hgb >7   - wean HFNC as tolerated   - maintain active T&S, 2 large bore IV  - f/u iron studies, hemolysis labs, b12, folate, retic count  - consider CTAP w/wo IV contrast   - consider malignancy workup

## 2023-11-14 NOTE — ED ADULT NURSE REASSESSMENT NOTE - NS ED NURSE REASSESS COMMENT FT1
Pt with spontaneous eye opening at this time. Moaning and complaining of back pain and difficulty breathing. MD Berman made aware, to order pain medication.

## 2023-11-14 NOTE — H&P ADULT - NSHPPHYSICALEXAM_GEN_ALL_CORE
VITAL SIGNS:  T(C): 36.9 (11-14-23 @ 04:01), Max: 36.9 (11-14-23 @ 02:13)  T(F): 98.4 (11-14-23 @ 04:01), Max: 98.4 (11-14-23 @ 02:13)  HR: 60 (11-14-23 @ 04:01) (60 - 97)  BP: 146/65 (11-14-23 @ 04:01) (139/63 - 211/105)  BP(mean): --  RR: 18 (11-14-23 @ 04:01) (18 - 25)  SpO2: 99% (11-14-23 @ 04:01) (98% - 100%)  Wt(kg): --    PHYSICAL EXAM:  Constitutional: WDWN resting comfortably in bed; NAD  Head: NC/AT  Eyes: PERRL, EOMI, anicteric sclera  ENT: no nasal discharge; uvula midline, no oropharyngeal erythema or exudates; MMM  Neck: supple; no JVD or thyromegaly  Respiratory: CTA B/L; no W/R/R, no retractions  Cardiac: +S1/S2; RRR; no M/R/G; PMI non-displaced  Gastrointestinal: abdomen soft, NT/ND; no rebound or guarding  Back: spine midline, no bony tenderness or step-offs; no CVAT B/L  Extremities: WWP, no clubbing or cyanosis; no peripheral edema  Musculoskeletal: NROM x4; no joint swelling, tenderness or erythema  Vascular: 2+ radial, DP pulses B/L  Dermatologic: skin warm, dry and intact; no rashes, wounds, or scars  Neurologic: AAOx3; CNII-XII grossly intact; no focal deficits  Psychiatric: affect and characteristics of appearance, verbalizations, behaviors are appropriate VITAL SIGNS:  T(C): 36.9 (11-14-23 @ 04:01), Max: 36.9 (11-14-23 @ 02:13)  T(F): 98.4 (11-14-23 @ 04:01), Max: 98.4 (11-14-23 @ 02:13)  HR: 60 (11-14-23 @ 04:01) (60 - 97)  BP: 146/65 (11-14-23 @ 04:01) (139/63 - 211/105)  BP(mean): --  RR: 18 (11-14-23 @ 04:01) (18 - 25)  SpO2: 99% (11-14-23 @ 04:01) (98% - 100%)  Wt(kg): --    PHYSICAL EXAM:  Constitutional: WDWN resting comfortably in bed; NAD  Head: NC/AT  Eyes: PERRL, EOMI, anicteric sclera  ENT: no nasal discharge; uvula midline, no oropharyngeal erythema or exudates; MMM  Neck: supple; no JVD or thyromegaly  Respiratory: CTA B/L; no W/R/R, low tidal volumes   Cardiac: +S1/S2; RRR; no M/R/G; PMI non-displaced  Gastrointestinal: abdomen soft, NT/ND; no rebound or guarding  Back: spine midline, no bony tenderness or step-offs; no CVAT B/L  Extremities: WWP, no clubbing or cyanosis; no peripheral edema  Musculoskeletal: NROM x4; no joint swelling, tenderness or erythema  Vascular: 2+ radial, DP pulses B/L  Dermatologic: skin warm, dry and intact; no rashes, wounds, or scars  Neurologic: AAOx2; no focal deficits  Psychiatric: affect and characteristics of appearance, verbalizations, behaviors are appropriate VITAL SIGNS:  T(C): 36.9 (11-14-23 @ 04:01), Max: 36.9 (11-14-23 @ 02:13)  T(F): 98.4 (11-14-23 @ 04:01), Max: 98.4 (11-14-23 @ 02:13)  HR: 60 (11-14-23 @ 04:01) (60 - 97)  BP: 146/65 (11-14-23 @ 04:01) (139/63 - 211/105)  BP(mean): --  RR: 18 (11-14-23 @ 04:01) (18 - 25)  SpO2: 99% (11-14-23 @ 04:01) (98% - 100%)  Wt(kg): --    PHYSICAL EXAM:  Constitutional: resting comfortably in bed; NAD  Head: NC/AT  Eyes: PERRL, EOMI, anicteric sclera, pale conjunctiva   ENT: on HFNC, no nasal discharge; dry MM  Neck: supple  Respiratory: distant breath sounds, limited exam. CTA B/L; no W/R/R  Cardiac: +S1/S2; RRR; no M/R/G; PMI non-displaced  Gastrointestinal: abdomen obese, soft, NT/ND; no rebound or guarding  Extremities: WWP, no clubbing or cyanosis; no peripheral edema  Musculoskeletal: NROM x4; no joint swelling, tenderness or erythema  Vascular: 2+ radial, DP pulses B/L  Dermatologic: skin warm, dry and intact; no rashes, wounds, or scars  Neurologic: AAOx2; no focal deficits  Psychiatric: affect and characteristics of appearance, verbalizations, behaviors are appropriate

## 2023-11-14 NOTE — SWALLOW BEDSIDE ASSESSMENT ADULT - SWALLOW EVAL: RECOMMENDED FEEDING/EATING TECHNIQUES
Careful hand-under-hand feeding, Discontinue feeding if nonverbal signs of refusal/check mouth frequently for oral residue/pocketing/crush medication (when feasible)/maintain upright posture during/after eating for 30 mins/no straws/oral hygiene/position upright (90 degrees)/small sips/bites

## 2023-11-14 NOTE — SWALLOW BEDSIDE ASSESSMENT ADULT - SLP PERTINENT HISTORY OF CURRENT PROBLEM
PMHx significant for vascular dementia, COPD, OA. Presented to hospital with symptomatic anemia x2 weeks, as seen by PCP and advised to come to ED after labs showed Hgb 4.6, being admitted to [facility] for management of anemia and medical workup.

## 2023-11-14 NOTE — SWALLOW BEDSIDE ASSESSMENT ADULT - SLP GENERAL OBSERVATIONS
Sleeping in bed, awoke to voice, receiving 3L supplemental O2 via NC, A&Ox2. Son at bedside who assisted in providing pertinent case hx information. Pt's son reported a rapid decline in pt's overall presentation over the last month. Pt's PO intake has decreased by ~80%. He described her swallowing as slower overall. Rare coughing/choking. Current oral diet consists of soft and bite solids and thin liquids.

## 2023-11-14 NOTE — PROVIDER CONTACT NOTE (CRITICAL VALUE NOTIFICATION) - ACTION/TREATMENT ORDERED:
Work up inprogress. Awaiting type and screen result for blood to be given. Blood transfusion consent in chart.
50 mG Losartan PO ordered.

## 2023-11-14 NOTE — H&P ADULT - NSICDXPASTSURGICALHX_GEN_ALL_CORE_FT
PAST SURGICAL HISTORY:  History of left hip replacement     History of left knee replacement     History of right hip replacement     History of total right knee replacement     Surgery, elective left shoulder replacement

## 2023-11-14 NOTE — PROGRESS NOTE ADULT - PROBLEM SELECTOR PLAN 2
H/o HTN, takes losartan 100 qd. Found to have /66 in ED that increased to 211/105, likely 2/2 pain. Received hydral 10 and toradol 15; BP improved to 140's. Now s/p home dose losartan and Lasix for Flush Pulmonary Edema   Plan:  - c/w home losartan 100mg  - pain control  - cautious fluid use given h/o PO lasix use of unknown reason

## 2023-11-14 NOTE — SWALLOW BEDSIDE ASSESSMENT ADULT - SWALLOW EVAL: DIAGNOSIS
Mild oral phase deficits but without any clinical concerns for penetration/aspiration. However, pt is at an increased risk for aspiration and its negative sequela given poor safety awareness, limited mobility, and dependence on others for feeding and oral hygiene. Strict aspiration precautions advised.

## 2023-11-14 NOTE — PROVIDER CONTACT NOTE (CRITICAL VALUE NOTIFICATION) - SITUATION
Received a call from the lab with above mentioned critical result.
77 y/o female. Came in for anemia after PCP visit. Blood pressure in the 200s. Recycled BP to 180s systolic.

## 2023-11-14 NOTE — H&P ADULT - HISTORY OF PRESENT ILLNESS
78F PMHx vascular dementia, HTN, HLD, COPD, OA, presenting with SOB x2 weeks and abnormal blood count from OP labs. Patient is poor historian herself and is accompanied by her son, who states they were called by PCP regarding "low blood count" of Hgb 4.6 and advised to come to ED for evaluation. Per son, patient has also appeared pale and lethargic lately. Patient currently endorsing nausea, dizziness, low back pain, and SOB. Denies any melena, BRBPR, hematemesis, hemoptysis, large ecchymosis, recent falls. Denies use of blood thinners.     In the ED:  Initial vital signs: T: 98.2F, HR: 97, BP: 151/66->211/105, R: 22, SpO2: 98% on RA  Labs: significant for H/H 4.5/17.9, MCV 70.2, , WBC 8.15. FOBT negative.   Imaging:  CTH w/o con: No hydrocephalus, midline shift, acute intracranial hemorrhage or demarcated territorial infarct.  EKG: NSR 65, low voltage, nonspecific TWI   Medications: hydral 10mg IVP, toradol 15mg IVP, 1L NS, duoneb x1, 2u pRBC  Consults: ICU  78F PMHx vascular dementia, HTN, HLD, OA s/p b/l knee, hip replacements, presenting with SOB x2 weeks, along w/ decreased muscle strength, worsening dementia. Pt presented to PCP yesterday to evaluate for infectious causes of worsening dementia but found to have Hgb 4.6 on outpt labs, called by lab to present to ED for further evaluation. Patient is poor historian herself and is accompanied by her son who describes SOB as gasping for air, recently called EMS several days ago but was not brought to ED d/t improvement, never hypoxic. Per son, pt was able to ambulate short distances w/ walker, assistance, but since 2 weeks prior can not ambulate on her own. Also reports pt appearing more pale, lethargic. Of note, pt has been having increasing night time agitation, mumbling w/ gradual increases in seroquel dose to control sx.  Decreased appetite over last month. Currently denies CP, SOB, abdominal pain, N/V/D, melena, hematemesis, hemoptysis. Colonoscopy in 2018 revealed benign pathology w/ instructions to repeat in 5 yrs. Does take aspirin daily, no current alcohol use.       In the ED:  Initial vital signs: T: 98.2F, HR: 97, BP: 151/66->211/105, R: 22, SpO2: 98% on RA  Labs: significant for H/H 4.5/17.9, MCV 70.2, , WBC 8.15. FOBT negative.   Imaging:  CTH w/o con: No hydrocephalus, midline shift, acute intracranial hemorrhage or demarcated territorial infarct.  EKG: NSR 65, low voltage, nonspecific TWI   Medications: hydral 10mg IVP, toradol 15mg IVP, 1L NS, duoneb x1, 2u pRBC  Consults: ICU

## 2023-11-14 NOTE — ED ADULT NURSE REASSESSMENT NOTE - NS ED NURSE REASSESS COMMENT FT1
Skin care done. Moisture barrier cream applied. Primafit in place. Turned and repositioned q 2 and PRN.

## 2023-11-14 NOTE — SWALLOW BEDSIDE ASSESSMENT ADULT - NS SPL SWALLOW CLINIC TRIAL FT
Adequate bolus containment but with slow prolonged bolus processing and reduced mastication. Suspected intermittent delay in A-P transit d/t brief bolus holding. Laryngeal movement consistently palpated. No clinical overt s/s of aspiration appreciated with liquids or solids. Baseline intermittent throat clearing which did not appear to be r/t airway protection deficits.

## 2023-11-14 NOTE — PROGRESS NOTE ADULT - PROBLEM SELECTOR PLAN 1
DDx includes most likelt UGI bleed, vs less likely hemolysis, decrease production  Last colonoscopy in 2018 with recommendation to return in 5 years (this year). No active signs of bleeding, patient's son states that she takes Naproxen and Meloxicam for back pain every other day.   Hgb 4.5 on admission (Microcytic as MCV was 70), s/p 2 units of pRBCs with increase of Hgb to 6.6.   s/p   Plan:  - Will hold further transfusion today i/s/o flash pulmonary edema and the anemia being a chronic issue  - Pan to transfuse 1/2 pRBC on 11/15  - maintain active T&S, 2 large bore IV  - f/u iron studies, hemolysis labs, b12, folate, retic count  - f/u GI consult for possible GI bleed  - f/u Parvovirus B19 PCR DDx includes most likelt UGI bleed, vs less likely hemolysis, decrease production  Last colonoscopy in 2018 with recommendation to return in 5 years (this year). No active signs of bleeding, patient's son states that she takes Naproxen and Meloxicam for back pain every other day.   Hgb 4.5 on admission (Microcytic as MCV was 70), s/p 2 units of pRBCs with increase of Hgb to 6.6.   s/p   Plan:  - Will hold further transfusion today i/s/o flash pulmonary edema and the anemia being a chronic issue  - Plan to transfuse 1/2 pRBC on 11/15  - maintain active T&S, 2 large bore IV  - f/u iron studies, hemolysis labs, b12, folate, retic count  - f/u GI consult for possible GI bleed  - f/u Parvovirus B19 PCR

## 2023-11-14 NOTE — SWALLOW BEDSIDE ASSESSMENT ADULT - ADDITIONAL RECOMMENDATIONS
Family benefitted from education on pathophysiology of dysphagia in dementia and overall poor swallow prognosis. Pt's son with multiple questions re: pt's medical condition and overall prognosis and was referred to the medical team.

## 2023-11-14 NOTE — CONSULT NOTE ADULT - ASSESSMENT
78F PMHx vascular dementia, HTN, HLD, OA s/p b/l knee, hip replacements, presenting with SOB x2 weeks, worsening dementia with Hg 4.6 and 3 days of black stools.    As per pt's son, was Short of breath for past 2 weeks and after was brought to PCP was told Hg at 4.6 and to report to ED. Pt given 2 units of blood, Hg now at 6.6 from 4.5, Hct 24.7. FOBT was negative.   DDx includes upper vs lower GI bleed.    78F PMHx vascular dementia, HTN, HLD, OA s/p b/l knee, hip replacements, presenting with SOB x2 weeks, worsening dementia with Hg 4.6 and 3 days of black stools.    As per pt's son, was Short of breath for past 2 weeks and after was brought to PCP was told Hg at 4.6 and to report to ED. Transfused 2 units of blood, Hg now at 6.6 from 4.5, Hct 24.7. FOBT was negative.   DDx includes upper GI bleed secondary to peptic ulcer disease vs erosive gastritis.       Recommendations:  -Plan for EGD tomorrow 11/15  -NPO at midnight 78F PMHx vascular dementia, HTN, HLD, OA s/p b/l knee, hip replacements, presenting with SOB x2 weeks, worsening dementia with Hg 4.6 and 3 days of black stools.    As per pt's son, was short of breath for past 2 weeks and after was brought to PCP was told Hg at 4.6 and to report to ED. Transfused 2 units of blood, Hg now at 6.6 from 4.5, Hct 24.7.     DDx includes upper GI bleed secondary to peptic ulcer disease vs. erosive gastritis vs. esophagitis. Labs suggestive of MARINA with an iron sat of 4%.     Per son who is patient's HCP, he would want her to have an endoscopic evaluation if it would be of diagnostic benefit.     Recommendations:  - Start pantoprazole 40 mg IV BID   - Trend Hb and maintain active type and screen   - Plan for EGD tomorrow (11/15)   - NPO except medications after midnight   - Repeat CBC after patient receives additional unit for Hb 6.6     GI Team will continue to follow.     Iza Mathis D.O.   Gastroenterology Fellow  Weekday 7am-5pm Pager: [phone]  Weeknights/Weekend/Holiday Coverage: Please call the  for contact info     78F PMHx vascular dementia, HTN, HLD, OA s/p b/l knee, hip replacements, presenting with SOB x2 weeks, worsening dementia with Hg 4.6 and 3 days of black stools.    As per pt's son, was short of breath for past 2 weeks and after was brought to PCP was told Hg at 4.6 and to report to ED. Transfused 2 units of blood, Hg now at 6.6 from 4.5, Hct 24.7.     DDx includes upper GI bleed secondary to peptic ulcer disease vs. erosive gastritis vs. esophagitis. Labs suggestive of MARINA with an iron sat of 4%.     Per son who is patient's HCP, he would want her to have an endoscopic evaluation if it would be of diagnostic benefit.     Recommendations:  - Start pantoprazole 40 mg IV BID   - Trend Hb and maintain active type and screen   - Plan for EGD tomorrow (11/15)   - NPO except medications after midnight   - Repeat CBC after patient receives additional unit for Hb 6.6     Case discussed with Dr. Pereira. GI Team will continue to follow.     Iza Mathis D.O.   Gastroenterology Fellow  Weekday 7am-5pm Pager: [phone]  Weeknights/Weekend/Holiday Coverage: Please call the  for contact info     78F PMHx vascular dementia, HTN, HLD, OA s/p b/l knee, hip replacements, presenting with SOB x2 weeks, worsening dementia with Hg 4.6 and 3 days of black stools.    As per pt's son, was short of breath for past 2 weeks and after was brought to PCP was told Hg at 4.6 and to report to ED. Transfused 2 units of blood, Hg now at 6.6 from 4.5, Hct 24.7.     DDx includes upper GI bleed secondary to peptic ulcer disease vs. erosive gastritis vs. esophagitis. Labs suggestive of MARINA with an iron sat of 4%.     Per son who is patient's HCP, he would want her to have an endoscopic evaluation if it would be of diagnostic benefit.     Recommendations:  - Start pantoprazole 40 mg IV BID   - Trend Hb and maintain active type and screen   - Plan for EGD tomorrow (11/15)   - NPO except medications after midnight   - Ideally Hb >7 on AM labs tomorrow for EGD     Case discussed with Dr. Pereira. GI Team will continue to follow.     Iza Mathis D.O.   Gastroenterology Fellow  Weekday 7am-5pm Pager: [phone]  Weeknights/Weekend/Holiday Coverage: Please call the  for contact info

## 2023-11-14 NOTE — PATIENT PROFILE ADULT - FALL HARM RISK - HARM RISK INTERVENTIONS

## 2023-11-14 NOTE — ED PROVIDER NOTE - PROGRESS NOTE DETAILS
Patient appears improved resting comfortably hemodynamically stable seen by ICU admit to telemetry Patient appears improved resting comfortably hemodynamically stable seen by ICU admit to telemetry, prbc ordered and pt consented, admitted to tele.

## 2023-11-14 NOTE — CONSULT NOTE ADULT - SUBJECTIVE AND OBJECTIVE BOX
INCOMPLETE CCM SERVICE CONSULTATION NOTE    Patient is a 77 yo F with a past medical Hx of vascular dementia, HTN, HLD, COPD, OA, who presents with two weeks of weakness and pallor.    Patient has been increasingly weak and pale the past two weeks, per son, who takes care of her at home. Patient suffers from dementia and is AOx1 to self per son. He says that she also suffers from visual hallucinations. He decided to bring her to their PCP where lab work was performed. PCP contacted the patient and told them to go to the ED because of her low Hb. Upon arrival in the ED she was found to be tachypneic requiring HF NC for support. Per son, there have been no signs of active bleeding including hematochezia, hematemesis, or melena. Son says the he has not noticed any ecchymoses on her body. The patient has no Hx of anemia. The patient takes no AC.        VITAL SIGNS:  ICU Vital Signs Last 24 Hrs  T(C): 36.6 (14 Nov 2023 05:05), Max: 36.9 (14 Nov 2023 02:13)  T(F): 97.9 (14 Nov 2023 05:05), Max: 98.4 (14 Nov 2023 02:13)  HR: 65 (14 Nov 2023 06:50) (60 - 97)  BP: 152/72 (14 Nov 2023 06:50) (139/63 - 211/105)  BP(mean): 111 (14 Nov 2023 05:59) (111 - 111)  ABP: --  ABP(mean): --  RR: 18 (14 Nov 2023 06:50) (18 - 25)  SpO2: 100% (14 Nov 2023 06:50) (98% - 100%)    O2 Parameters below as of 14 Nov 2023 06:50  Patient On (Oxygen Delivery Method): nasal cannula, high flow  O2 Flow (L/min): 50  O2 Concentration (%): 30      CAPILLARY BLOOD GLUCOSE      POCT Blood Glucose.: 127 mg/dL (14 Nov 2023 02:02)      PHYSICAL EXAM:  Constitutional: resting comfortably in bed, NAD  HEENT: NC/AT; PERRL, anicteric sclera; no oropharyngeal erythema or exudates; MMM  Neck: supple, no appreciable JVD  Respiratory: CTA B/L, no W/R/R; respirations appear non-labored, conversive in full sentences  Cardiovascular: +S1/S2, RRR  Gastrointestinal: abdomen soft, NT/ND  Extremities: WWP; no clubbing, cyanosis or edema  Vascular: 2+ radial, femoral, and DP/PT pulses B/L  Dermatologic: skin normal color and turgor; no visible rashes  Neurological:     LABS:                        4.5    8.15  )-----------( 438      ( 14 Nov 2023 02:02 )             17.9     11-14    145  |  109<H>  |  14  ----------------------------<  135<H>  3.9   |  26  |  0.89    Ca    9.4      14 Nov 2023 02:02  Mg     2.1     11-14    TPro  7.0  /  Alb  4.1  /  TBili  0.4  /  DBili  x   /  AST  20  /  ALT  13  /  AlkPhos  110  11-14    PT/INR - ( 14 Nov 2023 02:02 )   PT: 11.9 sec;   INR: 1.05          PTT - ( 14 Nov 2023 02:02 )  PTT:25.7 sec  Lactate, Blood: 0.7 mmol/L (11-14-23 @ 02:02)        Urinalysis Basic - ( 14 Nov 2023 02:02 )    Color: x / Appearance: x / SG: x / pH: x  Gluc: 135 mg/dL / Ketone: x  / Bili: x / Urobili: x   Blood: x / Protein: x / Nitrite: x   Leuk Esterase: x / RBC: x / WBC x   Sq Epi: x / Non Sq Epi: x / Bacteria: x      Blood Gas Profile w/Lytes - Venous: Performed in Lab (11-14-23 @ 02:25)      EKG: Reviewed.    RADIOLOGY & ADDITIONAL TESTS: Reviewed.

## 2023-11-14 NOTE — CONSULT NOTE ADULT - SUBJECTIVE AND OBJECTIVE BOX
HPI:  78F PMHx vascular dementia, HTN, HLD, OA s/p b/l knee, hip replacements, presenting with SOB x2 weeks, worsening dementia, and decreased strength from baseline. History provided by patient's son at bedside. He says pt has been gasping for air and short of breath for the past 2 weeks, recently called EMS several days ago but was not brought to ED due to improvement, never hypoxic. He states she has been unable to move and has had no strength. Before this she was able to stand with assistance and move from bed to recliner. This prompted son to bring pt to her PCP yesterday where she got blood work done. This morning at 1 am he says he received a call from the lab telling him pt's hemoglobin is 4 and to report to the ED. He notes that in June 2023 pt had 1 week of episodes of black stool which resolved without intervention. He states he then noticed 3 days of black stools again 2 weeks ago which he described as tablespoons of black stools. He denies pt complaining of abdominal pain, dizziness after bowel movement, lightheadedness. He reports patient appears more pale and lethargic with decreased activity and mumbling since the beginning of November. Pt last saw PCP in March, was not told she had anemia at that time. She lives at home with a full time aide. Last EGD was many years ago, unsure when. Last colonoscopy in 2018 revealed benign pathology with instructions to repeat in 5 years. Pt takes aspirin daily as well as pantoprazole as instructed. Denies alcohol use. Currently denies CP, SOB, abdominal pain, nausea, vomiting, hematemesis hemoptysis     Allergies    No Known Allergies    Intolerances      Home Medications:  atenolol 25 mg oral tablet: 1 tab(s) orally once a day (at bedtime) (29 Mar 2021 09:10)  gabapentin 300 mg oral capsule: 1 cap(s) orally 2 times a day (29 Mar 2021 09:10)  losartan 100 mg oral tablet: 1 tab(s) orally once a day (29 Mar 2021 09:10)  pantoprazole 40 mg oral delayed release tablet: 1 tab(s) orally once a day (29 Mar 2021 09:10)  sertraline 100 mg oral tablet: 1 tab(s) orally once a day (14 Nov 2023 06:33)  simvastatin 10 mg oral tablet: 1 tab(s) orally once a day (at bedtime) (29 Mar 2021 09:10)  Vitamin D3 25 mcg (1000 intl units) oral tablet: 1 tab(s) orally 2 times a day (14 Nov 2023 06:37)    MEDICATIONS:  MEDICATIONS  (STANDING):  atenolol  Tablet 25 milliGRAM(s) Oral daily  gabapentin 300 milliGRAM(s) Oral daily  losartan 50 milliGRAM(s) Oral every 24 hours  pantoprazole  Injectable 40 milliGRAM(s) IV Push every 12 hours  polyethylene glycol 3350 17 Gram(s) Oral daily  potassium phosphate IVPB 15 milliMole(s) IV Intermittent once  senna 2 Tablet(s) Oral at bedtime  sertraline 100 milliGRAM(s) Oral daily  simvastatin 10 milliGRAM(s) Oral at bedtime    MEDICATIONS  (PRN):  acetaminophen     Tablet .. 650 milliGRAM(s) Oral every 6 hours PRN Temp greater or equal to 38C (100.4F), Mild Pain (1 - 3)    PAST MEDICAL & SURGICAL HISTORY:  HTN (hypertension)      OA (osteoarthritis)      Spondylisthesis      Aortic valve disease  sclerotic aortic valve      History of right hip replacement      History of total right knee replacement      History of left knee replacement      History of left hip replacement      Surgery, elective  left shoulder replacement        FAMILY HISTORY:    SOCIAL HISTORY:  Tobacoo: [ ] Current, [ ] Former, [ ] Never; Pack Years:  Alcohol:  Illicit Drugs:    REVIEW OF SYSTEMS:  All other 10 review of systems is negative unless indicated above.    Vital Signs Last 24 Hrs  T(C): 36.8 (14 Nov 2023 09:32), Max: 36.9 (14 Nov 2023 02:13)  T(F): 98.2 (14 Nov 2023 09:32), Max: 98.4 (14 Nov 2023 02:13)  HR: 72 (14 Nov 2023 09:00) (60 - 97)  BP: 187/85 (14 Nov 2023 09:00) (139/63 - 211/105)  BP(mean): 122 (14 Nov 2023 09:00) (111 - 122)  RR: 20 (14 Nov 2023 09:00) (18 - 25)  SpO2: 100% (14 Nov 2023 09:00) (98% - 100%)    Parameters below as of 14 Nov 2023 09:00  Patient On (Oxygen Delivery Method): nasal cannula  O2 Flow (L/min): 3      11-14 @ 07:01  -  11-14 @ 11:57  --------------------------------------------------------  IN: 100 mL / OUT: 550 mL / NET: -450 mL        PHYSICAL EXAM:    General: Well developed; well nourished; in no acute distress  Eyes: Anicteric sclerae, moist conjunctivae  HENT: Moist mucous membranes  Neck: Trachea midline, supple  Lungs: Normal respiratory effort and no intercostal retractions  Cardiovascular: RRR  Abdomen: Soft, non-tender non-distended; Normal bowel sounds; No rebound or guarding  Extremities: Normal range of motion, No clubbing, cyanosis or edema  Neurological: Alert and oriented x1  Skin: Warm and dry. No obvious rash    LABS:                        6.6    7.10  )-----------( 371      ( 14 Nov 2023 10:05 )             24.7     11-14    144  |  111<H>  |  14  ----------------------------<  104<H>  4.0   |  24  |  0.80    Ca    9.0      14 Nov 2023 10:05  Phos  2.2     11-14  Mg     2.0     11-14    TPro  6.8  /  Alb  3.6  /  TBili  0.4  /  DBili  <0.2  /  AST  20  /  ALT  14  /  AlkPhos  99  11-14        PT/INR - ( 14 Nov 2023 02:02 )   PT: 11.9 sec;   INR: 1.05          PTT - ( 14 Nov 2023 02:02 )  PTT:25.7 sec    RADIOLOGY & ADDITIONAL STUDIES:      HPI:  78F PMHx vascular dementia, HTN, HLD, OA s/p b/l knee, hip replacements, presenting with SOB x2 weeks, worsening dementia, and decreased strength from baseline. History provided by patient's son at bedside. He says pt has been gasping for air and short of breath for the past 2 weeks, recently called EMS several days ago but was not brought to ED due to improvement, never hypoxic. He states she has been unable to move and has had no strength. Before this she was able to stand with assistance and move from bed to recliner. This prompted son to bring pt to her PCP yesterday where she got blood work done. This morning at 1 am he says he received a call from the lab telling him pt's hemoglobin is 4 and to report to the ED. He notes that in June 2023 pt had 1 week of episodes of black stool which resolved without intervention. He states he then noticed 3 days of black stools again 2 weeks ago which he described as tablespoons of black stools. He denies pt complaining of abdominal pain, dizziness after bowel movement, lightheadedness. He reports patient appears more pale and lethargic with decreased activity and mumbling since the beginning of November. Pt last saw PCP in March, was not told she had anemia at that time. She lives at home with a full time aide. Last EGD was many years ago, unsure when. Last colonoscopy in 2018 revealed benign pathology with instructions to repeat in 5 years. Pt takes aspirin daily as well as pantoprazole as instructed. Denies alcohol use. Currently denies CP, SOB, abdominal pain, nausea, vomiting, hematemesis hemoptysis     Allergies    No Known Allergies    Intolerances      Home Medications:  atenolol 25 mg oral tablet: 1 tab(s) orally once a day (at bedtime) (29 Mar 2021 09:10)  gabapentin 300 mg oral capsule: 1 cap(s) orally 2 times a day (29 Mar 2021 09:10)  losartan 100 mg oral tablet: 1 tab(s) orally once a day (29 Mar 2021 09:10)  pantoprazole 40 mg oral delayed release tablet: 1 tab(s) orally once a day (29 Mar 2021 09:10)  sertraline 100 mg oral tablet: 1 tab(s) orally once a day (14 Nov 2023 06:33)  simvastatin 10 mg oral tablet: 1 tab(s) orally once a day (at bedtime) (29 Mar 2021 09:10)  Vitamin D3 25 mcg (1000 intl units) oral tablet: 1 tab(s) orally 2 times a day (14 Nov 2023 06:37)    MEDICATIONS:  MEDICATIONS  (STANDING):  atenolol  Tablet 25 milliGRAM(s) Oral daily  gabapentin 300 milliGRAM(s) Oral daily  losartan 50 milliGRAM(s) Oral every 24 hours  pantoprazole  Injectable 40 milliGRAM(s) IV Push every 12 hours  polyethylene glycol 3350 17 Gram(s) Oral daily  potassium phosphate IVPB 15 milliMole(s) IV Intermittent once  senna 2 Tablet(s) Oral at bedtime  sertraline 100 milliGRAM(s) Oral daily  simvastatin 10 milliGRAM(s) Oral at bedtime    MEDICATIONS  (PRN):  acetaminophen     Tablet .. 650 milliGRAM(s) Oral every 6 hours PRN Temp greater or equal to 38C (100.4F), Mild Pain (1 - 3)    PAST MEDICAL & SURGICAL HISTORY:  HTN (hypertension)      OA (osteoarthritis)      Spondylisthesis      Aortic valve disease  sclerotic aortic valve      History of right hip replacement      History of total right knee replacement      History of left knee replacement      History of left hip replacement      Surgery, elective  left shoulder replacement        FAMILY HISTORY:    SOCIAL HISTORY:  [place]: [ ] Current, [ ] Former, [ ] Never; Pack Years:  Alcohol:  Illicit Drugs:    REVIEW OF SYSTEMS:  All other 10 review of systems is negative unless indicated above.    Vital Signs Last 24 Hrs  T(C): 36.8 (14 Nov 2023 09:32), Max: 36.9 (14 Nov 2023 02:13)  T(F): 98.2 (14 Nov 2023 09:32), Max: 98.4 (14 Nov 2023 02:13)  HR: 72 (14 Nov 2023 09:00) (60 - 97)  BP: 187/85 (14 Nov 2023 09:00) (139/63 - 211/105)  BP(mean): 122 (14 Nov 2023 09:00) (111 - 122)  RR: 20 (14 Nov 2023 09:00) (18 - 25)  SpO2: 100% (14 Nov 2023 09:00) (98% - 100%)    Parameters below as of 14 Nov 2023 09:00  Patient On (Oxygen Delivery Method): nasal cannula  O2 Flow (L/min): 3      11-14 @ 07:01  -  11-14 @ 11:57  --------------------------------------------------------  IN: 100 mL / OUT: 550 mL / NET: -450 mL        PHYSICAL EXAM:    General: Well developed; well nourished; in no acute distress  Eyes: Anicteric sclerae, moist conjunctivae  HENT: Moist mucous membranes  Neck: Trachea midline, supple  Lungs: Normal respiratory effort and no intercostal retractions  Cardiovascular: RRR  Abdomen: Soft, non-tender non-distended; Normal bowel sounds; No rebound or guarding  Extremities: Normal range of motion, No clubbing, cyanosis or edema  Neurological: Alert and oriented x1  Skin: Warm and dry. No obvious rash    LABS:                        6.6    7.10  )-----------( 371      ( 14 Nov 2023 10:05 )             24.7     11-14    144  |  111<H>  |  14  ----------------------------<  104<H>  4.0   |  24  |  0.80    Ca    9.0      14 Nov 2023 10:05  Phos  2.2     11-14  Mg     2.0     11-14    TPro  6.8  /  Alb  3.6  /  TBili  0.4  /  DBili  <0.2  /  AST  20  /  ALT  14  /  AlkPhos  99  11-14        PT/INR - ( 14 Nov 2023 02:02 )   PT: 11.9 sec;   INR: 1.05          PTT - ( 14 Nov 2023 02:02 )  PTT:25.7 sec    RADIOLOGY & ADDITIONAL STUDIES:     CTH w/o con: No hydrocephalus, midline shift, acute intracranial hemorrhage or demarcated territorial infarct

## 2023-11-14 NOTE — ED PROVIDER NOTE - PHYSICAL EXAMINATION
VITAL SIGNS: I have reviewed nursing notes and confirm.  CONSTITUTIONAL: Working to breathe with mild respiratory distress hypoxic on room air appears pale and jaundiced   SKIN:  warm and dry, no acute rash.   HEAD:  normocephalic, atraumatic.  EYES: EOM intact; conjunctiva and sclera clear.  ENT: No nasal discharge; airway clear.   NECK: Supple.  CARD: Hypertensive,Borderline tachycardic but not diaphoretic  RESP:  Intermittent wheezes mild respiratory distress able to speak in 4-5 word sentences tachypneic with abdominal breathing using accessory muscles Of respiration  ABD: Normal bowel sounds; soft; non-distended; non-tender; no guarding/ rebound.  EXT: Normal ROM. No peripheral edema. Pulses intact and equal b/l.  NEURO: Alert, oriented, grossly unremarkable  PSYCH: Cooperative, mood and affect appropriate.

## 2023-11-15 ENCOUNTER — TRANSCRIPTION ENCOUNTER (OUTPATIENT)
Age: 78
End: 2023-11-15

## 2023-11-15 DIAGNOSIS — Z51.5 ENCOUNTER FOR PALLIATIVE CARE: ICD-10-CM

## 2023-11-15 DIAGNOSIS — F03.911 UNSPECIFIED DEMENTIA, UNSPECIFIED SEVERITY, WITH AGITATION: ICD-10-CM

## 2023-11-15 DIAGNOSIS — J81.0 ACUTE PULMONARY EDEMA: ICD-10-CM

## 2023-11-15 DIAGNOSIS — R53.81 OTHER MALAISE: ICD-10-CM

## 2023-11-15 DIAGNOSIS — Z71.89 OTHER SPECIFIED COUNSELING: ICD-10-CM

## 2023-11-15 DIAGNOSIS — R13.10 DYSPHAGIA, UNSPECIFIED: ICD-10-CM

## 2023-11-15 LAB
ALBUMIN SERPL ELPH-MCNC: 3.5 G/DL — SIGNIFICANT CHANGE UP (ref 3.3–5)
ALBUMIN SERPL ELPH-MCNC: 3.5 G/DL — SIGNIFICANT CHANGE UP (ref 3.3–5)
ALP SERPL-CCNC: 94 U/L — SIGNIFICANT CHANGE UP (ref 40–120)
ALP SERPL-CCNC: 94 U/L — SIGNIFICANT CHANGE UP (ref 40–120)
ALT FLD-CCNC: 14 U/L — SIGNIFICANT CHANGE UP (ref 10–45)
ALT FLD-CCNC: 14 U/L — SIGNIFICANT CHANGE UP (ref 10–45)
ANION GAP SERPL CALC-SCNC: 10 MMOL/L — SIGNIFICANT CHANGE UP (ref 5–17)
ANION GAP SERPL CALC-SCNC: 10 MMOL/L — SIGNIFICANT CHANGE UP (ref 5–17)
APTT BLD: 26.6 SEC — SIGNIFICANT CHANGE UP (ref 24.5–35.6)
APTT BLD: 26.6 SEC — SIGNIFICANT CHANGE UP (ref 24.5–35.6)
AST SERPL-CCNC: 24 U/L — SIGNIFICANT CHANGE UP (ref 10–40)
AST SERPL-CCNC: 24 U/L — SIGNIFICANT CHANGE UP (ref 10–40)
BASOPHILS # BLD AUTO: 0.09 K/UL — SIGNIFICANT CHANGE UP (ref 0–0.2)
BASOPHILS # BLD AUTO: 0.09 K/UL — SIGNIFICANT CHANGE UP (ref 0–0.2)
BASOPHILS NFR BLD AUTO: 1.1 % — SIGNIFICANT CHANGE UP (ref 0–2)
BASOPHILS NFR BLD AUTO: 1.1 % — SIGNIFICANT CHANGE UP (ref 0–2)
BILIRUB SERPL-MCNC: 0.5 MG/DL — SIGNIFICANT CHANGE UP (ref 0.2–1.2)
BILIRUB SERPL-MCNC: 0.5 MG/DL — SIGNIFICANT CHANGE UP (ref 0.2–1.2)
BLD GP AB SCN SERPL QL: NEGATIVE — SIGNIFICANT CHANGE UP
BLD GP AB SCN SERPL QL: NEGATIVE — SIGNIFICANT CHANGE UP
BUN SERPL-MCNC: 11 MG/DL — SIGNIFICANT CHANGE UP (ref 7–23)
BUN SERPL-MCNC: 11 MG/DL — SIGNIFICANT CHANGE UP (ref 7–23)
CALCIUM SERPL-MCNC: 9.1 MG/DL — SIGNIFICANT CHANGE UP (ref 8.4–10.5)
CALCIUM SERPL-MCNC: 9.1 MG/DL — SIGNIFICANT CHANGE UP (ref 8.4–10.5)
CHLORIDE SERPL-SCNC: 108 MMOL/L — SIGNIFICANT CHANGE UP (ref 96–108)
CHLORIDE SERPL-SCNC: 108 MMOL/L — SIGNIFICANT CHANGE UP (ref 96–108)
CO2 SERPL-SCNC: 24 MMOL/L — SIGNIFICANT CHANGE UP (ref 22–31)
CO2 SERPL-SCNC: 24 MMOL/L — SIGNIFICANT CHANGE UP (ref 22–31)
CREAT SERPL-MCNC: 0.87 MG/DL — SIGNIFICANT CHANGE UP (ref 0.5–1.3)
CREAT SERPL-MCNC: 0.87 MG/DL — SIGNIFICANT CHANGE UP (ref 0.5–1.3)
EGFR: 68 ML/MIN/1.73M2 — SIGNIFICANT CHANGE UP
EGFR: 68 ML/MIN/1.73M2 — SIGNIFICANT CHANGE UP
EOSINOPHIL # BLD AUTO: 0.28 K/UL — SIGNIFICANT CHANGE UP (ref 0–0.5)
EOSINOPHIL # BLD AUTO: 0.28 K/UL — SIGNIFICANT CHANGE UP (ref 0–0.5)
EOSINOPHIL NFR BLD AUTO: 3.6 % — SIGNIFICANT CHANGE UP (ref 0–6)
EOSINOPHIL NFR BLD AUTO: 3.6 % — SIGNIFICANT CHANGE UP (ref 0–6)
GLUCOSE SERPL-MCNC: 93 MG/DL — SIGNIFICANT CHANGE UP (ref 70–99)
GLUCOSE SERPL-MCNC: 93 MG/DL — SIGNIFICANT CHANGE UP (ref 70–99)
HCT VFR BLD CALC: 25 % — LOW (ref 34.5–45)
HCT VFR BLD CALC: 25 % — LOW (ref 34.5–45)
HCT VFR BLD CALC: 27.4 % — LOW (ref 34.5–45)
HCT VFR BLD CALC: 27.4 % — LOW (ref 34.5–45)
HGB BLD-MCNC: 6.9 G/DL — CRITICAL LOW (ref 11.5–15.5)
HGB BLD-MCNC: 6.9 G/DL — CRITICAL LOW (ref 11.5–15.5)
HGB BLD-MCNC: 7.9 G/DL — LOW (ref 11.5–15.5)
HGB BLD-MCNC: 7.9 G/DL — LOW (ref 11.5–15.5)
IMM GRANULOCYTES NFR BLD AUTO: 1 % — HIGH (ref 0–0.9)
IMM GRANULOCYTES NFR BLD AUTO: 1 % — HIGH (ref 0–0.9)
INR BLD: 1.04 — SIGNIFICANT CHANGE UP (ref 0.85–1.18)
INR BLD: 1.04 — SIGNIFICANT CHANGE UP (ref 0.85–1.18)
LYMPHOCYTES # BLD AUTO: 1.09 K/UL — SIGNIFICANT CHANGE UP (ref 1–3.3)
LYMPHOCYTES # BLD AUTO: 1.09 K/UL — SIGNIFICANT CHANGE UP (ref 1–3.3)
LYMPHOCYTES # BLD AUTO: 13.8 % — SIGNIFICANT CHANGE UP (ref 13–44)
LYMPHOCYTES # BLD AUTO: 13.8 % — SIGNIFICANT CHANGE UP (ref 13–44)
MAGNESIUM SERPL-MCNC: 2.1 MG/DL — SIGNIFICANT CHANGE UP (ref 1.6–2.6)
MAGNESIUM SERPL-MCNC: 2.1 MG/DL — SIGNIFICANT CHANGE UP (ref 1.6–2.6)
MCHC RBC-ENTMCNC: 20.9 PG — LOW (ref 27–34)
MCHC RBC-ENTMCNC: 20.9 PG — LOW (ref 27–34)
MCHC RBC-ENTMCNC: 22 PG — LOW (ref 27–34)
MCHC RBC-ENTMCNC: 22 PG — LOW (ref 27–34)
MCHC RBC-ENTMCNC: 27.6 GM/DL — LOW (ref 32–36)
MCHC RBC-ENTMCNC: 27.6 GM/DL — LOW (ref 32–36)
MCHC RBC-ENTMCNC: 28.8 GM/DL — LOW (ref 32–36)
MCHC RBC-ENTMCNC: 28.8 GM/DL — LOW (ref 32–36)
MCV RBC AUTO: 75.8 FL — LOW (ref 80–100)
MCV RBC AUTO: 75.8 FL — LOW (ref 80–100)
MCV RBC AUTO: 76.3 FL — LOW (ref 80–100)
MCV RBC AUTO: 76.3 FL — LOW (ref 80–100)
MONOCYTES # BLD AUTO: 0.63 K/UL — SIGNIFICANT CHANGE UP (ref 0–0.9)
MONOCYTES # BLD AUTO: 0.63 K/UL — SIGNIFICANT CHANGE UP (ref 0–0.9)
MONOCYTES NFR BLD AUTO: 8 % — SIGNIFICANT CHANGE UP (ref 2–14)
MONOCYTES NFR BLD AUTO: 8 % — SIGNIFICANT CHANGE UP (ref 2–14)
NEUTROPHILS # BLD AUTO: 5.71 K/UL — SIGNIFICANT CHANGE UP (ref 1.8–7.4)
NEUTROPHILS # BLD AUTO: 5.71 K/UL — SIGNIFICANT CHANGE UP (ref 1.8–7.4)
NEUTROPHILS NFR BLD AUTO: 72.5 % — SIGNIFICANT CHANGE UP (ref 43–77)
NEUTROPHILS NFR BLD AUTO: 72.5 % — SIGNIFICANT CHANGE UP (ref 43–77)
NRBC # BLD: 2 /100 WBCS — HIGH (ref 0–0)
NRBC # BLD: 2 /100 WBCS — HIGH (ref 0–0)
NRBC # BLD: 3 /100 WBCS — HIGH (ref 0–0)
NRBC # BLD: 3 /100 WBCS — HIGH (ref 0–0)
PHOSPHATE SERPL-MCNC: 2.8 MG/DL — SIGNIFICANT CHANGE UP (ref 2.5–4.5)
PHOSPHATE SERPL-MCNC: 2.8 MG/DL — SIGNIFICANT CHANGE UP (ref 2.5–4.5)
PLATELET # BLD AUTO: 348 K/UL — SIGNIFICANT CHANGE UP (ref 150–400)
PLATELET # BLD AUTO: 348 K/UL — SIGNIFICANT CHANGE UP (ref 150–400)
PLATELET # BLD AUTO: 365 K/UL — SIGNIFICANT CHANGE UP (ref 150–400)
PLATELET # BLD AUTO: 365 K/UL — SIGNIFICANT CHANGE UP (ref 150–400)
POTASSIUM SERPL-MCNC: 3.8 MMOL/L — SIGNIFICANT CHANGE UP (ref 3.5–5.3)
POTASSIUM SERPL-MCNC: 3.8 MMOL/L — SIGNIFICANT CHANGE UP (ref 3.5–5.3)
POTASSIUM SERPL-SCNC: 3.8 MMOL/L — SIGNIFICANT CHANGE UP (ref 3.5–5.3)
POTASSIUM SERPL-SCNC: 3.8 MMOL/L — SIGNIFICANT CHANGE UP (ref 3.5–5.3)
PROT SERPL-MCNC: 6.8 G/DL — SIGNIFICANT CHANGE UP (ref 6–8.3)
PROT SERPL-MCNC: 6.8 G/DL — SIGNIFICANT CHANGE UP (ref 6–8.3)
PROTHROM AB SERPL-ACNC: 11.8 SEC — SIGNIFICANT CHANGE UP (ref 9.5–13)
PROTHROM AB SERPL-ACNC: 11.8 SEC — SIGNIFICANT CHANGE UP (ref 9.5–13)
RBC # BLD: 3.3 M/UL — LOW (ref 3.8–5.2)
RBC # BLD: 3.3 M/UL — LOW (ref 3.8–5.2)
RBC # BLD: 3.59 M/UL — LOW (ref 3.8–5.2)
RBC # BLD: 3.59 M/UL — LOW (ref 3.8–5.2)
RBC # FLD: 20.6 % — HIGH (ref 10.3–14.5)
RBC # FLD: 20.6 % — HIGH (ref 10.3–14.5)
RBC # FLD: 21.1 % — HIGH (ref 10.3–14.5)
RBC # FLD: 21.1 % — HIGH (ref 10.3–14.5)
RH IG SCN BLD-IMP: POSITIVE — SIGNIFICANT CHANGE UP
RH IG SCN BLD-IMP: POSITIVE — SIGNIFICANT CHANGE UP
SODIUM SERPL-SCNC: 142 MMOL/L — SIGNIFICANT CHANGE UP (ref 135–145)
SODIUM SERPL-SCNC: 142 MMOL/L — SIGNIFICANT CHANGE UP (ref 135–145)
WBC # BLD: 10.39 K/UL — SIGNIFICANT CHANGE UP (ref 3.8–10.5)
WBC # BLD: 10.39 K/UL — SIGNIFICANT CHANGE UP (ref 3.8–10.5)
WBC # BLD: 7.88 K/UL — SIGNIFICANT CHANGE UP (ref 3.8–10.5)
WBC # BLD: 7.88 K/UL — SIGNIFICANT CHANGE UP (ref 3.8–10.5)
WBC # FLD AUTO: 10.39 K/UL — SIGNIFICANT CHANGE UP (ref 3.8–10.5)
WBC # FLD AUTO: 10.39 K/UL — SIGNIFICANT CHANGE UP (ref 3.8–10.5)
WBC # FLD AUTO: 7.88 K/UL — SIGNIFICANT CHANGE UP (ref 3.8–10.5)
WBC # FLD AUTO: 7.88 K/UL — SIGNIFICANT CHANGE UP (ref 3.8–10.5)

## 2023-11-15 PROCEDURE — 76937 US GUIDE VASCULAR ACCESS: CPT | Mod: 26

## 2023-11-15 PROCEDURE — 71045 X-RAY EXAM CHEST 1 VIEW: CPT | Mod: 26

## 2023-11-15 PROCEDURE — 99233 SBSQ HOSP IP/OBS HIGH 50: CPT | Mod: GC

## 2023-11-15 PROCEDURE — 99223 1ST HOSP IP/OBS HIGH 75: CPT

## 2023-11-15 PROCEDURE — 36000 PLACE NEEDLE IN VEIN: CPT

## 2023-11-15 PROCEDURE — 99497 ADVNCD CARE PLAN 30 MIN: CPT | Mod: 25

## 2023-11-15 RX ORDER — FUROSEMIDE 40 MG
20 TABLET ORAL ONCE
Refills: 0 | Status: COMPLETED | OUTPATIENT
Start: 2023-11-15 | End: 2023-11-15

## 2023-11-15 RX ORDER — FUROSEMIDE 40 MG
20 TABLET ORAL ONCE
Refills: 0 | Status: DISCONTINUED | OUTPATIENT
Start: 2023-11-15 | End: 2023-11-15

## 2023-11-15 RX ORDER — ATENOLOL 25 MG/1
25 TABLET ORAL DAILY
Refills: 0 | Status: DISCONTINUED | OUTPATIENT
Start: 2023-11-15 | End: 2023-11-18

## 2023-11-15 RX ADMIN — SERTRALINE 100 MILLIGRAM(S): 25 TABLET, FILM COATED ORAL at 13:11

## 2023-11-15 RX ADMIN — POLYETHYLENE GLYCOL 3350 17 GRAM(S): 17 POWDER, FOR SOLUTION ORAL at 12:26

## 2023-11-15 RX ADMIN — SENNA PLUS 2 TABLET(S): 8.6 TABLET ORAL at 21:26

## 2023-11-15 RX ADMIN — ATENOLOL 25 MILLIGRAM(S): 25 TABLET ORAL at 12:35

## 2023-11-15 RX ADMIN — QUETIAPINE FUMARATE 100 MILLIGRAM(S): 200 TABLET, FILM COATED ORAL at 21:48

## 2023-11-15 RX ADMIN — Medication 20 MILLIGRAM(S): at 12:27

## 2023-11-15 RX ADMIN — GABAPENTIN 300 MILLIGRAM(S): 400 CAPSULE ORAL at 12:27

## 2023-11-15 RX ADMIN — SIMVASTATIN 10 MILLIGRAM(S): 20 TABLET, FILM COATED ORAL at 21:26

## 2023-11-15 RX ADMIN — PANTOPRAZOLE SODIUM 40 MILLIGRAM(S): 20 TABLET, DELAYED RELEASE ORAL at 00:18

## 2023-11-15 RX ADMIN — PANTOPRAZOLE SODIUM 40 MILLIGRAM(S): 20 TABLET, DELAYED RELEASE ORAL at 12:26

## 2023-11-15 RX ADMIN — IRON SUCROSE 176.67 MILLIGRAM(S): 20 INJECTION, SOLUTION INTRAVENOUS at 17:47

## 2023-11-15 NOTE — PROGRESS NOTE ADULT - PROBLEM SELECTOR PLAN 8
F: s/p 1L NS in ED 3 upRBCs  E: replete as needed  N: NPO   GI: IV PPI BID  DVT: hold iso anemia of unknown etiology  Code:   Dispo: 7[facility] F: s/p 1L NS in ED 3 upRBCs  E: replete as needed  N: NPO   GI: IV PPI BID  DVT: hold iso anemia of unknown etiology  Code:  DNR/DNI, otherwise all measures  Dispo: 7[facility]

## 2023-11-15 NOTE — CONSULT NOTE ADULT - PROBLEM SELECTOR RECOMMENDATION 6
.  Complex medical decision making / symptom management in the setting of advanced illness.    Coping:  well[  ] with difficulty[ x ] poor coping[  ] unable to assess[  ]   Support system: strong[  x] adequate[  ] inadequate[  ]    Active Psychosocial Referrals:  SW/CM[  x] PT/OT[x  ] Hospice[  ]  Ethics[  ]  [facility] Patient/Family Support[  ] Chaplaincy[   ]  Massage Therapy[ x ] Music Therapy [x  ] Holistic RN[  ]    Active Outpatient Palliative Care Referrals:  Supportive Oncology Clinic [ ]  Geriatrics Clinic [x ]    - For new or uncontrolled symptoms, please call Palliative Care at [phone]-[facility]. The service is available 24/7 (including nights & weekends) to provide symptom management recommendations over the phone as appropriate  - Will continue to follow for ongoing GOC discussion / titration of palliative regimen. Emotional support provided, questions answered.

## 2023-11-15 NOTE — PROGRESS NOTE ADULT - ATTENDING COMMENTS
HTN, COPD, OA, dementia with anemia likely related to UGI bleeding  physical as above  plan EGD for tomorrow  transfusing another unit PRBC with lasix  continue PPI BID  continue telemetry with pulmonary edema although echo with mild LVH, diastology no interpretable  rest as above  decision making of high complexity

## 2023-11-15 NOTE — PROGRESS NOTE ADULT - PROBLEM SELECTOR PROBLEM 1
Vaccine Information Statement(s) was given today. This has been reviewed, questions answered, and verbal consent given by Patient for injection(s) and administration of Tetanus/Diphtheria/Pertussis (Tdap).    Patient tolerated without incident. See immunization grid for documentation.   Symptomatic anemia

## 2023-11-15 NOTE — CONSULT NOTE ADULT - PROBLEM SELECTOR RECOMMENDATION 5
.  - DNR DNI No peg No HD, MOLST completed 11/15  - OK to rescind DNI for EGD procedure  - son, Trever Lamb, is PCG and surrogate     In addition to the E/M visit, an advance care planning meeting was performed. Start time:1130 ; End time: 1200; Total time: 30 min. A face to face meeting to discuss advance care planning was held today regarding:   Lynsey Lamb  Primary decision maker:  Patient is unable to participate in decision making;  Alternate/surrogate:  Trever Lamb  . Discussed advance directives including, but not limited to, healthcare proxy and code status, as well as disease trajectory, patient's values/goals, and health care options that are available for end of life care. Decision regarding code status: DNR/DNI ; Documentation completed today: [facility] note

## 2023-11-15 NOTE — PROGRESS NOTE ADULT - PROBLEM SELECTOR PLAN 1
DDx includes most likelt UGI bleed, vs less likely hemolysis, decrease production  Last colonoscopy in 2018 with recommendation to return in 5 years (this year). No active signs of bleeding, patient's son states that she takes Naproxen and Meloxicam for back pain every other day.   Hgb 4.5 on admission (Microcytic as MCV was 70), s/p 2 units of pRBCs with increase of Hgb to 6.6. --> 6.9  GI consulted EGD planned for 11/15.  Plan:  - 1u pRBCs today  - f/u post-transfusion CBC  - maintain active T&S, 2 large bore IV  - EGD today with GI  - f/u Parvovirus B19 PCR

## 2023-11-15 NOTE — PROCEDURE NOTE - NSICDXPROCEDURE_GEN_ALL_CORE_FT
PROCEDURES:  Insertion of intravenous catheter with ultrasound guidance 15-Nov-2023 16:01:07  Dalila Norman

## 2023-11-15 NOTE — CONSULT NOTE ADULT - CONSULT REASON
Symptomatic Anemia
Palliative consulted for complex medical decision making / symptom management in the setting of dementia
Symptomatic Anemia

## 2023-11-15 NOTE — PROGRESS NOTE ADULT - PROBLEM SELECTOR PLAN 2
Patient with acute pulmnmary edema requiring O2 likely 2/2 repeat transfusions. TTE with LVH, likey indicating reduced compliance and preload tolerance.  s/p Lasix 20 mg IV x2. Blines bilaterally on POCUS 11/15.    -lasix 20 mg IV today with transfusion

## 2023-11-15 NOTE — CONSULT NOTE ADULT - ASSESSMENT
78F ambulatory with walker from home with HHA PMHx vascular dementia, cb severe behavioral disturbances, OA s/p b/l knee, hip replacements, pw  SOB x2 weeks, FTT. Admitted for mgmt, tx MARINA sp pRBCs. Cf GIB 2/2 PUD vs esophagitis, pending EGD. Palliative consulted for complex medical decision making / symptom management in the setting of advanced illness.

## 2023-11-15 NOTE — CONSULT NOTE ADULT - CONVERSATION DETAILS
Met with patient's son at bedside, pt unable to make informed medical decisions due to encephalopathy 2/2 structural d/o of the brain/dementia. Introduced the role of palliative medicine for ACP, GOC, and support.    Pts son describes pts acute decline in functional and cognitive statuses over the last 2 months after pt was dx with vascular dementia ~6 months ago. Explained that patient's increased behavioral disturbances, inattention, food avoidance  demonstrate progression of pts dementia. Patient does not meet hospice criteria, however we discussed signs and symptoms of advanced disease that may warrant a referral including: dysphagia, its negative sequelae (aspiration PNA), severe protein calorie malnutrition, and poor/bedbound functional status.     Discussed risks/benefits of LST including cpr/intubation, PEG, HD in the context of debility and progressing dementia. Son feels that these interventions would prolong suffering. He and his mother discussed ACP at the time of diagnosis, at which point his mother shared that allowing a natural death would be her preference. Verbal consent obtained to complete DNR DNI No PEG No HD MOLST, copy placed chart.     *He understands that DNI will be rescinded for EGD procedure.    Questions answered, support provided.

## 2023-11-15 NOTE — CONSULT NOTE ADULT - PROBLEM SELECTOR RECOMMENDATION 9
.  progressive in the last 2 months, at baseline pt with hallucinations, worse at night  - cw home Seroquel 100 mg PO qD -- qtc 432 11/14/23  - cw home sertraline 100 mg PO qD  - For acute anxiety not responsive to verbal redirection, can use zyprexa 2.5mg PO BID PRN for acute anxiety/panic, and in anticipation of invasive procedures as needed. Please order EKG to check Qtc prior to first administration.  -Monitor for possible qTC prolongation daily. If >500ms, recommend discontinuing Zyprexa  - Do not co-administer IM zyprexa with IM/IV benzos within 4 hours of each other, due to risk of autonomic suppression

## 2023-11-15 NOTE — CONSULT NOTE ADULT - PROBLEM SELECTOR RECOMMENDATION 2
.  risk for aspiration in context of adv dementia  - SLP richard noted  - PEG not within GOC nor would be appropriate/recommended for patients with dementia as risks outweigh benefit

## 2023-11-15 NOTE — PROGRESS NOTE ADULT - SUBJECTIVE AND OBJECTIVE BOX
SUBJECTIVE/OVERNIGHT EVENTS: Overnight patient was transitioned from HFNC to 6LNC.     Pt seen in AM at bedside, resting comfortably in bed, and does not appear to be in any acute distress. ROS could not be completed due to patient's mental status.    VITAL SIGNS:  Vital Signs Last 24 Hrs  T(C): 36.8 (15 Nov 2023 09:56), Max: 36.9 (14 Nov 2023 22:24)  T(F): 98.3 (15 Nov 2023 09:56), Max: 98.4 (14 Nov 2023 22:24)  HR: 82 (15 Nov 2023 11:33) (64 - 86)  BP: 150/113 (15 Nov 2023 08:45) (148/90 - 207/90)  BP(mean): 121 (15 Nov 2023 08:45) (103 - 128)  RR: 19 (15 Nov 2023 11:33) (18 - 26)  SpO2: 99% (15 Nov 2023 11:33) (92% - 100%)    Parameters below as of 15 Nov 2023 11:33  Patient On (Oxygen Delivery Method): nasal cannula w/ humidification  O2 Flow (L/min): 2      PHYSICAL EXAM:  General: NAD; Maltese speaking, slurred speech that is not new, able to answer questions and cooperating to exam.   HEENT: PERRL; EOMI; MMM  Neck: supple; no JVD  Cardiac: RRR; +S1/S2, no murmurs  Pulm: crackles in the b/l lower lobes, POCUS at bedside showing B-lines on bilateral lung fields, no wheezing heard  GI: soft, NT/ND, +BS  Extremities: WWP; no edema, clubbing or cyanosis  Vasc: 2+ radial, DP pulses B/L  Neuro: AAOx2; no focal deficits    MEDICATIONS:  MEDICATIONS  (STANDING):  atenolol  Tablet 25 milliGRAM(s) Oral daily  furosemide   Injectable 20 milliGRAM(s) IV Push once  gabapentin 300 milliGRAM(s) Oral daily  iron sucrose IVPB 300 milliGRAM(s) IV Intermittent every 24 hours  losartan 100 milliGRAM(s) Oral daily  pantoprazole  Injectable 40 milliGRAM(s) IV Push every 12 hours  polyethylene glycol 3350 17 Gram(s) Oral daily  QUEtiapine 100 milliGRAM(s) Oral at bedtime  senna 2 Tablet(s) Oral at bedtime  sertraline 100 milliGRAM(s) Oral daily  simvastatin 10 milliGRAM(s) Oral at bedtime    MEDICATIONS  (PRN):  acetaminophen     Tablet .. 650 milliGRAM(s) Oral every 6 hours PRN Temp greater or equal to 38C (100.4F), Mild Pain (1 - 3)      ALLERGIES:  Allergies    No Known Allergies    Intolerances        LABS:                        6.9    7.88  )-----------( 365      ( 15 Nov 2023 05:30 )             25.0     11-15    142  |  108  |  11  ----------------------------<  93  3.8   |  24  |  0.87    Ca    9.1      15 Nov 2023 05:30  Phos  2.8     11-15  Mg     2.1     11-15    TPro  6.8  /  Alb  3.5  /  TBili  0.5  /  DBili  x   /  AST  24  /  ALT  14  /  AlkPhos  94  11-15    PT/INR - ( 15 Nov 2023 05:30 )   PT: 11.8 sec;   INR: 1.04          PTT - ( 15 Nov 2023 05:30 )  PTT:26.6 sec    RADIOLOGY & ADDITIONAL TESTS: Reviewed.

## 2023-11-15 NOTE — PROCEDURE NOTE - NSPROCDETAILS_GEN_ALL_CORE
location identified, draped/prepped, sterile technique used/blood seen on insertion/dressing applied/flushes easily/secured in place/sterile technique, catheter placed USG/location identified, draped/prepped, sterile technique used/blood seen on insertion/dressing applied/flushes easily/secured in place/sterile technique, catheter placed

## 2023-11-15 NOTE — CONSULT NOTE ADULT - SUBJECTIVE AND OBJECTIVE BOX
HPI 78F PMHx vascular dementia, HTN, HLD, OA s/p b/l knee, hip replacements presenting to the hospital due to outpatient labs showing Hgb of 4.5. The patient is unable to provide history due to dementia, therefore the patient's son is the main source of history. The patient has been feeling SOB, weakness and worsening demential for the past 2-3 weeks. No recent dark stools or bright blood noted in stools. On arrival, Hgb was 4.4 for which the patient received 2 units of pRBC. The patient was noted with crackles on exam in AM and was given Lasix for Flash Pulmonary edema. Repeat Hgb showed proper improvement (6.6). The patient was found with hypoxia on arrival and was placed on HFNC which was weaned to 6L NC in AM. The patient was also noted with Hypertension for which she was given Hydralazine in the ED, losartan in the AM and is also s/p Lasix.     Palliative consulted for complex medical decision making / symptom management in the setting of advanced illness. Hospital course, primary team, and consultant notes reviewed. Pt seen and examined, lying in bed  lethargic, but awake. Collateral from son and PCG at bedside. Pt dx vasc dementia 6 months ago, he describes acute decline in the last 2 months. Over the course of that period, pt has become increasingly chair/bedbound. She remains ambulatory w walker, but son notes decreased safety awareness and bl LE weakness. She continues tolerate PO, is mostly a full feed. No witnessed coughing or aspiration, however son shares that pt has become increasingly food avoidant. Pt is continent of BB and verbal, speaks several sentences a day, which is a decline compared to 4 months ago. Most distressing symptoms are behavioral issues and hallucinations at night. Pt has required rapid uptitration of seroquel from 25 to now 100 mg qD over the last 3 months.  Comprehensive ROS as noted below, collateral obtained from chart/team/family. Exploration of GOC documented as below.    PAST MEDICAL & SURGICAL HISTORY:  HTN (hypertension)  OA (osteoarthritis)  Spondylistheis  Aortic valve disease  sclerotic aortic valve  History of right hip replacement  History of total right knee replacement  History of left knee replacement  History of left hip replacement  Surgery, elective  left shoulder replacement    FAMILY HISTORY:   Reviewed; no history of     in mother or father    Opiate Naive (Y/N):  Yes.  iStop reviewed (Y/N): Yes.   No Rx found on iStop review (Ref#: [id number]    Items that are not checked are not present  PSYCHOSOCIAL ASSESSMENT:  - Significant other/partner: [  ]  Children: [x  ]  - Living Situation: Home [  x] Long term care [  ]  Rehab[  ]  Other[  ]  - Support system: strong[ x ] adequate[  ] inadequate[  ]  - Voodoo/Spiritual practice:  - Role of organized Catholic important [  ] some [  ] unable to assess dt pt mentation [x  ]  - Coping: well[  ]  with difficulty[  ]  poor coping[  ]  unable to assess dt pt mentation [  x]  - Albanian speaking     ADVANCE DIRECTIVES:   none   - MOLST[  ]     - Living Will [  ]     DECISION MAKER(s):  - Health Care Proxy(s) [  ]  Surrogate(s)[ x ] Guardian[  ]           Name(s)/Phone Number(s):   - Trever azevedo     BASELINE (I)ADLs (prior to admission):  - Rhea:  Total[  ] Moderate[ x ] Dependent[  ]  - has HHA at home, lives with son and son and law who assist with ADLs     Allergies    No Known Allergies    Intolerances        Medications:      MEDICATIONS  (STANDING):  atenolol  Tablet 25 milliGRAM(s) Oral daily  gabapentin 300 milliGRAM(s) Oral daily  iron sucrose IVPB 300 milliGRAM(s) IV Intermittent every 24 hours  losartan 100 milliGRAM(s) Oral daily  pantoprazole  Injectable 40 milliGRAM(s) IV Push every 12 hours  polyethylene glycol 3350 17 Gram(s) Oral daily  QUEtiapine 100 milliGRAM(s) Oral at bedtime  senna 2 Tablet(s) Oral at bedtime  sertraline 100 milliGRAM(s) Oral daily  simvastatin 10 milliGRAM(s) Oral at bedtime    MEDICATIONS  (PRN):  acetaminophen     Tablet .. 650 milliGRAM(s) Oral every 6 hours PRN Temp greater or equal to 38C (100.4F), Mild Pain (1 - 3)      PRESENT SYMPTOMS:   [ x]Unable to obtain due to poor mentation   Source if other than patient:  [x ]Family   [ ]Team     Pain [  ]    PAIN AD Score:  0  [email or web address] (press ctrl +  left click to view)    Analgesic Use (PRNs) for past 24 hours:  - tylenol PRN x0      If [  ], pt denies symptom.   Dyspnea:         [  ]  Anxiety:           [ x ]  Difficulty sleeping: [ x ]  Fatigue:           [  ]  Nausea:           [  ]  Loss of appetite:     [  ] food avoidant   Dysphagia: [  ]  Constipation:   [  ]        Other Symptoms:    All other review of systems negative [x  ]    ECOG Performance:     3  Current Palliative Performance Scale/Karnofsky Score:    40%  Preadmit Karnofsky: 40   %          PEx:  General:  obese woman lying in bed NAD  alert  oriented x   2    lethargic verbal  Behavioral: calm, cooperative   HEENT: atraumatic,  No temporal wasting,  No dry mouth  RESP: Reg rhythm, No  tachypnea/labored breathing,  No audible excessive secretions,  CTAB, diminished bilat bases  CV: RRR, S1S2,  No  tachycardia  GI: soft non distended non tender  functionally incontinent   : normal  incontinent  oliguria/anuria  dawn  MUSK: weakness x4,  edema, ambulatory with assistance,   mostly/fully  BB/WC bound  SKIN: No abnormal skin lesions, Poor skin turgor, Pressure ulcer stage:   NEURO:  No deficits, cognitive impairment, encephalopathic dsyphagia dysarthria paresis  Oral intake ability: unable/only mouth care, minimal moderate full capability      T(C): 36.3 (11-15-23 @ 12:15), Max: 36.9 (11-14-23 @ 22:24)  HR: 86 (11-15-23 @ 12:30) (66 - 86)  BP: 160/72 (11-15-23 @ 12:15) (148/90 - 183/85)  RR: 18 (11-15-23 @ 12:15) (18 - 26)  SpO2: 97% (11-15-23 @ 12:30) (92% - 100%)  Wt(kg): --    Labs:    CBC:                        6.9    7.88  )-----------( 365      ( 15 Nov 2023 05:30 )             25.0     CMP:    11-15    142  |  108  |  11  ----------------------------<  93  3.8   |  24  |  0.87    Ca    9.1      15 Nov 2023 05:30  Phos  2.8     11-15  Mg     2.1     11-15    TPro  6.8  /  Alb  3.5  /  TBili  0.5  /  DBili  x   /  AST  24  /  ALT  14  /  AlkPhos  94  11-15    PT/INR - ( 15 Nov 2023 05:30 )   PT: 11.8 sec;   INR: 1.04          PTT - ( 15 Nov 2023 05:30 )  PTT:26.6 sec   Urinalysis Basic - ( 15 Nov 2023 05:30 )    Color: x / Appearance: x / SG: x / pH: x  Gluc: 93 mg/dL / Ketone: x  / Bili: x / Urobili: x   Blood: x / Protein: x / Nitrite: x   Leuk Esterase: x / RBC: x / WBC x   Sq Epi: x / Non Sq Epi: x / Bacteria: x        RADIOLOGY & ADDITIONAL STUDIES:  Imaging:  Reviewed      GO discussion:  HPI 78F PMHx vascular dementia, HTN, HLD, OA s/p b/l knee, hip replacements presenting to the hospital due to outpatient labs showing Hgb of 4.5. The patient is unable to provide history due to dementia, therefore the patient's son is the main source of history. The patient has been feeling SOB, weakness and worsening demential for the past 2-3 weeks. No recent dark stools or bright blood noted in stools. On arrival, Hgb was 4.4 for which the patient received 2 units of pRBC. The patient was noted with crackles on exam in AM and was given Lasix for Flash Pulmonary edema. Repeat Hgb showed proper improvement (6.6). The patient was found with hypoxia on arrival and was placed on HFNC which was weaned to 6L NC in AM. The patient was also noted with Hypertension for which she was given Hydralazine in the ED, losartan in the AM and is also s/p Lasix.     Palliative consulted for complex medical decision making / symptom management in the setting of advanced illness. Hospital course, primary team, and consultant notes reviewed. Pt seen and examined, lying in bed  lethargic, but awake. Collateral from son and PCG at bedside. Pt dx vasc dementia 6 months ago, he describes acute decline in the last 2 months. Over the course of that period, pt has become increasingly chair/bedbound. She remains ambulatory w walker, but son notes decreased safety awareness and bl LE weakness. She continues tolerate PO, is mostly a full feed. No witnessed coughing or aspiration, however son shares that pt has become increasingly food avoidant. Pt is continent of BB and verbal, speaks several sentences a day, which is a decline compared to 4 months ago. Most distressing symptoms are behavioral issues and hallucinations at night. Pt has required rapid uptitration of seroquel from 25 to now 100 mg qD over the last 3 months.  Comprehensive ROS as noted below, collateral obtained from chart/team/family. Exploration of GOC documented as below.    PAST MEDICAL & SURGICAL HISTORY:  HTN (hypertension)  OA (osteoarthritis)  Spondylistheis  Aortic valve disease  sclerotic aortic valve  History of right hip replacement  History of total right knee replacement  History of left knee replacement  History of left hip replacement  Surgery, elective  left shoulder replacement    FAMILY HISTORY:   Reviewed; no history of     in mother or father    Opiate Naive (Y/N):  Yes.  iStop reviewed (Y/N): Yes.   No Rx found on iStop review (Ref#: [id number]    Items that are not checked are not present  PSYCHOSOCIAL ASSESSMENT:  - Significant other/partner: [  ]  Children: [x  ]  - Living Situation: Home [  x] Long term care [  ]  Rehab[  ]  Other[  ]  - Support system: strong[ x ] adequate[  ] inadequate[  ]  - Hindu/Spiritual practice:  - Role of organized Restorationism important [  ] some [  ] unable to assess dt pt mentation [x  ]  - Coping: well[  ]  with difficulty[  ]  poor coping[  ]  unable to assess dt pt mentation [  x]  - Slovak speaking     ADVANCE DIRECTIVES:   none   - MOLST[  ]     - Living Will [  ]     DECISION MAKER(s):  - Health Care Proxy(s) [  ]  Surrogate(s)[ x ] Guardian[  ]           Name(s)/Phone Number(s):   - Trever azevedo     BASELINE (I)ADLs (prior to admission):  - Morgan:  Total[  ] Moderate[ x ] Dependent[  ]  - has HHA at home, lives with son and son and law who assist with ADLs     Allergies    No Known Allergies    Intolerances        Medications:      MEDICATIONS  (STANDING):  atenolol  Tablet 25 milliGRAM(s) Oral daily  gabapentin 300 milliGRAM(s) Oral daily  iron sucrose IVPB 300 milliGRAM(s) IV Intermittent every 24 hours  losartan 100 milliGRAM(s) Oral daily  pantoprazole  Injectable 40 milliGRAM(s) IV Push every 12 hours  polyethylene glycol 3350 17 Gram(s) Oral daily  QUEtiapine 100 milliGRAM(s) Oral at bedtime  senna 2 Tablet(s) Oral at bedtime  sertraline 100 milliGRAM(s) Oral daily  simvastatin 10 milliGRAM(s) Oral at bedtime    MEDICATIONS  (PRN):  acetaminophen     Tablet .. 650 milliGRAM(s) Oral every 6 hours PRN Temp greater or equal to 38C (100.4F), Mild Pain (1 - 3)      PRESENT SYMPTOMS:   [ x]Unable to obtain due to poor mentation   Source if other than patient:  [x ]Family   [ ]Team     Pain [  ]    PAIN AD Score:  0  [email or web address] (press ctrl +  left click to view)    Analgesic Use (PRNs) for past 24 hours:  - tylenol PRN x0      If [  ], pt denies symptom.   Dyspnea:         [  ]  Anxiety:           [ x ]  Difficulty sleeping: [ x ]  Fatigue:           [  ]  Nausea:           [  ]  Loss of appetite:     [  ] food avoidant   Dysphagia: [  ]  Constipation:   [  ]        Other Symptoms:    All other review of systems negative [x  ]    ECOG Performance:     3  Current Palliative Performance Scale/Karnofsky Score:    40%  Preadmit Karnofsky: 40   %          PEx:  General:  obese woman lying in bed NAD  alert  oriented x   2    lethargic verbal  Behavioral: calm, cooperative   HEENT: atraumatic,  No temporal wasting,  No dry mouth  RESP: Reg rhythm, No  tachypnea/labored breathing,  No audible excessive secretions,  CTAB, diminished bilat bases  CV: RRR, S1S2,  No  tachycardia  GI: soft non distended non tender  functionally incontinent   : dawn   MUSK: weakness x4,  edema, ambulatory with assistance,  SKIN: No abnormal skin lesions, Poor skin turgor  NEURO:+ cognitive impairment, follows commands inattentive No overt dsyphagia dysarthria paresis,   Oral intake ability: requires assistance with feeds       T(C): 36.3 (11-15-23 @ 12:15), Max: 36.9 (11-14-23 @ 22:24)  HR: 86 (11-15-23 @ 12:30) (66 - 86)  BP: 160/72 (11-15-23 @ 12:15) (148/90 - 183/85)  RR: 18 (11-15-23 @ 12:15) (18 - 26)  SpO2: 97% (11-15-23 @ 12:30) (92% - 100%)  Wt(kg): --    Labs:    CBC:                        6.9    7.88  )-----------( 365      ( 15 Nov 2023 05:30 )             25.0     CMP:    11-15    142  |  108  |  11  ----------------------------<  93  3.8   |  24  |  0.87    Ca    9.1      15 Nov 2023 05:30  Phos  2.8     11-15  Mg     2.1     11-15    TPro  6.8  /  Alb  3.5  /  TBili  0.5  /  DBili  x   /  AST  24  /  ALT  14  /  AlkPhos  94  11-15    PT/INR - ( 15 Nov 2023 05:30 )   PT: 11.8 sec;   INR: 1.04          PTT - ( 15 Nov 2023 05:30 )  PTT:26.6 sec   Urinalysis Basic - ( 15 Nov 2023 05:30 )    Color: x / Appearance: x / SG: x / pH: x  Gluc: 93 mg/dL / Ketone: x  / Bili: x / Urobili: x   Blood: x / Protein: x / Nitrite: x   Leuk Esterase: x / RBC: x / WBC x   Sq Epi: x / Non Sq Epi: x / Bacteria: x        RADIOLOGY & ADDITIONAL STUDIES:  Imaging:  Reviewed      GOC discussion:

## 2023-11-15 NOTE — CHART NOTE - NSCHARTNOTEFT_GEN_A_CORE
Planned for EGD today in endoscopy unit, but was fed applesauce with medications prior to procedure.     Patient remains hemodynamically stable. Received an additional 1U pRBCs this AM for Hb of 6.9.     Recommendations:   - plan for EGD tomorrow (currently saturating 96% on 2L NC)   - risks/benefits discussed with patient's HCP (Trever Lmab)   - trend Hb and maintain active type and screen   - continue pantoprazole 40 mg IV BID   - NPO except medications after midnight tonight     Case discussed with Dr. Pereira. GI Team will continue to follow.     Iza Mathis D.O.   Gastroenterology Fellow  Weekday 7am-5pm Pager: [phone]  Weeknights/Weekend/Holiday Coverage: Please call the  for contact info

## 2023-11-16 LAB
ALBUMIN SERPL ELPH-MCNC: 3.9 G/DL — SIGNIFICANT CHANGE UP (ref 3.3–5)
ALBUMIN SERPL ELPH-MCNC: 3.9 G/DL — SIGNIFICANT CHANGE UP (ref 3.3–5)
ALP SERPL-CCNC: 107 U/L — SIGNIFICANT CHANGE UP (ref 40–120)
ALP SERPL-CCNC: 107 U/L — SIGNIFICANT CHANGE UP (ref 40–120)
ALT FLD-CCNC: 13 U/L — SIGNIFICANT CHANGE UP (ref 10–45)
ALT FLD-CCNC: 13 U/L — SIGNIFICANT CHANGE UP (ref 10–45)
ANION GAP SERPL CALC-SCNC: 10 MMOL/L — SIGNIFICANT CHANGE UP (ref 5–17)
ANION GAP SERPL CALC-SCNC: 10 MMOL/L — SIGNIFICANT CHANGE UP (ref 5–17)
AST SERPL-CCNC: 21 U/L — SIGNIFICANT CHANGE UP (ref 10–40)
AST SERPL-CCNC: 21 U/L — SIGNIFICANT CHANGE UP (ref 10–40)
BASOPHILS # BLD AUTO: 0.1 K/UL — SIGNIFICANT CHANGE UP (ref 0–0.2)
BASOPHILS # BLD AUTO: 0.1 K/UL — SIGNIFICANT CHANGE UP (ref 0–0.2)
BASOPHILS NFR BLD AUTO: 1 % — SIGNIFICANT CHANGE UP (ref 0–2)
BASOPHILS NFR BLD AUTO: 1 % — SIGNIFICANT CHANGE UP (ref 0–2)
BILIRUB SERPL-MCNC: 0.5 MG/DL — SIGNIFICANT CHANGE UP (ref 0.2–1.2)
BILIRUB SERPL-MCNC: 0.5 MG/DL — SIGNIFICANT CHANGE UP (ref 0.2–1.2)
BUN SERPL-MCNC: 10 MG/DL — SIGNIFICANT CHANGE UP (ref 7–23)
BUN SERPL-MCNC: 10 MG/DL — SIGNIFICANT CHANGE UP (ref 7–23)
CALCIUM SERPL-MCNC: 9.8 MG/DL — SIGNIFICANT CHANGE UP (ref 8.4–10.5)
CALCIUM SERPL-MCNC: 9.8 MG/DL — SIGNIFICANT CHANGE UP (ref 8.4–10.5)
CHLORIDE SERPL-SCNC: 105 MMOL/L — SIGNIFICANT CHANGE UP (ref 96–108)
CHLORIDE SERPL-SCNC: 105 MMOL/L — SIGNIFICANT CHANGE UP (ref 96–108)
CO2 SERPL-SCNC: 28 MMOL/L — SIGNIFICANT CHANGE UP (ref 22–31)
CO2 SERPL-SCNC: 28 MMOL/L — SIGNIFICANT CHANGE UP (ref 22–31)
CREAT SERPL-MCNC: 0.8 MG/DL — SIGNIFICANT CHANGE UP (ref 0.5–1.3)
CREAT SERPL-MCNC: 0.8 MG/DL — SIGNIFICANT CHANGE UP (ref 0.5–1.3)
EGFR: 75 ML/MIN/1.73M2 — SIGNIFICANT CHANGE UP
EGFR: 75 ML/MIN/1.73M2 — SIGNIFICANT CHANGE UP
EOSINOPHIL # BLD AUTO: 0.43 K/UL — SIGNIFICANT CHANGE UP (ref 0–0.5)
EOSINOPHIL # BLD AUTO: 0.43 K/UL — SIGNIFICANT CHANGE UP (ref 0–0.5)
EOSINOPHIL NFR BLD AUTO: 4.2 % — SIGNIFICANT CHANGE UP (ref 0–6)
EOSINOPHIL NFR BLD AUTO: 4.2 % — SIGNIFICANT CHANGE UP (ref 0–6)
GLUCOSE BLDC GLUCOMTR-MCNC: 92 MG/DL — SIGNIFICANT CHANGE UP (ref 70–99)
GLUCOSE BLDC GLUCOMTR-MCNC: 92 MG/DL — SIGNIFICANT CHANGE UP (ref 70–99)
GLUCOSE SERPL-MCNC: 93 MG/DL — SIGNIFICANT CHANGE UP (ref 70–99)
GLUCOSE SERPL-MCNC: 93 MG/DL — SIGNIFICANT CHANGE UP (ref 70–99)
HCT VFR BLD CALC: 28.8 % — LOW (ref 34.5–45)
HCT VFR BLD CALC: 28.8 % — LOW (ref 34.5–45)
HGB BLD-MCNC: 8.2 G/DL — LOW (ref 11.5–15.5)
HGB BLD-MCNC: 8.2 G/DL — LOW (ref 11.5–15.5)
IMM GRANULOCYTES NFR BLD AUTO: 1.6 % — HIGH (ref 0–0.9)
IMM GRANULOCYTES NFR BLD AUTO: 1.6 % — HIGH (ref 0–0.9)
LYMPHOCYTES # BLD AUTO: 1.31 K/UL — SIGNIFICANT CHANGE UP (ref 1–3.3)
LYMPHOCYTES # BLD AUTO: 1.31 K/UL — SIGNIFICANT CHANGE UP (ref 1–3.3)
LYMPHOCYTES # BLD AUTO: 12.8 % — LOW (ref 13–44)
LYMPHOCYTES # BLD AUTO: 12.8 % — LOW (ref 13–44)
MAGNESIUM SERPL-MCNC: 2 MG/DL — SIGNIFICANT CHANGE UP (ref 1.6–2.6)
MAGNESIUM SERPL-MCNC: 2 MG/DL — SIGNIFICANT CHANGE UP (ref 1.6–2.6)
MCHC RBC-ENTMCNC: 22 PG — LOW (ref 27–34)
MCHC RBC-ENTMCNC: 22 PG — LOW (ref 27–34)
MCHC RBC-ENTMCNC: 28.5 GM/DL — LOW (ref 32–36)
MCHC RBC-ENTMCNC: 28.5 GM/DL — LOW (ref 32–36)
MCV RBC AUTO: 77.2 FL — LOW (ref 80–100)
MCV RBC AUTO: 77.2 FL — LOW (ref 80–100)
MONOCYTES # BLD AUTO: 0.93 K/UL — HIGH (ref 0–0.9)
MONOCYTES # BLD AUTO: 0.93 K/UL — HIGH (ref 0–0.9)
MONOCYTES NFR BLD AUTO: 9.1 % — SIGNIFICANT CHANGE UP (ref 2–14)
MONOCYTES NFR BLD AUTO: 9.1 % — SIGNIFICANT CHANGE UP (ref 2–14)
NEUTROPHILS # BLD AUTO: 7.27 K/UL — SIGNIFICANT CHANGE UP (ref 1.8–7.4)
NEUTROPHILS # BLD AUTO: 7.27 K/UL — SIGNIFICANT CHANGE UP (ref 1.8–7.4)
NEUTROPHILS NFR BLD AUTO: 71.3 % — SIGNIFICANT CHANGE UP (ref 43–77)
NEUTROPHILS NFR BLD AUTO: 71.3 % — SIGNIFICANT CHANGE UP (ref 43–77)
NRBC # BLD: 3 /100 WBCS — HIGH (ref 0–0)
NRBC # BLD: 3 /100 WBCS — HIGH (ref 0–0)
PHOSPHATE SERPL-MCNC: 2.4 MG/DL — LOW (ref 2.5–4.5)
PHOSPHATE SERPL-MCNC: 2.4 MG/DL — LOW (ref 2.5–4.5)
PLATELET # BLD AUTO: 359 K/UL — SIGNIFICANT CHANGE UP (ref 150–400)
PLATELET # BLD AUTO: 359 K/UL — SIGNIFICANT CHANGE UP (ref 150–400)
POTASSIUM SERPL-MCNC: 3.3 MMOL/L — LOW (ref 3.5–5.3)
POTASSIUM SERPL-MCNC: 3.3 MMOL/L — LOW (ref 3.5–5.3)
POTASSIUM SERPL-SCNC: 3.3 MMOL/L — LOW (ref 3.5–5.3)
POTASSIUM SERPL-SCNC: 3.3 MMOL/L — LOW (ref 3.5–5.3)
PROT SERPL-MCNC: 6.8 G/DL — SIGNIFICANT CHANGE UP (ref 6–8.3)
PROT SERPL-MCNC: 6.8 G/DL — SIGNIFICANT CHANGE UP (ref 6–8.3)
RBC # BLD: 3.73 M/UL — LOW (ref 3.8–5.2)
RBC # BLD: 3.73 M/UL — LOW (ref 3.8–5.2)
RBC # FLD: 21.2 % — HIGH (ref 10.3–14.5)
RBC # FLD: 21.2 % — HIGH (ref 10.3–14.5)
SODIUM SERPL-SCNC: 143 MMOL/L — SIGNIFICANT CHANGE UP (ref 135–145)
SODIUM SERPL-SCNC: 143 MMOL/L — SIGNIFICANT CHANGE UP (ref 135–145)
WBC # BLD: 10.2 K/UL — SIGNIFICANT CHANGE UP (ref 3.8–10.5)
WBC # BLD: 10.2 K/UL — SIGNIFICANT CHANGE UP (ref 3.8–10.5)
WBC # FLD AUTO: 10.2 K/UL — SIGNIFICANT CHANGE UP (ref 3.8–10.5)
WBC # FLD AUTO: 10.2 K/UL — SIGNIFICANT CHANGE UP (ref 3.8–10.5)

## 2023-11-16 PROCEDURE — 76937 US GUIDE VASCULAR ACCESS: CPT | Mod: 26

## 2023-11-16 PROCEDURE — 43255 EGD CONTROL BLEEDING ANY: CPT | Mod: GC

## 2023-11-16 PROCEDURE — 36000 PLACE NEEDLE IN VEIN: CPT

## 2023-11-16 PROCEDURE — 99232 SBSQ HOSP IP/OBS MODERATE 35: CPT | Mod: GC

## 2023-11-16 PROCEDURE — 88305 TISSUE EXAM BY PATHOLOGIST: CPT | Mod: 26

## 2023-11-16 RX ORDER — HYDRALAZINE HCL 50 MG
10 TABLET ORAL ONCE
Refills: 0 | Status: COMPLETED | OUTPATIENT
Start: 2023-11-16 | End: 2023-11-16

## 2023-11-16 RX ORDER — POTASSIUM CHLORIDE 20 MEQ
20 PACKET (EA) ORAL
Refills: 0 | Status: COMPLETED | OUTPATIENT
Start: 2023-11-16 | End: 2023-11-16

## 2023-11-16 RX ORDER — HYDRALAZINE HCL 50 MG
10 TABLET ORAL ONCE
Refills: 0 | Status: DISCONTINUED | OUTPATIENT
Start: 2023-11-16 | End: 2023-11-16

## 2023-11-16 RX ORDER — FERROUS SULFATE 325(65) MG
325 TABLET ORAL DAILY
Refills: 0 | Status: DISCONTINUED | OUTPATIENT
Start: 2023-11-16 | End: 2023-11-17

## 2023-11-16 RX ADMIN — Medication 650 MILLIGRAM(S): at 17:58

## 2023-11-16 RX ADMIN — ATENOLOL 25 MILLIGRAM(S): 25 TABLET ORAL at 05:39

## 2023-11-16 RX ADMIN — GABAPENTIN 300 MILLIGRAM(S): 400 CAPSULE ORAL at 14:57

## 2023-11-16 RX ADMIN — SIMVASTATIN 10 MILLIGRAM(S): 20 TABLET, FILM COATED ORAL at 22:01

## 2023-11-16 RX ADMIN — Medication 325 MILLIGRAM(S): at 22:01

## 2023-11-16 RX ADMIN — Medication 10 MILLIGRAM(S): at 14:57

## 2023-11-16 RX ADMIN — PANTOPRAZOLE SODIUM 40 MILLIGRAM(S): 20 TABLET, DELAYED RELEASE ORAL at 01:48

## 2023-11-16 RX ADMIN — QUETIAPINE FUMARATE 100 MILLIGRAM(S): 200 TABLET, FILM COATED ORAL at 22:01

## 2023-11-16 RX ADMIN — LOSARTAN POTASSIUM 100 MILLIGRAM(S): 100 TABLET, FILM COATED ORAL at 05:39

## 2023-11-16 RX ADMIN — SERTRALINE 100 MILLIGRAM(S): 25 TABLET, FILM COATED ORAL at 14:57

## 2023-11-16 RX ADMIN — Medication 50 MILLIEQUIVALENT(S): at 22:01

## 2023-11-16 RX ADMIN — Medication 650 MILLIGRAM(S): at 20:57

## 2023-11-16 RX ADMIN — Medication 50 MILLIEQUIVALENT(S): at 14:45

## 2023-11-16 NOTE — PROGRESS NOTE ADULT - PROBLEM SELECTOR PLAN 1
DDx includes most likelt UGI bleed, vs less likely hemolysis, decrease production  Last colonoscopy in 2018 with recommendation to return in 5 years (this year). No active signs of bleeding, patient's son states that she takes Naproxen and Meloxicam for back pain every other day.   Hgb 4.5 on admission (Microcytic as MCV was 70), s/p 2 units of pRBCs with increase of Hgb to 6.6. --> 6.9  Hgb is now stable above 7.  EGD 11/16 with  gstritis (biopsy) and 1 antral and 1 duodenal angioectasia (thermal therapy)    Plan:  - Monitor Hgb  - maintain active T&S, 2 large bore IV  - f/u Parvovirus B19 PCR

## 2023-11-16 NOTE — PROGRESS NOTE ADULT - PROBLEM SELECTOR PLAN 8
F: s/p 1L NS in ED 3 upRBCs  E: replete as needed  N: NPO   GI: IV PPI BID  DVT: hold iso anemia of unknown etiology  Code:  DNR/DNI, otherwise all measures  Dispo: 7[facility] F: s/p 1L NS in ED 3 upRBCs  E: replete as needed  N: Clears  GI: IV PPI BID  DVT: hold iso anemia of unknown etiology  Code:  DNR/DNI, otherwise all measures  Dispo: 7[facility]

## 2023-11-16 NOTE — CHART NOTE - NSCHARTNOTEFT_GEN_A_CORE
EGD performed in endo suite for iron deficiency anemia.    Impressions:  	Normal esophagus.  	Erythema in the stomach antrum compatible with non-erosive gastritis. (Biopsy).  	Angioectasias in the second part of the duodenum. (Thermal Therapy).  	Angioectasia in the antrum. (Thermal Therapy).    Plan:	  - Advance diet as tolerated  - Iron supplementation  - F/u pathology     Darvin ([facility]) MD Vin  Gastroenterology Fellow  Pager (M-F 7a-5p): [phone]  Pager (after hours): Please call  for on-call fellow EGD performed in endo suite for iron deficiency anemia.    Impressions:  	Normal esophagus.  	Erythema in the stomach antrum compatible with non-erosive gastritis. (Biopsy).  	Angioectasias in the second part of the duodenum. (Thermal Therapy).  	Angioectasia in the antrum. (Thermal Therapy).    Plan:	  - Advance diet as tolerated  - Iron supplementation  - F/u pathology    We will sign off, but please page if any further questions arise.    Darvin ([facility]) MD Vin  Gastroenterology Fellow  Pager (M-F 7a-5p): [phone]  Pager (after hours): Please call  for on-call fellow

## 2023-11-16 NOTE — PROGRESS NOTE ADULT - SUBJECTIVE AND OBJECTIVE BOX
SUBJECTIVE/OVERNIGHT EVENTS: No acute overnight events. Pt seen in AM at bedside, resting comfortably in bed, and does not appear to be in any acute distress. ROS was nopt completed due to patient's mental status.    VITAL SIGNS:  Vital Signs Last 24 Hrs  T(C): 37.1 (16 Nov 2023 14:41), Max: 37.1 (16 Nov 2023 14:41)  T(F): 98.7 (16 Nov 2023 14:41), Max: 98.7 (16 Nov 2023 14:41)  HR: 76 (16 Nov 2023 14:39) (70 - 80)  BP: 181/80 (16 Nov 2023 14:39) (136/69 - 181/80)  BP(mean): 115 (16 Nov 2023 14:39) (87 - 115)  RR: 18 (16 Nov 2023 14:39) (18 - 19)  SpO2: 97% (16 Nov 2023 14:39) (92% - 100%)    Parameters below as of 16 Nov 2023 14:39  Patient On (Oxygen Delivery Method): nasal cannula w/ humidification  O2 Flow (L/min): 2      PHYSICAL EXAM:  General: NAD; [place] speaking, slurred speech that is not new, able to answer questions and cooperating to exam.   HEENT: PERRL; EOMI; MMM  Neck: supple; no JVD  Cardiac: RRR; +S1/S2, no murmurs  Pulm: CTAB no w/r/r  GI: soft, NT/ND, +BS  Extremities: WWP; no edema, clubbing or cyanosis  Vasc: 2+ radial, DP pulses B/L  Neuro: AAOx2; no focal deficits    MEDICATIONS:  MEDICATIONS  (STANDING):  atenolol  Tablet 25 milliGRAM(s) Oral daily  ferrous    sulfate 325 milliGRAM(s) Oral daily  gabapentin 300 milliGRAM(s) Oral daily  losartan 100 milliGRAM(s) Oral daily  pantoprazole  Injectable 40 milliGRAM(s) IV Push every 12 hours  polyethylene glycol 3350 17 Gram(s) Oral daily  potassium chloride  20 mEq/100 mL IVPB 20 milliEquivalent(s) IV Intermittent every 2 hours  QUEtiapine 100 milliGRAM(s) Oral at bedtime  senna 2 Tablet(s) Oral at bedtime  sertraline 100 milliGRAM(s) Oral daily  simvastatin 10 milliGRAM(s) Oral at bedtime    MEDICATIONS  (PRN):  acetaminophen     Tablet .. 650 milliGRAM(s) Oral every 6 hours PRN Temp greater or equal to 38C (100.4F), Mild Pain (1 - 3)      ALLERGIES:  Allergies    No Known Allergies    Intolerances        LABS:                        8.2    10.20 )-----------( 359      ( 16 Nov 2023 05:30 )             28.8     11-16    143  |  105  |  10  ----------------------------<  93  3.3<L>   |  28  |  0.80    Ca    9.8      16 Nov 2023 05:30  Phos  2.4     11-16  Mg     2.0     11-16    TPro  6.8  /  Alb  3.9  /  TBili  0.5  /  DBili  x   /  AST  21  /  ALT  13  /  AlkPhos  107  11-16    PT/INR - ( 15 Nov 2023 05:30 )   PT: 11.8 sec;   INR: 1.04          PTT - ( 15 Nov 2023 05:30 )  PTT:26.6 sec    RADIOLOGY & ADDITIONAL TESTS: Reviewed.

## 2023-11-16 NOTE — PRE-ANESTHESIA EVALUATION ADULT - NSANTHPMHFT_GEN_ALL_CORE
78F PMHx vascular dementia, HTN, HLD, OA s/p b/l knee, hip replacements, presenting with SOB x2 weeks, worsening dementia with Hg 4.6 (now s/p transfusion) and 3 days of black stools.    As per pt's son, was short of breath for past 2 weeks and after was brought to PCP was told Hg at 4.6 and to report to ED. Transfused 2 units of blood, Hg now at 6.6 from 4.5, Hct 24.7.     DDx includes upper GI bleed secondary to peptic ulcer disease vs. erosive gastritis vs. esophagitis. Labs suggestive of MARINA with an iron sat of 4%.     Per son who is patient's HCP, he would want her to have an endoscopic evaluation if it would be of diagnostic benefit.

## 2023-11-16 NOTE — PRE-ANESTHESIA EVALUATION ADULT - NSRADCARDRESULTSFT_GEN_ALL_CORE
ACC: [id number] EXAM:  ECHO TTE WO CON COMP W DOPP                          PROCEDURE DATE:  2023          INTERPRETATION:  -----------------------------------  TRANSTHORACIC ECHOCARDIOGRAM REPORT      ---------------------------------------------------------------------------  -----  Patient Name:   VIKASH WHITTAKER Date of Exam:        2023  Medical Rec #:  [id number]         Height/Weight:       61.0 in / 189.62 lb  Account #:      [id number]         BSA:                 1.85 m²  YOB: 1945        BP:                  169/74 mmHg  Patient Age:    78 years        HR:                  69 bpm  Patient Gender: F               Sonographer:         Anna Simmons                                  Referring Physician: ELSA PAIGE      ---------------------------------------------------------------------------  -----  CPT:                ECHO TTE WITH CON COMP W DOPP - ; - 7923554.m  Indication(s):      R06.00 - Dyspnea, unspecified  Procedure:          A complete two-dimensional transthoracic   echocardiogram was                      performed: 2D, M-mode, spectral and color flow   Doppler.  Ordering Location:  [facility]    Study Quality:      Study quality was good.  Contrast Injection: Contrast injection with an imaging solution Definity   was                      performed to enhance endocardial border definition.      ---------------------------------------------------------------------------  -----  CONCLUSIONS:     1. Normal left and right ventricular size and systolic function.   2. Mild symmetric left ventricular hypertrophy.   3. Indeterminate left ventricular diastolic function.   4. Mild aortic stenosis.   5. No evidence of pulmonary hypertension.   6. No pericardial effusion.    ---------------------------------------------------------------------------  -----  2D AND M-MODE MEASUREMENTS (normal ranges within parentheses):    Left Ventricle:         Normal - Men Normal - Women  IVSd (2D):      1.10 cm  (0.6-1.0)     (0.6-0.9)  LVPWd (2D):    1.16 cm  (0.6-1.0)     (0.6-0.9)  LVIDd (2D):     4.32 cm  (4.2-5.8)     (3.8-5.2)  LVIDs (2D):     3.37 cm  Aorta/Left Atrium:         Normal - Men Normal - Women  Aortic Root (2D):  2.90 cm  (2.4-3.7)    LA Volume A/L:    Right Ventricle:    LVDIASTOLIC FUNCTION:    MV Peak E: 99.40 cm/s MV e' lat:  10.2 cm/s MV E/e' lat ratio: 9.7  MV Peak A: 62.60 cm/s MV e' med:  7.2 cm/s  MV E/e' med ratio: 13.8  E/A Ratio: 1.59       Decel Time: 211 msec  MV E/e' av.8    SPECTRAL DOPPLER ANALYSIS:    Mitral Valve:  MV Max Girma:   MV P1/2 Time: 61.19 msec  MV Mean Grad: MV Area, PHT: 3.60 cm²      Aortic Valve: AoV Max Girma:    2.29 m/s AoV Peak P mmHg  AoV Mean   P mmHg  LVOT Vmax:      1.01 m/s LVOT VTI:      0.220 m  LVOT Diameter: 2.10 cm  AoV Area, VMax: 1.53 cm² AoV Area, VTI: 1.54 cm² AoV Area, Vmn: 1.47 cm²      Tricuspid Valve and PA/RV Systolic Pressure: TR Max Velocity: RA   Pressure: 3 mmHg  RVSP/PASP:  TAPSE:           RV S':       13 cm/s      ---------------------------------------------------------------------------  -----  FINDINGS:    Left Ventricle:  There is mild concentric left ventricular hypertrophy. The left ventricle   is normal in size and systolic function with a calculated ejection   fraction of 60%. There are no regional wall motion abnormalities seen.   Analysis of mitral annular tissue Doppler, left atrial volume index, and   tricuspid regurgitant velocity reveals: indeterminate left ventricular   diastolic function and filling pressure.    Right Ventricle:  The right ventricle is normal in size. Right ventricular systolic   function is normal. RV tissue Doppler S' is 13.20 cm/s (normal >10 cm/s).    Left Atrium:  The left atrium is normal in size. Left atrial volume index (GILBERTO)is   30.0 ml/m².    Right Atrium:  The right atrium is normal in size.    Aortic Valve:  The aortic leaflets are thickened and calcified with reduced systolic   leaflet excursion. There is mild aortic stenosis. There is no evidence of   aortic regurgitation.    Mitral Valve:  Structurally normal mitral valve with normal leaflet excursion. There is   trace mitral regurgitation.    Tricuspid Valve:  Structurally normal tricuspid valve with normal leaflet excursion. There   is trace tricuspid regurgitation. There was insufficient tricuspid   regurgitation detected from which to calculate pulmonary artery systolic   pressure.    Inferior Vena Cava:  The inferior vena cava is normal in size (<2.1cm) with normal inspiratory   collapse (>50%) consistent with normal right atrial pressure (  3, range 0-5mmHg).    Pulmonic Valve:  Structurally normal pulmonic valve with normal leaflet excursion. There   is trace pulmonic regurgitation.    Aorta:  The aortic root is normal in size. The aortic arch is normal without   evidence of aortic coarctation.    Pericardium:  No pericardial effusion is seen.    ---------------------------------------------------------------------------  -----  Mike Zamorano MD

## 2023-11-16 NOTE — PACU DISCHARGE NOTE - NSPTMEETSDISCHCRITERIADT_GEN_A_CORE
16-Nov-2023 14:30
[de-identified] : Lost a total of 28 lbs last year with the gastric sleve surgery.  Was dissapointed that it did not amount to more.  \par \par Otherwise feels well.  No complaints.  In demolition, does a lot of active work.  \par \par Patient only took the atorvastatin leading up to the surgery.  \par \par Feels anxiety at night always feels like he ahs to eat.   Snacking.

## 2023-11-16 NOTE — PROGRESS NOTE ADULT - ATTENDING COMMENTS
HTN, COPD, OA, dementia with acute blood loss anemia from UGI bleed  physical as above  EGD with gastritis and angioectasia treated thermally  follow H/h on protonix  continue gabapentin, sertraline, seroquel  BP OK on losartan, got one dose hydralazine after EGD  SCDs  continue telemetry one more night

## 2023-11-16 NOTE — ANESTHESIA FOLLOW-UP NOTE - NSEVALATION_GEN_ALL_CORE
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

## 2023-11-17 DIAGNOSIS — E83.39 OTHER DISORDERS OF PHOSPHORUS METABOLISM: ICD-10-CM

## 2023-11-17 DIAGNOSIS — D50.9 IRON DEFICIENCY ANEMIA, UNSPECIFIED: ICD-10-CM

## 2023-11-17 LAB
ANION GAP SERPL CALC-SCNC: 10 MMOL/L — SIGNIFICANT CHANGE UP (ref 5–17)
ANION GAP SERPL CALC-SCNC: 10 MMOL/L — SIGNIFICANT CHANGE UP (ref 5–17)
B19V DNA FLD QL NAA+PROBE: SIGNIFICANT CHANGE UP IU/ML
B19V DNA FLD QL NAA+PROBE: SIGNIFICANT CHANGE UP IU/ML
BASOPHILS # BLD AUTO: 0.13 K/UL — SIGNIFICANT CHANGE UP (ref 0–0.2)
BASOPHILS # BLD AUTO: 0.13 K/UL — SIGNIFICANT CHANGE UP (ref 0–0.2)
BASOPHILS NFR BLD AUTO: 1.1 % — SIGNIFICANT CHANGE UP (ref 0–2)
BASOPHILS NFR BLD AUTO: 1.1 % — SIGNIFICANT CHANGE UP (ref 0–2)
BUN SERPL-MCNC: 12 MG/DL — SIGNIFICANT CHANGE UP (ref 7–23)
BUN SERPL-MCNC: 12 MG/DL — SIGNIFICANT CHANGE UP (ref 7–23)
CALCIUM SERPL-MCNC: 9.5 MG/DL — SIGNIFICANT CHANGE UP (ref 8.4–10.5)
CALCIUM SERPL-MCNC: 9.5 MG/DL — SIGNIFICANT CHANGE UP (ref 8.4–10.5)
CHLORIDE SERPL-SCNC: 104 MMOL/L — SIGNIFICANT CHANGE UP (ref 96–108)
CHLORIDE SERPL-SCNC: 104 MMOL/L — SIGNIFICANT CHANGE UP (ref 96–108)
CO2 SERPL-SCNC: 26 MMOL/L — SIGNIFICANT CHANGE UP (ref 22–31)
CO2 SERPL-SCNC: 26 MMOL/L — SIGNIFICANT CHANGE UP (ref 22–31)
CREAT SERPL-MCNC: 0.66 MG/DL — SIGNIFICANT CHANGE UP (ref 0.5–1.3)
CREAT SERPL-MCNC: 0.66 MG/DL — SIGNIFICANT CHANGE UP (ref 0.5–1.3)
EGFR: 89 ML/MIN/1.73M2 — SIGNIFICANT CHANGE UP
EGFR: 89 ML/MIN/1.73M2 — SIGNIFICANT CHANGE UP
EOSINOPHIL # BLD AUTO: 0.47 K/UL — SIGNIFICANT CHANGE UP (ref 0–0.5)
EOSINOPHIL # BLD AUTO: 0.47 K/UL — SIGNIFICANT CHANGE UP (ref 0–0.5)
EOSINOPHIL NFR BLD AUTO: 4.1 % — SIGNIFICANT CHANGE UP (ref 0–6)
EOSINOPHIL NFR BLD AUTO: 4.1 % — SIGNIFICANT CHANGE UP (ref 0–6)
GLUCOSE BLDC GLUCOMTR-MCNC: 107 MG/DL — HIGH (ref 70–99)
GLUCOSE BLDC GLUCOMTR-MCNC: 107 MG/DL — HIGH (ref 70–99)
GLUCOSE BLDC GLUCOMTR-MCNC: 96 MG/DL — SIGNIFICANT CHANGE UP (ref 70–99)
GLUCOSE BLDC GLUCOMTR-MCNC: 96 MG/DL — SIGNIFICANT CHANGE UP (ref 70–99)
GLUCOSE SERPL-MCNC: 114 MG/DL — HIGH (ref 70–99)
GLUCOSE SERPL-MCNC: 114 MG/DL — HIGH (ref 70–99)
HCT VFR BLD CALC: 28.4 % — LOW (ref 34.5–45)
HCT VFR BLD CALC: 28.4 % — LOW (ref 34.5–45)
HGB BLD-MCNC: 8 G/DL — LOW (ref 11.5–15.5)
HGB BLD-MCNC: 8 G/DL — LOW (ref 11.5–15.5)
IMM GRANULOCYTES NFR BLD AUTO: 1.2 % — HIGH (ref 0–0.9)
IMM GRANULOCYTES NFR BLD AUTO: 1.2 % — HIGH (ref 0–0.9)
LYMPHOCYTES # BLD AUTO: 1.13 K/UL — SIGNIFICANT CHANGE UP (ref 1–3.3)
LYMPHOCYTES # BLD AUTO: 1.13 K/UL — SIGNIFICANT CHANGE UP (ref 1–3.3)
LYMPHOCYTES # BLD AUTO: 9.9 % — LOW (ref 13–44)
LYMPHOCYTES # BLD AUTO: 9.9 % — LOW (ref 13–44)
MAGNESIUM SERPL-MCNC: 1.9 MG/DL — SIGNIFICANT CHANGE UP (ref 1.6–2.6)
MAGNESIUM SERPL-MCNC: 1.9 MG/DL — SIGNIFICANT CHANGE UP (ref 1.6–2.6)
MCHC RBC-ENTMCNC: 21.9 PG — LOW (ref 27–34)
MCHC RBC-ENTMCNC: 21.9 PG — LOW (ref 27–34)
MCHC RBC-ENTMCNC: 28.2 GM/DL — LOW (ref 32–36)
MCHC RBC-ENTMCNC: 28.2 GM/DL — LOW (ref 32–36)
MCV RBC AUTO: 77.8 FL — LOW (ref 80–100)
MCV RBC AUTO: 77.8 FL — LOW (ref 80–100)
MONOCYTES # BLD AUTO: 0.9 K/UL — SIGNIFICANT CHANGE UP (ref 0–0.9)
MONOCYTES # BLD AUTO: 0.9 K/UL — SIGNIFICANT CHANGE UP (ref 0–0.9)
MONOCYTES NFR BLD AUTO: 7.9 % — SIGNIFICANT CHANGE UP (ref 2–14)
MONOCYTES NFR BLD AUTO: 7.9 % — SIGNIFICANT CHANGE UP (ref 2–14)
NEUTROPHILS # BLD AUTO: 8.68 K/UL — HIGH (ref 1.8–7.4)
NEUTROPHILS # BLD AUTO: 8.68 K/UL — HIGH (ref 1.8–7.4)
NEUTROPHILS NFR BLD AUTO: 75.8 % — SIGNIFICANT CHANGE UP (ref 43–77)
NEUTROPHILS NFR BLD AUTO: 75.8 % — SIGNIFICANT CHANGE UP (ref 43–77)
NRBC # BLD: 2 /100 WBCS — HIGH (ref 0–0)
NRBC # BLD: 2 /100 WBCS — HIGH (ref 0–0)
PHOSPHATE SERPL-MCNC: 1.8 MG/DL — LOW (ref 2.5–4.5)
PHOSPHATE SERPL-MCNC: 1.8 MG/DL — LOW (ref 2.5–4.5)
PLATELET # BLD AUTO: 360 K/UL — SIGNIFICANT CHANGE UP (ref 150–400)
PLATELET # BLD AUTO: 360 K/UL — SIGNIFICANT CHANGE UP (ref 150–400)
POTASSIUM SERPL-MCNC: 3.5 MMOL/L — SIGNIFICANT CHANGE UP (ref 3.5–5.3)
POTASSIUM SERPL-MCNC: 3.5 MMOL/L — SIGNIFICANT CHANGE UP (ref 3.5–5.3)
POTASSIUM SERPL-SCNC: 3.5 MMOL/L — SIGNIFICANT CHANGE UP (ref 3.5–5.3)
POTASSIUM SERPL-SCNC: 3.5 MMOL/L — SIGNIFICANT CHANGE UP (ref 3.5–5.3)
RBC # BLD: 3.65 M/UL — LOW (ref 3.8–5.2)
RBC # BLD: 3.65 M/UL — LOW (ref 3.8–5.2)
RBC # FLD: 22.5 % — HIGH (ref 10.3–14.5)
RBC # FLD: 22.5 % — HIGH (ref 10.3–14.5)
SODIUM SERPL-SCNC: 140 MMOL/L — SIGNIFICANT CHANGE UP (ref 135–145)
SODIUM SERPL-SCNC: 140 MMOL/L — SIGNIFICANT CHANGE UP (ref 135–145)
SURGICAL PATHOLOGY STUDY: SIGNIFICANT CHANGE UP
SURGICAL PATHOLOGY STUDY: SIGNIFICANT CHANGE UP
T4 FREE SERPL-MCNC: 1.26 NG/DL — SIGNIFICANT CHANGE UP (ref 0.93–1.7)
T4 FREE SERPL-MCNC: 1.26 NG/DL — SIGNIFICANT CHANGE UP (ref 0.93–1.7)
WBC # BLD: 11.45 K/UL — HIGH (ref 3.8–10.5)
WBC # BLD: 11.45 K/UL — HIGH (ref 3.8–10.5)
WBC # FLD AUTO: 11.45 K/UL — HIGH (ref 3.8–10.5)
WBC # FLD AUTO: 11.45 K/UL — HIGH (ref 3.8–10.5)

## 2023-11-17 PROCEDURE — 99232 SBSQ HOSP IP/OBS MODERATE 35: CPT | Mod: GC

## 2023-11-17 PROCEDURE — 99223 1ST HOSP IP/OBS HIGH 75: CPT | Mod: GC,AI

## 2023-11-17 RX ORDER — LIDOCAINE 4 G/100G
1 CREAM TOPICAL EVERY 24 HOURS
Refills: 0 | Status: DISCONTINUED | OUTPATIENT
Start: 2023-11-17 | End: 2023-11-18

## 2023-11-17 RX ORDER — HYDRALAZINE HCL 50 MG
10 TABLET ORAL ONCE
Refills: 0 | Status: COMPLETED | OUTPATIENT
Start: 2023-11-17 | End: 2023-11-17

## 2023-11-17 RX ORDER — SENNA PLUS 8.6 MG/1
2 TABLET ORAL AT BEDTIME
Refills: 0 | Status: DISCONTINUED | OUTPATIENT
Start: 2023-11-17 | End: 2023-11-17

## 2023-11-17 RX ORDER — ASPIRIN/CALCIUM CARB/MAGNESIUM 324 MG
81 TABLET ORAL DAILY
Refills: 0 | Status: DISCONTINUED | OUTPATIENT
Start: 2023-11-17 | End: 2023-11-18

## 2023-11-17 RX ORDER — THIAMINE MONONITRATE (VIT B1) 100 MG
500 TABLET ORAL DAILY
Refills: 0 | Status: DISCONTINUED | OUTPATIENT
Start: 2023-11-17 | End: 2023-11-18

## 2023-11-17 RX ORDER — FERROUS SULFATE 325(65) MG
325 TABLET ORAL
Refills: 0 | Status: DISCONTINUED | OUTPATIENT
Start: 2023-11-17 | End: 2023-11-18

## 2023-11-17 RX ORDER — HYDRALAZINE HCL 50 MG
10 TABLET ORAL ONCE
Refills: 0 | Status: DISCONTINUED | OUTPATIENT
Start: 2023-11-17 | End: 2023-11-17

## 2023-11-17 RX ORDER — ACETAMINOPHEN 500 MG
650 TABLET ORAL EVERY 6 HOURS
Refills: 0 | Status: DISCONTINUED | OUTPATIENT
Start: 2023-11-17 | End: 2023-11-18

## 2023-11-17 RX ORDER — POLYETHYLENE GLYCOL 3350 17 G/17G
17 POWDER, FOR SOLUTION ORAL DAILY
Refills: 0 | Status: DISCONTINUED | OUTPATIENT
Start: 2023-11-17 | End: 2023-11-17

## 2023-11-17 RX ORDER — ATENOLOL 25 MG/1
25 TABLET ORAL DAILY
Refills: 0 | Status: DISCONTINUED | OUTPATIENT
Start: 2023-11-17 | End: 2023-11-17

## 2023-11-17 RX ADMIN — PANTOPRAZOLE SODIUM 40 MILLIGRAM(S): 20 TABLET, DELAYED RELEASE ORAL at 13:38

## 2023-11-17 RX ADMIN — SENNA PLUS 2 TABLET(S): 8.6 TABLET ORAL at 21:27

## 2023-11-17 RX ADMIN — Medication 650 MILLIGRAM(S): at 10:05

## 2023-11-17 RX ADMIN — PANTOPRAZOLE SODIUM 40 MILLIGRAM(S): 20 TABLET, DELAYED RELEASE ORAL at 23:50

## 2023-11-17 RX ADMIN — PANTOPRAZOLE SODIUM 40 MILLIGRAM(S): 20 TABLET, DELAYED RELEASE ORAL at 00:01

## 2023-11-17 RX ADMIN — Medication 10 MILLIGRAM(S): at 17:06

## 2023-11-17 RX ADMIN — SIMVASTATIN 10 MILLIGRAM(S): 20 TABLET, FILM COATED ORAL at 21:27

## 2023-11-17 RX ADMIN — Medication 325 MILLIGRAM(S): at 13:38

## 2023-11-17 RX ADMIN — GABAPENTIN 300 MILLIGRAM(S): 400 CAPSULE ORAL at 13:38

## 2023-11-17 RX ADMIN — ATENOLOL 25 MILLIGRAM(S): 25 TABLET ORAL at 05:04

## 2023-11-17 RX ADMIN — Medication 650 MILLIGRAM(S): at 18:45

## 2023-11-17 RX ADMIN — QUETIAPINE FUMARATE 100 MILLIGRAM(S): 200 TABLET, FILM COATED ORAL at 21:28

## 2023-11-17 RX ADMIN — Medication 650 MILLIGRAM(S): at 23:50

## 2023-11-17 RX ADMIN — Medication 62.5 MILLIMOLE(S): at 20:02

## 2023-11-17 RX ADMIN — SERTRALINE 100 MILLIGRAM(S): 25 TABLET, FILM COATED ORAL at 13:37

## 2023-11-17 RX ADMIN — Medication 650 MILLIGRAM(S): at 11:29

## 2023-11-17 RX ADMIN — LOSARTAN POTASSIUM 100 MILLIGRAM(S): 100 TABLET, FILM COATED ORAL at 05:04

## 2023-11-17 RX ADMIN — LIDOCAINE 1 PATCH: 4 CREAM TOPICAL at 10:11

## 2023-11-17 RX ADMIN — LIDOCAINE 1 PATCH: 4 CREAM TOPICAL at 22:33

## 2023-11-17 NOTE — PHYSICAL THERAPY INITIAL EVALUATION ADULT - PLANNED THERAPY INTERVENTIONS, PT EVAL
Patient arrived to labor and delivery for induction of labor. EFM & TOCO applied to soft non tender abdomen. Dr. Gar Manual made aware of patient arrival and cervical exam, orders received to start low dose pitocin, increase pitocin at midnight.  Sonali Abraham balance training/bed mobility training/gait training/neuromuscular re-education/strengthening 1.75

## 2023-11-17 NOTE — PROGRESS NOTE ADULT - ATTENDING COMMENTS
78 year old woman with hx of vascular dementia (AAO to self at baseline), HTN, HLD, OA s/p bilateral knee, hip replacements, admitted to hospital for outpatient labs showing Hgb 4.5. Underwent EGD on 11/16 which showed gastritis (s/p biopsy), angioectasias in antrum and duodenum (s/p thermotherapy). Hospital course complicated by pulmonary edema (thought to be 2/2 volume from pRBCs, improved after IV diuresis)      # Acute blood loss anemia  # Iron deficiency   s/p EGD, thermal therapy for duodenal angioectasias   [ ] Iron sucrose 300mg IV qd x 3 doses (does not have to complete course before dc)   get 78 year old woman with hx of vascular dementia (AAO to self at baseline), HTN, HLD, OA s/p bilateral knee, hip replacements, admitted to hospital for outpatient labs showing Hgb 4.5. Underwent EGD on 11/16 which showed gastritis (s/p biopsy), angioectasias in antrum and duodenum (s/p thermotherapy). Hospital course complicated by pulmonary edema (thought to be 2/2 volume from pRBCs, improved after IV diuresis)      # Acute blood loss anemia  # Iron deficiency   s/p EGD, thermal therapy for duodenal angioectasias   [ ] Iron sucrose 300mg IV qd x 3 doses  while inpatient (does not have to complete course before dc)   [ ] ferrous sulfate 324mg every other day on discharge .  miralax if constipated - recs discussed with son     # Gastritis   Advised family to avoid NSAIDs, meloxicam, naproxen (was taking meloxicam, naproxen for back pain)   Trial tylenol, staying below upper daily limit   EGD, biopsy pathology results discussed with GI fellow. No Polyps seen on EGD. no further workup recommended.   [ ] pantoprazole BID x 2 more weeks, followed by pantoprazole qd thereafter    # HTN   - on home losartan 100mg qd   [ ] home atenolol 25mg qHS resumed on 11.17.     # Vascular dementia   Continue workup outpatient   Aspirin resumed post endoscopy     # DVT ppx - already on aspirin, adequate ppx. ; given recent GI bleed, no additional pharm ppx.     Dispo   - Likely medically ready on Saturday if Vitals, CBC stable  [ ] per surescripts - was on furosemide earlier in the year. (also on outpatient cardiology med list in Nov 2023) clarify with family if still on furosemide 20mg BID, if so, resume. ---son: Trever Lamb - [phone]  [ ] SW/CM to help with reinstating home health aide      High MDM   Acute illness: Acute blood loss anemia   Close Monitoring of Hgb on PPI, pRBC transfusions  Reviewed, Hgb, ferritin, iron sat, Cr  Discussed EGD findings with GI fellow on call 78 year old woman with hx of vascular dementia (AAO to self at baseline), HTN, HLD, OA s/p bilateral knee, hip replacements, admitted to hospital for outpatient labs showing Hgb 4.5. Underwent EGD on 11/16 which showed gastritis (s/p biopsy), angioectasias in antrum and duodenum (s/p thermotherapy). Hospital course complicated by pulmonary edema (thought to be 2/2 volume from pRBCs, improved after IV diuresis)      # Acute blood loss anemia  # Iron deficiency   s/p EGD, thermal therapy for duodenal angioectasias   [ ] Iron sucrose 300mg IV qd x 3 doses  while inpatient (does not have to complete course before dc)   [ ] ferrous sulfate 324mg every other day on discharge .  miralax if constipated - recs discussed with son     # Gastritis   Advised family to avoid NSAIDs, meloxicam, naproxen (was taking meloxicam, naproxen for back pain)   Trial tylenol, staying below upper daily limit   EGD, biopsy pathology results discussed with GI fellow. No Polyps seen on EGD. no further workup recommended.   [ ] pantoprazole BID x 2 more weeks, followed by pantoprazole qd thereafter    # HTN   - on home losartan 100mg qd   [ ] home atenolol 25mg qHS resumed on 11.17.     Dispo:   Likely medically ready Saturday, if Hgb stable, BP stable.   patient lives with son and son in law, has HHA 10am---6pm mon-sat, . Sons take care of her at night and on Sundays   son called HHA agency on Friday, possible to reinstate aide on Saturday   family able to care for her at home, ---likely to home on Saturday --more familiar environment, better for orientation given dementia  Has good support at home, but could benefit from even more hours of HHA     # Vascular dementia   Continue workup outpatient   Aspirin resumed post endoscopy     # DVT ppx - already on aspirin, adequate ppx. ; given recent GI bleed, no additional pharm ppx.     Dispo   - Likely medically ready on Saturday if Vitals, CBC stable  [ ] per surescripts - was on furosemide earlier in the year. (also on outpatient cardiology med list in Nov 2023) clarify with family if still on furosemide 20mg BID, if so, resume. ---son: Trever Lamb - [phone]  [ ] SW/CM to help with reinstating home health aide      High MDM   Acute illness: Acute blood loss anemia   Close Monitoring of Hgb on PPI, pRBC transfusions  Reviewed, Hgb, ferritin, iron sat, Cr  Discussed EGD findings with GI fellow on call 78 year old woman with hx of vascular dementia (AAO to self at baseline), HTN, HLD, OA s/p bilateral knee, hip replacements, admitted to hospital for outpatient labs showing Hgb 4.5. Underwent EGD on 11/16 which showed gastritis (s/p biopsy), angioectasias in antrum and duodenum (s/p thermotherapy). Hospital course complicated by pulmonary edema (thought to be 2/2 volume from pRBCs, improved after IV diuresis)      # Acute blood loss anemia  # Iron deficiency   s/p EGD, thermal therapy for duodenal angioectasias   [ ] Iron sucrose 300mg IV qd---received 1 dose  [ ] ferrous sulfate 324mg every other day on discharge .  miralax if constipated - recs discussed with son     # Gastritis   Advised family to avoid NSAIDs, meloxicam, naproxen (was taking meloxicam, naproxen for back pain)   Trial tylenol, staying below upper daily limit   EGD, biopsy pathology results discussed with GI fellow. No Polyps seen on EGD. no further workup recommended.   [ ] pantoprazole BID x 2 more weeks, followed by pantoprazole qd thereafter    # HTN   - on home losartan 100mg qd   [ ] home atenolol 25mg qHS resumed on 11.17.     Dispo:   Likely medically ready Saturday, if Hgb stable, BP stable.   patient lives with son and son in law, has HHA 10am---6pm mon-sat, . Sons take care of her at night and on Sundays   son called HHA agency on Friday, possible to reinstate aide on Saturday   family able to care for her at home, ---likely to home on Saturday --more familiar environment, better for orientation given dementia  Has good support at home, but could benefit from even more hours of HHA     # Vascular dementia   Continue workup outpatient   Aspirin resumed post endoscopy     # DVT ppx - already on aspirin, adequate ppx. ; given recent GI bleed, no additional pharm ppx.     Dispo   - Likely medically ready on Saturday if Vitals, CBC stable  [ ] per surescripts - was on furosemide earlier in the year. (also on outpatient cardiology med list in Nov 2023) clarify with family if still on furosemide 20mg BID, if so, resume. ---son: Trever Lamb - [phone]  [ ] SW/CM to help with reinstating home health aide      High MDM   Acute illness: Acute blood loss anemia   Close Monitoring of Hgb on PPI, pRBC transfusions  Reviewed, Hgb, ferritin, iron sat, Cr  Discussed EGD findings with GI fellow on call 78 year old woman with hx of vascular dementia (AAO to self at baseline), HTN, HLD, OA s/p bilateral knee, hip replacements, admitted to hospital for outpatient labs showing Hgb 4.5. Underwent EGD on 11/16 which showed gastritis (s/p biopsy), angioectasias in antrum and duodenum (s/p thermotherapy). Hospital course complicated by pulmonary edema (thought to be 2/2 volume from pRBCs, improved after IV diuresis)      # Acute blood loss anemia  # Iron deficiency   s/p EGD, thermal therapy for duodenal angioectasias   [ ] Iron sucrose 300mg IV qd---received 1 dose  [ ] ferrous sulfate 324mg every other day on discharge .  miralax if constipated - recs discussed with son     # Gastritis   Advised family to avoid NSAIDs, meloxicam, naproxen (was taking meloxicam, naproxen for back pain)   Trial tylenol, staying below upper daily limit   EGD, biopsy pathology results discussed with GI fellow. No Polyps seen on EGD. no further workup recommended.   [ ] pantoprazole BID x 2 more weeks, followed by pantoprazole qd thereafter    # HTN   - on home losartan 100mg qd   [ ] home atenolol 25mg qHS resumed on 11.17.         # Vascular dementia   Continue workup outpatient   Aspirin resumed post endoscopy     # DVT ppx - already on aspirin, adequate ppx. ; given recent GI bleed, no additional pharm ppx.     Dispo   - Likely medically ready on Saturday if Vitals, CBC stable  [ ] per surescripts - was on furosemide earlier in the year. (also on outpatient cardiology med list in Nov 2023) clarify with family if still on furosemide 20mg BID, if so, resume. ---son: Trever Lamb - [phone]  [ ] SW/CM to help with reinstating home health aide  [ ] Family asking if help available to schedule transport home if insurance benefit available. otherwise, could take Wheelchair accessible [employer] home.     patient lives with son and son in law, has HHA 10am---6pm mon-sat, . Sons take care of her at night and on Sundays   son called HHA agency on Friday, possible to reinstate aide on Saturday   family able to care for her at home, ---likely to home on Saturday --more familiar environment, better for orientation given dementia  Has good support at home, but could benefit from even more hours of HHA         High MDM   Acute illness: Acute blood loss anemia   Close Monitoring of Hgb on PPI, pRBC transfusions  Reviewed, Hgb, ferritin, iron sat, Cr  Discussed EGD findings with GI fellow on call

## 2023-11-17 NOTE — PROGRESS NOTE ADULT - ATTENDING COMMENTS
Dementia, HTN, OA with acute blood loss anemia from UGI bleed with gastritis and vascular ectasia  physical as above  H/H stable  continue protonix  tylenol, gabapentin and lidocaine patch for back pain, avoid NSAI  continue losartan  continue seroquel and sertraline

## 2023-11-17 NOTE — PROGRESS NOTE ADULT - PROBLEM SELECTOR PLAN 3
H/o HTN, takes losartan 100 qd. Found to have /66 in ED that increased to 211/105, likely 2/2 pain. Received hydral 10 and toradol 15; BP improved to 140's. Now s/p home dose losartan and Lasix for Flush Pulmonary Edema   Plan:  - c/w home losartan 100mg  - pain control

## 2023-11-17 NOTE — PHYSICAL THERAPY INITIAL EVALUATION ADULT - ADDITIONAL COMMENTS
Per HHA/caregiver report, pt previously living in elevator apartment, HHA m-f 7 hrs daily, 3 hrs saturdays. Pt ambulates very short distances using RW and assistance within the home. W/C for all other mobility.

## 2023-11-17 NOTE — PROGRESS NOTE ADULT - PROBLEM SELECTOR PLAN 8
F: s/p 1L NS in ED 3 upRBCs  E: replete as needed  N: minced and moist   GI: IV PPI BID  DVT: hold iso anemia of unknown etiology  Code:  DNR/DNI, otherwise all measures  Dispo: [facility] #chronic back pain  h/o b/ knee and L shoulder joint replacement. Noted to have difficulty w/ ambulation over last 2 weeks. Typically ambulates w/ walker short distance w/ PT.   Plan:  - tylenol 650 q6 PRN  - c/w gabapentin 300 BID  - PT consult rec [facility] Hgb A1C on 11/13 5.8%.   Plan:  - mISS

## 2023-11-17 NOTE — PROGRESS NOTE ADULT - SUBJECTIVE AND OBJECTIVE BOX
***Transfer from medicine telemetry to medicine floors***  78F PMHx vascular dementia, HTN, HLD, OA s/p b/l knee, hip replacements presenting to the hospital due to outpatient labs showing Hgb of 4.5. The patient is unable to provide history due to dementia, therefore the patient's son is the main source of history. The patient has been feeling SOB, weakness and worsening demential for the past 2-3 weeks. No recent dark stools or bright blood noted in stools. On arrival, Hgb was 4.4 for which the patient received 2 units of pRBC. The patient was noted with crackles on exam in AM and was given Lasix for Flash Pulmonary edema. Repeat Hgb showed proper improvement (6.6). The patient was found with hypoxia on arrival and was placed on HFNC which was weaned to 6L NC in AM. The patient was also noted with Hypertension for which she was given Hydralazine in the ED, losartan in the AM and is also s/p Lasix. Her hypoxia was due to pulmonary edema 2/2/ volume from repeat transfusions. She is stable on room air s/p lasix 20 mg IVx3. She required one more pRBC before her EGD on 11/16. EGD: gastritis (biopsy) angioectasias (thermotherapy) in antrum and duodenum. She is now hemodynamically stable with stable hgb and ready for stepdown.    SUBJECTIVE/OVERNIGHT EVENTS: No acute overnight events. Pt seen in AM at bedside, resting comfortably in bed, and does not appear to be in any acute distress. ROS could not be completed due to patient's mental status.    VITAL SIGNS:  Vital Signs Last 24 Hrs  T(C): 36.8 (17 Nov 2023 10:22), Max: 37.1 (16 Nov 2023 14:41)  T(F): 98.3 (17 Nov 2023 10:22), Max: 98.7 (16 Nov 2023 14:41)  HR: 82 (17 Nov 2023 04:00) (74 - 82)  BP: 167/72 (17 Nov 2023 04:00) (155/72 - 181/80)  BP(mean): 104 (17 Nov 2023 04:00) (99 - 115)  RR: 18 (17 Nov 2023 04:00) (16 - 18)  SpO2: 93% (17 Nov 2023 04:00) (91% - 97%)    Parameters below as of 17 Nov 2023 04:00  Patient On (Oxygen Delivery Method): room air        PHYSICAL EXAM:  General: NAD; Sami speaking, slurred speech that is not new, able to answer questions and cooperating to exam.   HEENT: PERRL; EOMI; MMM  Neck: supple; no JVD  Cardiac: RRR; +S1/S2, no murmurs  Pulm: CTAB no w/r/r  GI: soft, NT/ND, +BS  Extremities: WWP; no edema, clubbing or cyanosis  Vasc: 2+ radial, DP pulses B/L  Neuro: AAOx2; no focal deficits    MEDICATIONS:  MEDICATIONS  (STANDING):  acetaminophen     Tablet .. 650 milliGRAM(s) Oral every 6 hours  atenolol  Tablet 25 milliGRAM(s) Oral daily  ferrous    sulfate 325 milliGRAM(s) Oral daily  gabapentin 300 milliGRAM(s) Oral daily  lidocaine   4% Patch 1 Patch Transdermal every 24 hours  losartan 100 milliGRAM(s) Oral daily  pantoprazole  Injectable 40 milliGRAM(s) IV Push every 12 hours  polyethylene glycol 3350 17 Gram(s) Oral daily  QUEtiapine 100 milliGRAM(s) Oral at bedtime  senna 2 Tablet(s) Oral at bedtime  sertraline 100 milliGRAM(s) Oral daily  simvastatin 10 milliGRAM(s) Oral at bedtime    MEDICATIONS  (PRN):      ALLERGIES:  Allergies    No Known Allergies    Intolerances        LABS:                        8.0    11.45 )-----------( 360      ( 17 Nov 2023 05:30 )             28.4     11-17    140  |  104  |  12  ----------------------------<  114<H>  3.5   |  26  |  0.66    Ca    9.5      17 Nov 2023 05:30  Phos  1.8     11-17  Mg     1.9     11-17    TPro  6.8  /  Alb  3.9  /  TBili  0.5  /  DBili  x   /  AST  21  /  ALT  13  /  AlkPhos  107  11-16        RADIOLOGY & ADDITIONAL TESTS: Reviewed.

## 2023-11-17 NOTE — PROGRESS NOTE ADULT - PROBLEM SELECTOR PLAN 2
RESOLVED  Patient with acute pulmnmary edema requiring O2 likely 2/2 repeat transfusions. TTE with LVH, likey indicating reduced compliance and preload tolerance.  s/p Lasix 20 mg IV x2. Blines bilaterally on POCUS 11/15. given 1 more lasix 20. Now euvolemic.    -Monitor

## 2023-11-17 NOTE — PROGRESS NOTE ADULT - PROBLEM SELECTOR PLAN 1
DDx includes most likelt UGI bleed, vs less likely hemolysis, decrease production  Last colonoscopy in 2018 with recommendation to return in 5 years (this year). No active signs of bleeding, patient's son states that she takes Naproxen and Meloxicam for back pain every other day.   Hgb 4.5 on admission (Microcytic as MCV was 70), s/p 2 units of pRBCs with increase of Hgb to 6.6. --> 6.9  Hgb is now stable above 7.  EGD 11/16 with  gastritis (biopsy) and 1 antral and 1 duodenal angioectasia (thermal therapy)    Plan:  - Monitor Hgb  - maintain active T&S, 2 large bore IV DDx includes most likelt UGI bleed, vs less likely hemolysis, decrease production  Last colonoscopy in 2018 with recommendation to return in 5 years (this year). No active signs of bleeding, patient's son states that she takes Naproxen and Meloxicam for back pain every other day.   Hgb 4.5 on admission (Microcytic as MCV was 70), s/p 2 units of pRBCs with increase of Hgb to 6.6. --> 6.9  Hgb is now stable above 8.   EGD 11/16 with  gastritis (biopsy) and 1 antral and 1 duodenal angioectasia (thermal therapy)    Plan:  - Monitor Hgb  - maintain active T&S, 2 large bore IV DDx includes most likelt UGI bleed, vs less likely hemolysis, decrease production  Last colonoscopy in 2018 with recommendation to return in 5 years (this year). No active signs of bleeding, patient's son states that she takes Naproxen and Meloxicam for back pain every other day.   Hgb 4.5 on admission (Microcytic as MCV was 70), s/p 2 units of pRBCs with increase of Hgb to 6.6. --> 6.9  Hgb is now stable above 8.   EGD 11/16 with  gastritis (biopsy) and 1 antral and 1 duodenal angioectasia (thermal therapy)    Plan:  - Monitor Hgb  - maintain active T&S, 2 large bore IV  [ ]  resume aspirin if OK with GI (hypodensities, chronic microvascular disease on CT head) Last colonoscopy in 2018 with recommendation to return in 5 years (this year).   EGD 11/16 with  gastritis (biopsy) and 1 antral and 1 duodenal angioectasia (thermal therapy)  patient's son states that she takes Naproxen and Meloxicam for back pain every other day.   Hgb 4.5 on admission (Microcytic as MCV was 70), s/p 2 units of pRBCs with increase of Hgb to 6.6. --> 6.9  Hgb is now stable above 8.     Plan:  - Monitor Hgb  - maintain active T&S, 2 large bore IV  - favor continuing aspirin if tolerated, patient has hypodensities, chronic microvascular disease on CT head).   - per GI, OK to resume aspirin after EGD   - aspirin resumed 11.17 Last colonoscopy in 2018 with recommendation to return in 5 years (this year).   EGD 11/16 with  gastritis (biopsy) and 1 antral and 1 duodenal angioectasia (thermal therapy)  patient's son states that she takes Naproxen and Meloxicam for back pain every other day.   Hgb 4.5 on admission (Microcytic as MCV was 70), s/p 2 units of pRBCs with increase of Hgb to 6.6. --> 6.9  Hgb is now stable above 8.     Plan:  - Monitor Hgb  - maintain active T&S, 2 large bore IV  - favor continuing aspirin if tolerated, patient has hypodensities, chronic microvascular disease on CT head).   - per GI, OK to resume aspirin after EGD   - aspirin resumed 11.17    Gastritis  [ ] pantoprazole PO BID x 2 more weeks, followed by pantoprazole qd thereafter (needs new script)

## 2023-11-17 NOTE — PROGRESS NOTE ADULT - PROBLEM SELECTOR PLAN 3
RESOLVED  Patient with acute pulmnmary edema requiring O2 likely 2/2 repeat transfusions. TTE with LVH, likey indicating reduced compliance and preload tolerance.  s/p Lasix 20 mg IV x2. Blines bilaterally on POCUS 11/15. given 1 more lasix 20. Now euvolemic.    -Monitor H/o HTN, takes losartan 100 qd. Found to have /66 in ED that increased to 211/105, likely 2/2 pain. Received hydral 10 and toradol 15; BP improved to 140's. Now s/p home dose losartan and Lasix for Flush Pulmonary Edema   Plan:  - c/w home losartan 100mg  - pain control  [ ] resume atenolol  (home med) 25mg qhs H/o HTN, takes losartan 100 qd. Found to have /66 in ED that increased to 211/105, likely 2/2 pain. Received hydral 10 and toradol 15; BP improved to 140's. Now s/p home dose losartan and Lasix for Flush Pulmonary Edema   Plan:  - c/w home losartan 100mg  - pain control  [ ] resume atenolol  (home med) 25mg qhs    [ ] per surescripts - was on furosemide earlier in the year. (also on outpatient cardiology med list in Nov 2023) clarify with family if still on furosemide 20mg BID, if so, resume. -----------son: Martell Lamb - [phone]

## 2023-11-17 NOTE — PROGRESS NOTE ADULT - PROBLEM SELECTOR PLAN 9
F: s/p 1L NS in ED 3 upRBCs  E: replete as needed  N: minced and moist   GI: IV PPI BID  DVT: hold iso anemia of unknown etiology  Code:  DNR/DNI, otherwise all measures  Dispo: NATAN #chronic back pain  h/o b/ knee and L shoulder joint replacement. Noted to have difficulty w/ ambulation over last 2 weeks. Typically ambulates w/ walker short distance w/ PT.   Plan:  - tylenol 650 q6 PRN  - c/w gabapentin 300 BID  - PT consult rec NATAN

## 2023-11-17 NOTE — PROGRESS NOTE ADULT - PROBLEM SELECTOR PLAN 1
DDx includes most likelt UGI bleed, vs less likely hemolysis, decrease production  Last colonoscopy in 2018 with recommendation to return in 5 years (this year). No active signs of bleeding, patient's son states that she takes Naproxen and Meloxicam for back pain every other day.   Hgb 4.5 on admission (Microcytic as MCV was 70), s/p 2 units of pRBCs with increase of Hgb to 6.6. --> 6.9  Hgb is now stable above 7.  EGD 11/16 with  gastritis (biopsy) and 1 antral and 1 duodenal angioectasia (thermal therapy)    Plan:  - Monitor Hgb  - maintain active T&S, 2 large bore IV

## 2023-11-17 NOTE — PROGRESS NOTE ADULT - PROBLEM SELECTOR PLAN 4
Followed by Dr. Ronel Alvarado. Noted to have severe behavioral disturbances (paranoid delusions, hallucinations). Per son, recent MRI outpt. showed signs of vascular dementia. AAOx0-1 at baseline. AAOx2 on exam.   Plan:  - c/w sertraline 50 qd  - c/w Seroquel 100 qd RESOLVED  Patient with acute pulmnmary edema requiring O2 likely 2/2 repeat transfusions. TTE with LVH, likey indicating reduced compliance and preload tolerance.  s/p Lasix 20 mg IV x2. Blines bilaterally on POCUS 11/15. given 1 more lasix 20. Now euvolemic.    -Monitor

## 2023-11-17 NOTE — PHYSICAL THERAPY INITIAL EVALUATION ADULT - PERTINENT HX OF CURRENT PROBLEM, REHAB EVAL
78F presenting with worsening dementia and symptomatic anemia. 78F PMHx vascular dementia, HTN, HLD, COPD, OA, presenting with symptomatic anemia (SOB, fatigued, pale, dizzy) x 2 weeks, was seen by PCP and advised to come to ED after labs showed Hgb 4.6, being admitted to [facility] for management of anemia and medical workup.

## 2023-11-17 NOTE — PROGRESS NOTE ADULT - PROBLEM SELECTOR PLAN 7
#chronic back pain  h/o b/ knee and L shoulder joint replacement. Noted to have difficulty w/ ambulation over last 2 weeks. Typically ambulates w/ walker short distance w/ PT.   Plan:  - tylenol 650 q6 PRN  - c/w gabapentin 300 BID  - PT consult rec NATAN no apparent Hgb A1C on 11/13 5.8%.   Plan:  - mISS Phos 1.8-repleated  -Cont to monitor

## 2023-11-17 NOTE — PROGRESS NOTE ADULT - PROBLEM SELECTOR PLAN 4
Followed by Dr. oRnel Alvarado. Noted to have severe behavioral disturbances (paranoid delusions, hallucinations). Per son, recent MRI outpt. showed signs of vascular dementia. AAOx0-1 at baseline. AAOx2 on exam.   Plan:  - c/w sertraline 50 qd  - c/w Seroquel 100 qd

## 2023-11-17 NOTE — PROGRESS NOTE ADULT - PROBLEM SELECTOR PLAN 2
H/o HTN, takes losartan 100 qd. Found to have /66 in ED that increased to 211/105, likely 2/2 pain. Received hydral 10 and toradol 15; BP improved to 140's. Now s/p home dose losartan and Lasix for Flush Pulmonary Edema   Plan:  - c/w home losartan 100mg  - pain control Iron panel significant iron deficiency anemia.   -last prbc on 11/15  -repeat iron panel sent. Iron panel significant iron deficiency anemia.   -last prbc on 11/15  -repeat iron panel sent.    [ ] Iron sucrose 300mg IV qd x 3 doses  while inpatient (does not have to complete course before dc)   [ ] ferrous sulfate 324mg every other day on discharge .  miralax if constipated - recs discussed with son (family requesting prescription for iron tabs) Iron panel significant iron deficiency anemia.   -last prbc on 11/15  -repeat iron panel sent.    [ ] Iron sucrose 300mg IV (received 1 dose)   [ ] ferrous sulfate 324mg every other day on discharge .  miralax if constipated - recs discussed with son (family requesting prescription for iron tabs)  [ ] recheck iron levels outpatient

## 2023-11-17 NOTE — PHYSICAL THERAPY INITIAL EVALUATION ADULT - GAIT DEVIATIONS NOTED, PT EVAL
decreased shilpi/increased time in double stance/decreased step length/decreased weight-shifting ability

## 2023-11-17 NOTE — ED ADULT NURSE NOTE - ISAR SCREEN NOT DUE TO
Follow up sent to provider.    Completed form will be faxed to [employer], [facility], [phone] and sent to the patient via [employer].       [employer]

## 2023-11-17 NOTE — PROGRESS NOTE ADULT - PROBLEM SELECTOR PLAN 5
- c/w simvastatin 10 TIC Followed by Dr. Ronel Alvarado. Noted to have severe behavioral disturbances (paranoid delusions, hallucinations). Per son, recent MRI outpt. showed signs of vascular dementia. AAOx0-1 at baseline. AAOx2 on exam.   Plan:  - c/w sertraline 50 qd  - c/w Seroquel 100 qd

## 2023-11-17 NOTE — PROGRESS NOTE ADULT - PROBLEM SELECTOR PLAN 8
F: s/p 1L NS in ED 3 upRBCs  E: replete as needed  N: Clears  GI: IV PPI BID  DVT: hold iso anemia of unknown etiology  Code:  DNR/DNI, otherwise all measures  Dispo: pending PT

## 2023-11-18 ENCOUNTER — TRANSCRIPTION ENCOUNTER (OUTPATIENT)
Age: 78
End: 2023-11-18

## 2023-11-18 VITALS
TEMPERATURE: 98 F | HEART RATE: 75 BPM | DIASTOLIC BLOOD PRESSURE: 81 MMHG | SYSTOLIC BLOOD PRESSURE: 175 MMHG | OXYGEN SATURATION: 95 % | RESPIRATION RATE: 18 BRPM

## 2023-11-18 DIAGNOSIS — D62 ACUTE POSTHEMORRHAGIC ANEMIA: ICD-10-CM

## 2023-11-18 LAB
ALBUMIN SERPL ELPH-MCNC: 3.2 G/DL — LOW (ref 3.3–5)
ALBUMIN SERPL ELPH-MCNC: 3.2 G/DL — LOW (ref 3.3–5)
ALP SERPL-CCNC: 96 U/L — SIGNIFICANT CHANGE UP (ref 40–120)
ALP SERPL-CCNC: 96 U/L — SIGNIFICANT CHANGE UP (ref 40–120)
ALT FLD-CCNC: 10 U/L — SIGNIFICANT CHANGE UP (ref 10–45)
ALT FLD-CCNC: 10 U/L — SIGNIFICANT CHANGE UP (ref 10–45)
ANION GAP SERPL CALC-SCNC: 12 MMOL/L — SIGNIFICANT CHANGE UP (ref 5–17)
ANION GAP SERPL CALC-SCNC: 12 MMOL/L — SIGNIFICANT CHANGE UP (ref 5–17)
ANION GAP SERPL CALC-SCNC: 13 MMOL/L — SIGNIFICANT CHANGE UP (ref 5–17)
ANION GAP SERPL CALC-SCNC: 13 MMOL/L — SIGNIFICANT CHANGE UP (ref 5–17)
AST SERPL-CCNC: 21 U/L — SIGNIFICANT CHANGE UP (ref 10–40)
AST SERPL-CCNC: 21 U/L — SIGNIFICANT CHANGE UP (ref 10–40)
BILIRUB SERPL-MCNC: 0.6 MG/DL — SIGNIFICANT CHANGE UP (ref 0.2–1.2)
BILIRUB SERPL-MCNC: 0.6 MG/DL — SIGNIFICANT CHANGE UP (ref 0.2–1.2)
BLD GP AB SCN SERPL QL: NEGATIVE — SIGNIFICANT CHANGE UP
BLD GP AB SCN SERPL QL: NEGATIVE — SIGNIFICANT CHANGE UP
BUN SERPL-MCNC: 9 MG/DL — SIGNIFICANT CHANGE UP (ref 7–23)
CALCIUM SERPL-MCNC: 9.1 MG/DL — SIGNIFICANT CHANGE UP (ref 8.4–10.5)
CALCIUM SERPL-MCNC: 9.1 MG/DL — SIGNIFICANT CHANGE UP (ref 8.4–10.5)
CALCIUM SERPL-MCNC: 9.3 MG/DL — SIGNIFICANT CHANGE UP (ref 8.4–10.5)
CALCIUM SERPL-MCNC: 9.3 MG/DL — SIGNIFICANT CHANGE UP (ref 8.4–10.5)
CHLORIDE SERPL-SCNC: 104 MMOL/L — SIGNIFICANT CHANGE UP (ref 96–108)
CHLORIDE SERPL-SCNC: 104 MMOL/L — SIGNIFICANT CHANGE UP (ref 96–108)
CHLORIDE SERPL-SCNC: 105 MMOL/L — SIGNIFICANT CHANGE UP (ref 96–108)
CHLORIDE SERPL-SCNC: 105 MMOL/L — SIGNIFICANT CHANGE UP (ref 96–108)
CO2 SERPL-SCNC: 23 MMOL/L — SIGNIFICANT CHANGE UP (ref 22–31)
CO2 SERPL-SCNC: 23 MMOL/L — SIGNIFICANT CHANGE UP (ref 22–31)
CO2 SERPL-SCNC: 24 MMOL/L — SIGNIFICANT CHANGE UP (ref 22–31)
CO2 SERPL-SCNC: 24 MMOL/L — SIGNIFICANT CHANGE UP (ref 22–31)
CREAT SERPL-MCNC: 0.66 MG/DL — SIGNIFICANT CHANGE UP (ref 0.5–1.3)
CREAT SERPL-MCNC: 0.66 MG/DL — SIGNIFICANT CHANGE UP (ref 0.5–1.3)
CREAT SERPL-MCNC: 0.67 MG/DL — SIGNIFICANT CHANGE UP (ref 0.5–1.3)
CREAT SERPL-MCNC: 0.67 MG/DL — SIGNIFICANT CHANGE UP (ref 0.5–1.3)
EGFR: 89 ML/MIN/1.73M2 — SIGNIFICANT CHANGE UP
FERRITIN SERPL-MCNC: 637 NG/ML — HIGH (ref 13–330)
FERRITIN SERPL-MCNC: 637 NG/ML — HIGH (ref 13–330)
GLUCOSE SERPL-MCNC: 80 MG/DL — SIGNIFICANT CHANGE UP (ref 70–99)
GLUCOSE SERPL-MCNC: 80 MG/DL — SIGNIFICANT CHANGE UP (ref 70–99)
GLUCOSE SERPL-MCNC: 88 MG/DL — SIGNIFICANT CHANGE UP (ref 70–99)
GLUCOSE SERPL-MCNC: 88 MG/DL — SIGNIFICANT CHANGE UP (ref 70–99)
HCT VFR BLD CALC: 27.8 % — LOW (ref 34.5–45)
HCT VFR BLD CALC: 27.8 % — LOW (ref 34.5–45)
HGB BLD-MCNC: 8 G/DL — LOW (ref 11.5–15.5)
HGB BLD-MCNC: 8 G/DL — LOW (ref 11.5–15.5)
IRON SATN MFR SERPL: 257 UG/DL — HIGH (ref 30–160)
IRON SATN MFR SERPL: 257 UG/DL — HIGH (ref 30–160)
IRON SATN MFR SERPL: 52 % — HIGH (ref 14–50)
IRON SATN MFR SERPL: 52 % — HIGH (ref 14–50)
MAGNESIUM SERPL-MCNC: 1.8 MG/DL — SIGNIFICANT CHANGE UP (ref 1.6–2.6)
MAGNESIUM SERPL-MCNC: 1.8 MG/DL — SIGNIFICANT CHANGE UP (ref 1.6–2.6)
MAGNESIUM SERPL-MCNC: 1.9 MG/DL — SIGNIFICANT CHANGE UP (ref 1.6–2.6)
MAGNESIUM SERPL-MCNC: 1.9 MG/DL — SIGNIFICANT CHANGE UP (ref 1.6–2.6)
MCHC RBC-ENTMCNC: 22.7 PG — LOW (ref 27–34)
MCHC RBC-ENTMCNC: 22.7 PG — LOW (ref 27–34)
MCHC RBC-ENTMCNC: 28.8 GM/DL — LOW (ref 32–36)
MCHC RBC-ENTMCNC: 28.8 GM/DL — LOW (ref 32–36)
MCV RBC AUTO: 78.8 FL — LOW (ref 80–100)
MCV RBC AUTO: 78.8 FL — LOW (ref 80–100)
NRBC # BLD: 0 /100 WBCS — SIGNIFICANT CHANGE UP (ref 0–0)
NRBC # BLD: 0 /100 WBCS — SIGNIFICANT CHANGE UP (ref 0–0)
PHOSPHATE SERPL-MCNC: 2.4 MG/DL — LOW (ref 2.5–4.5)
PHOSPHATE SERPL-MCNC: 2.4 MG/DL — LOW (ref 2.5–4.5)
PLATELET # BLD AUTO: 343 K/UL — SIGNIFICANT CHANGE UP (ref 150–400)
PLATELET # BLD AUTO: 343 K/UL — SIGNIFICANT CHANGE UP (ref 150–400)
POTASSIUM SERPL-MCNC: 3 MMOL/L — LOW (ref 3.5–5.3)
POTASSIUM SERPL-MCNC: 3 MMOL/L — LOW (ref 3.5–5.3)
POTASSIUM SERPL-MCNC: 4.2 MMOL/L — SIGNIFICANT CHANGE UP (ref 3.5–5.3)
POTASSIUM SERPL-MCNC: 4.2 MMOL/L — SIGNIFICANT CHANGE UP (ref 3.5–5.3)
POTASSIUM SERPL-SCNC: 3 MMOL/L — LOW (ref 3.5–5.3)
POTASSIUM SERPL-SCNC: 3 MMOL/L — LOW (ref 3.5–5.3)
POTASSIUM SERPL-SCNC: 4.2 MMOL/L — SIGNIFICANT CHANGE UP (ref 3.5–5.3)
POTASSIUM SERPL-SCNC: 4.2 MMOL/L — SIGNIFICANT CHANGE UP (ref 3.5–5.3)
PROT SERPL-MCNC: 6.3 G/DL — SIGNIFICANT CHANGE UP (ref 6–8.3)
PROT SERPL-MCNC: 6.3 G/DL — SIGNIFICANT CHANGE UP (ref 6–8.3)
RBC # BLD: 3.53 M/UL — LOW (ref 3.8–5.2)
RBC # BLD: 3.53 M/UL — LOW (ref 3.8–5.2)
RBC # FLD: 24.3 % — HIGH (ref 10.3–14.5)
RBC # FLD: 24.3 % — HIGH (ref 10.3–14.5)
RH IG SCN BLD-IMP: POSITIVE — SIGNIFICANT CHANGE UP
RH IG SCN BLD-IMP: POSITIVE — SIGNIFICANT CHANGE UP
SODIUM SERPL-SCNC: 140 MMOL/L — SIGNIFICANT CHANGE UP (ref 135–145)
SODIUM SERPL-SCNC: 140 MMOL/L — SIGNIFICANT CHANGE UP (ref 135–145)
SODIUM SERPL-SCNC: 141 MMOL/L — SIGNIFICANT CHANGE UP (ref 135–145)
SODIUM SERPL-SCNC: 141 MMOL/L — SIGNIFICANT CHANGE UP (ref 135–145)
T4 FREE SERPL-MCNC: 1.32 NG/DL — SIGNIFICANT CHANGE UP (ref 0.93–1.7)
T4 FREE SERPL-MCNC: 1.32 NG/DL — SIGNIFICANT CHANGE UP (ref 0.93–1.7)
TIBC SERPL-MCNC: 494 UG/DL — HIGH (ref 220–430)
TIBC SERPL-MCNC: 494 UG/DL — HIGH (ref 220–430)
TRANSFERRIN SERPL-MCNC: 301 MG/DL — SIGNIFICANT CHANGE UP (ref 200–360)
TRANSFERRIN SERPL-MCNC: 301 MG/DL — SIGNIFICANT CHANGE UP (ref 200–360)
UIBC SERPL-MCNC: 237 UG/DL — SIGNIFICANT CHANGE UP (ref 110–370)
UIBC SERPL-MCNC: 237 UG/DL — SIGNIFICANT CHANGE UP (ref 110–370)
WBC # BLD: 10.03 K/UL — SIGNIFICANT CHANGE UP (ref 3.8–10.5)
WBC # BLD: 10.03 K/UL — SIGNIFICANT CHANGE UP (ref 3.8–10.5)
WBC # FLD AUTO: 10.03 K/UL — SIGNIFICANT CHANGE UP (ref 3.8–10.5)
WBC # FLD AUTO: 10.03 K/UL — SIGNIFICANT CHANGE UP (ref 3.8–10.5)

## 2023-11-18 PROCEDURE — 92610 EVALUATE SWALLOWING FUNCTION: CPT

## 2023-11-18 PROCEDURE — 85027 COMPLETE CBC AUTOMATED: CPT

## 2023-11-18 PROCEDURE — 97161 PT EVAL LOW COMPLEX 20 MIN: CPT

## 2023-11-18 PROCEDURE — 83615 LACTATE (LD) (LDH) ENZYME: CPT

## 2023-11-18 PROCEDURE — 85730 THROMBOPLASTIN TIME PARTIAL: CPT

## 2023-11-18 PROCEDURE — 96374 THER/PROPH/DIAG INJ IV PUSH: CPT

## 2023-11-18 PROCEDURE — 85045 AUTOMATED RETICULOCYTE COUNT: CPT

## 2023-11-18 PROCEDURE — 94640 AIRWAY INHALATION TREATMENT: CPT

## 2023-11-18 PROCEDURE — 86901 BLOOD TYPING SEROLOGIC RH(D): CPT

## 2023-11-18 PROCEDURE — 82272 OCCULT BLD FECES 1-3 TESTS: CPT

## 2023-11-18 PROCEDURE — 83010 ASSAY OF HAPTOGLOBIN QUANT: CPT

## 2023-11-18 PROCEDURE — 96375 TX/PRO/DX INJ NEW DRUG ADDON: CPT

## 2023-11-18 PROCEDURE — 70450 CT HEAD/BRAIN W/O DYE: CPT | Mod: MA

## 2023-11-18 PROCEDURE — 82803 BLOOD GASES ANY COMBINATION: CPT

## 2023-11-18 PROCEDURE — 84439 ASSAY OF FREE THYROXINE: CPT

## 2023-11-18 PROCEDURE — 82728 ASSAY OF FERRITIN: CPT

## 2023-11-18 PROCEDURE — 83540 ASSAY OF IRON: CPT

## 2023-11-18 PROCEDURE — 36430 TRANSFUSION BLD/BLD COMPNT: CPT

## 2023-11-18 PROCEDURE — 84443 ASSAY THYROID STIM HORMONE: CPT

## 2023-11-18 PROCEDURE — 84132 ASSAY OF SERUM POTASSIUM: CPT

## 2023-11-18 PROCEDURE — 84466 ASSAY OF TRANSFERRIN: CPT

## 2023-11-18 PROCEDURE — 99291 CRITICAL CARE FIRST HOUR: CPT | Mod: 25

## 2023-11-18 PROCEDURE — 86850 RBC ANTIBODY SCREEN: CPT

## 2023-11-18 PROCEDURE — 82248 BILIRUBIN DIRECT: CPT

## 2023-11-18 PROCEDURE — 93306 TTE W/DOPPLER COMPLETE: CPT

## 2023-11-18 PROCEDURE — 84295 ASSAY OF SERUM SODIUM: CPT

## 2023-11-18 PROCEDURE — 82746 ASSAY OF FOLIC ACID SERUM: CPT

## 2023-11-18 PROCEDURE — 85025 COMPLETE CBC W/AUTO DIFF WBC: CPT

## 2023-11-18 PROCEDURE — 82247 BILIRUBIN TOTAL: CPT

## 2023-11-18 PROCEDURE — 80048 BASIC METABOLIC PNL TOTAL CA: CPT

## 2023-11-18 PROCEDURE — 36415 COLL VENOUS BLD VENIPUNCTURE: CPT

## 2023-11-18 PROCEDURE — 86900 BLOOD TYPING SEROLOGIC ABO: CPT

## 2023-11-18 PROCEDURE — 71045 X-RAY EXAM CHEST 1 VIEW: CPT

## 2023-11-18 PROCEDURE — 80053 COMPREHEN METABOLIC PANEL: CPT

## 2023-11-18 PROCEDURE — 82330 ASSAY OF CALCIUM: CPT

## 2023-11-18 PROCEDURE — 85610 PROTHROMBIN TIME: CPT

## 2023-11-18 PROCEDURE — 83735 ASSAY OF MAGNESIUM: CPT

## 2023-11-18 PROCEDURE — 99239 HOSP IP/OBS DSCHRG MGMT >30: CPT

## 2023-11-18 PROCEDURE — 88305 TISSUE EXAM BY PATHOLOGIST: CPT

## 2023-11-18 PROCEDURE — 83550 IRON BINDING TEST: CPT

## 2023-11-18 PROCEDURE — 84484 ASSAY OF TROPONIN QUANT: CPT

## 2023-11-18 PROCEDURE — 82962 GLUCOSE BLOOD TEST: CPT

## 2023-11-18 PROCEDURE — 82607 VITAMIN B-12: CPT

## 2023-11-18 PROCEDURE — 82140 ASSAY OF AMMONIA: CPT

## 2023-11-18 PROCEDURE — 80307 DRUG TEST PRSMV CHEM ANLYZR: CPT

## 2023-11-18 PROCEDURE — 84100 ASSAY OF PHOSPHORUS: CPT

## 2023-11-18 PROCEDURE — 93005 ELECTROCARDIOGRAM TRACING: CPT

## 2023-11-18 PROCEDURE — 86923 COMPATIBILITY TEST ELECTRIC: CPT

## 2023-11-18 PROCEDURE — 83605 ASSAY OF LACTIC ACID: CPT

## 2023-11-18 PROCEDURE — P9016: CPT

## 2023-11-18 PROCEDURE — 87799 DETECT AGENT NOS DNA QUANT: CPT

## 2023-11-18 RX ORDER — FUROSEMIDE 40 MG
1 TABLET ORAL
Qty: 0 | Refills: 0 | DISCHARGE

## 2023-11-18 RX ORDER — POTASSIUM CHLORIDE 20 MEQ
40 PACKET (EA) ORAL ONCE
Refills: 0 | Status: COMPLETED | OUTPATIENT
Start: 2023-11-18 | End: 2023-11-18

## 2023-11-18 RX ORDER — PANTOPRAZOLE SODIUM 20 MG/1
1 TABLET, DELAYED RELEASE ORAL
Qty: 0 | Refills: 0 | DISCHARGE

## 2023-11-18 RX ORDER — PANTOPRAZOLE SODIUM 20 MG/1
1 TABLET, DELAYED RELEASE ORAL
Qty: 60 | Refills: 0
Start: 2023-11-18 | End: 2023-12-17

## 2023-11-18 RX ORDER — POTASSIUM CHLORIDE 20 MEQ
10 PACKET (EA) ORAL
Refills: 0 | Status: COMPLETED | OUTPATIENT
Start: 2023-11-18 | End: 2023-11-18

## 2023-11-18 RX ORDER — FERROUS SULFATE 325(65) MG
1 TABLET ORAL
Qty: 15 | Refills: 0
Start: 2023-11-18 | End: 2023-12-17

## 2023-11-18 RX ORDER — GABAPENTIN 400 MG/1
300 CAPSULE ORAL EVERY 12 HOURS
Refills: 0 | Status: DISCONTINUED | OUTPATIENT
Start: 2023-11-18 | End: 2023-11-18

## 2023-11-18 RX ORDER — IRON SUCROSE 20 MG/ML
300 INJECTION, SOLUTION INTRAVENOUS EVERY 24 HOURS
Refills: 0 | Status: DISCONTINUED | OUTPATIENT
Start: 2023-11-18 | End: 2023-11-18

## 2023-11-18 RX ORDER — POTASSIUM CHLORIDE 20 MEQ
40 PACKET (EA) ORAL ONCE
Refills: 0 | Status: DISCONTINUED | OUTPATIENT
Start: 2023-11-18 | End: 2023-11-18

## 2023-11-18 RX ORDER — ALBUTEROL 90 UG/1
2 AEROSOL, METERED ORAL
Qty: 1 | Refills: 0
Start: 2023-11-18 | End: 2023-11-27

## 2023-11-18 RX ORDER — QUETIAPINE FUMARATE 200 MG/1
1 TABLET, FILM COATED ORAL
Qty: 0 | Refills: 0 | DISCHARGE
Start: 2023-11-18

## 2023-11-18 RX ORDER — ASPIRIN/CALCIUM CARB/MAGNESIUM 324 MG
1 TABLET ORAL
Qty: 0 | Refills: 0 | DISCHARGE
Start: 2023-11-18

## 2023-11-18 RX ORDER — MAGNESIUM OXIDE 400 MG ORAL TABLET 241.3 MG
400 TABLET ORAL EVERY 12 HOURS
Refills: 0 | Status: DISCONTINUED | OUTPATIENT
Start: 2023-11-18 | End: 2023-11-18

## 2023-11-18 RX ADMIN — Medication 100 MILLIEQUIVALENT(S): at 11:46

## 2023-11-18 RX ADMIN — Medication 105 MILLIGRAM(S): at 14:12

## 2023-11-18 RX ADMIN — IRON SUCROSE 176.67 MILLIGRAM(S): 20 INJECTION, SOLUTION INTRAVENOUS at 06:11

## 2023-11-18 RX ADMIN — Medication 650 MILLIGRAM(S): at 06:11

## 2023-11-18 RX ADMIN — Medication 650 MILLIGRAM(S): at 12:25

## 2023-11-18 RX ADMIN — Medication 650 MILLIGRAM(S): at 00:53

## 2023-11-18 RX ADMIN — Medication 100 MILLIEQUIVALENT(S): at 10:21

## 2023-11-18 RX ADMIN — LOSARTAN POTASSIUM 100 MILLIGRAM(S): 100 TABLET, FILM COATED ORAL at 06:11

## 2023-11-18 RX ADMIN — SERTRALINE 100 MILLIGRAM(S): 25 TABLET, FILM COATED ORAL at 11:46

## 2023-11-18 RX ADMIN — Medication 1 TABLET(S): at 11:46

## 2023-11-18 RX ADMIN — Medication 650 MILLIGRAM(S): at 11:47

## 2023-11-18 RX ADMIN — GABAPENTIN 300 MILLIGRAM(S): 400 CAPSULE ORAL at 11:47

## 2023-11-18 RX ADMIN — POLYETHYLENE GLYCOL 3350 17 GRAM(S): 17 POWDER, FOR SOLUTION ORAL at 11:46

## 2023-11-18 RX ADMIN — Medication 81 MILLIGRAM(S): at 11:46

## 2023-11-18 RX ADMIN — MAGNESIUM OXIDE 400 MG ORAL TABLET 400 MILLIGRAM(S): 241.3 TABLET ORAL at 10:21

## 2023-11-18 RX ADMIN — LIDOCAINE 1 PATCH: 4 CREAM TOPICAL at 10:21

## 2023-11-18 RX ADMIN — PANTOPRAZOLE SODIUM 40 MILLIGRAM(S): 20 TABLET, DELAYED RELEASE ORAL at 11:47

## 2023-11-18 RX ADMIN — Medication 100 MILLIEQUIVALENT(S): at 13:07

## 2023-11-18 RX ADMIN — ATENOLOL 25 MILLIGRAM(S): 25 TABLET ORAL at 06:12

## 2023-11-18 NOTE — DISCHARGE NOTE PROVIDER - NSDCMRMEDTOKEN_GEN_ALL_CORE_FT
aspirin 81 mg oral delayed release tablet: 1 tab(s) orally 2 times a day for 30 days  atenolol 25 mg oral tablet: 1 tab(s) orally once a day (at bedtime)  gabapentin 300 mg oral capsule: 1 cap(s) orally 2 times a day  losartan 100 mg oral tablet: 1 tab(s) orally once a day  pantoprazole 40 mg oral delayed release tablet: 1 tab(s) orally once a day  sertraline 100 mg oral tablet: 1 tab(s) orally once a day  simvastatin 10 mg oral tablet: 1 tab(s) orally once a day (at bedtime)  Vitamin D3 25 mcg (1000 intl units) oral tablet: 1 tab(s) orally 2 times a day   aspirin 81 mg oral delayed release tablet: 1 tab(s) orally 2 times a day for 30 days  atenolol 25 mg oral tablet: 1 tab(s) orally once a day (at bedtime)  ferrous sulfate 325 mg (65 mg elemental iron) oral tablet: 1 tab(s) orally every 48 hours Please take one tablet every other day with meals  furosemide 20 mg oral tablet: 1 tab(s) orally once a day  gabapentin 300 mg oral capsule: 1 cap(s) orally 2 times a day  losartan 100 mg oral tablet: 1 tab(s) orally once a day  pantoprazole 40 mg oral delayed release tablet: 1 tab(s) orally every 12 hours Please take one tablet every 12 hours for 2 weeks. After 2 weeks, please take one tablet a day  QUEtiapine 100 mg oral tablet: 1 tab(s) orally once a day (at bedtime)  sertraline 100 mg oral tablet: 1 tab(s) orally once a day  simvastatin 10 mg oral tablet: 1 tab(s) orally once a day (at bedtime)  Vitamin D3 25 mcg (1000 intl units) oral tablet: 1 tab(s) orally 2 times a day   Albuterol (Eqv-ProAir HFA) 90 mcg/inh inhalation aerosol: 2 puff(s) inhaled once a day (at bedtime) as needed for  shortness of breath and/or wheezing Please use as needed for shortness of breath or wheezing  aspirin 81 mg oral tablet, chewable: 1 tab(s) orally once a day  atenolol 25 mg oral tablet: 1 tab(s) orally once a day (at bedtime)  ferrous sulfate 325 mg (65 mg elemental iron) oral tablet: 1 tab(s) orally every 48 hours Please take one tablet every other day with meals  furosemide 20 mg oral tablet: 1 tab(s) orally once a day  gabapentin 300 mg oral capsule: 1 cap(s) orally 2 times a day  losartan 100 mg oral tablet: 1 tab(s) orally once a day  pantoprazole 40 mg oral delayed release tablet: 1 tab(s) orally every 12 hours Please take one tablet every 12 hours for 2 weeks. After 2 weeks, please take one tablet a day  QUEtiapine 100 mg oral tablet: 1 tab(s) orally once a day (at bedtime)  sertraline 100 mg oral tablet: 1 tab(s) orally once a day  simvastatin 10 mg oral tablet: 1 tab(s) orally once a day (at bedtime)  Vitamin D3 25 mcg (1000 intl units) oral tablet: 1 tab(s) orally 2 times a day

## 2023-11-18 NOTE — DISCHARGE NOTE PROVIDER - HOSPITAL COURSE
78F PMHx vascular dementia, HTN, HLD, OA s/p b/l knee, hip replacements presenting to the hospital due to outpatient labs showing Hgb of 4.5. The patient is unable to provide history due to dementia, therefore the patient's son is the main source of history. The patient has been feeling SOB, weakness and worsening demential for the past 2-3 weeks. No recent dark stools or bright blood noted in stools. On arrival, Hgb was 4.4 for which the patient received 2 units of pRBC. The patient was noted with crackles on exam in AM and was given Lasix for Flash Pulmonary edema. Repeat Hgb showed proper improvement (6.6). The patient was found with hypoxia on arrival and was placed on HFNC which was weaned to 6L NC in AM. The patient was also noted with Hypertension for which she was given Hydralazine in the ED, losartan in the AM and is also s/p Lasix. Her hypoxia was due to pulmonary edema 2/2/ volume from repeat transfusions. She is stable on room air s/p lasix 20 mg IVx3. She required one more pRBC before her EGD on 11/16. EGD: gastritis (biopsy) angioectasias (thermotherapy) in antrum and duodenum. Pt now with stable Hgb 8 and with no oxygen requirement, stable for discharge home with [facility].     # Acute blood loss anemia  # Iron deficiency   s/p EGD, thermal therapy for duodenal angioectasias   -Iron sucrose 300mg IV qd---received 1 dose  -ferrous sulfate 324mg every other day on discharge .  miralax if constipated - recs discussed with son     # Gastritis   Advised family to avoid NSAIDs, meloxicam, naproxen (was taking meloxicam, naproxen for back pain)   Trial tylenol, staying below upper daily limit   EGD, biopsy pathology results discussed with GI fellow. No Polyps seen on EGD. no further workup recommended.   -pantoprazole BID x 2 more weeks, followed by pantoprazole qd thereafter    # HTN   - on home losartan 100mg qd   -home atenolol 25mg qHS resumed on 11.17.     # Vascular dementia   Continue workup outpatient   Aspirin resumed post endoscopy     # Acute pulmonary edema.   RESOLVED  Patient with acute pulmnmary edema requiring O2 likely 2/2 repeat transfusions. TTE with LVH, likey indicating reduced compliance and preload tolerance.  s/p Lasix 20 mg IV x2. Blines bilaterally on POCUS 11/15. given 1 more lasix 20. Now euvolemic.    # Chronic back pain  h/o b/ knee and L shoulder joint replacement. Noted to have difficulty w/ ambulation over last 2 weeks. Typically ambulates w/ walker short distance w/ PT.   Plan:  - c/w gabapentin 300 BID  - PT consult rec NATAN.      Patient was discharged to: home [facility]    New medications: protonix BID for 2 weeks and then once a week and ferrous sulfate every other day  Changes to old medications: none  Medications that were stopped: none    Items to follow up as outpatient: Please follow up with PCP for resolution of symptoms    Physical exam at the time of discharge:  General: NAD; Wallisian speaking, slurred speech that is not new, able to answer yes and no with use of   HEENT: EOMI  Cardiac: RRR; +S1/S2, no murmurs  Pulm: CTAB no w/r/r  GI: soft, NT/ND, +BS  Extremities: WWP; no edema, clubbing or cyanosis  Vasc: 2+ radial, DP pulses B/L  Neuro: AAOx2; no focal deficits             78F PMHx vascular dementia, HTN, HLD, OA s/p b/l knee, hip replacements presenting to the hospital due to outpatient labs showing Hgb of 4.5. The patient is unable to provide history due to dementia, therefore the patient's son is the main source of history. The patient has been feeling SOB, weakness and worsening demential for the past 2-3 weeks. No recent dark stools or bright blood noted in stools. On arrival, Hgb was 4.4 for which the patient received 2 units of pRBC. The patient was noted with crackles on exam in AM and was given Lasix for Flash Pulmonary edema. Repeat Hgb showed proper improvement (6.6). The patient was found with hypoxia on arrival and was placed on HFNC which was weaned to 6L NC in AM. The patient was also noted with Hypertension for which she was given Hydralazine in the ED, losartan in the AM and is also s/p Lasix. Her hypoxia was due to pulmonary edema 2/2/ volume from repeat transfusions. She is stable on room air s/p lasix 20 mg IVx3. She required one more pRBC before her EGD on 11/16. EGD: gastritis (biopsy) angioectasias (thermotherapy) in antrum and duodenum. Pt now with stable Hgb 8 and with no oxygen requirement, stable for discharge home with A.     # Acute blood loss anemia  # Iron deficiency   s/p EGD, thermal therapy for duodenal angioectasias   -Iron sucrose 300mg IV qd---received 1 dose  -ferrous sulfate 324mg every other day on discharge .  miralax if constipated - recs discussed with son     # Gastritis   Advised family to avoid NSAIDs, meloxicam, naproxen (was taking meloxicam, naproxen for back pain)   Trial tylenol, staying below upper daily limit   EGD, biopsy pathology results discussed with GI fellow. No Polyps seen on EGD. no further workup recommended.   -pantoprazole BID x 2 more weeks, followed by pantoprazole qd thereafter    # HTN   - on home losartan 100mg qd   -home atenolol 25mg qHS resumed on 11.17.   - resume home furosemide 20mg qd on discharge    # Vascular dementia   Continue workup outpatient   Aspirin resumed post endoscopy     # Acute pulmonary edema.   RESOLVED  Patient with acute pulmnmary edema requiring O2 likely 2/2 repeat transfusions. TTE with LVH, likey indicating reduced compliance and preload tolerance.  s/p Lasix 20 mg IV x2. Blines bilaterally on POCUS 11/15. given 1 more lasix 20. Now euvolemic.    # Chronic back pain  h/o b/ knee and L shoulder joint replacement. Noted to have difficulty w/ ambulation over last 2 weeks. Typically ambulates w/ walker short distance w/ PT.   Plan:  - c/w gabapentin 300 BID  - PT consult rec NATAN.      Patient was discharged to: home [facility]    New medications: protonix BID for 2 weeks and then once a week and ferrous sulfate every other day  Changes to old medications: none  Medications that were stopped: none    Items to follow up as outpatient: Please follow up with PCP for resolution of symptoms    Physical exam at the time of discharge:  General: NAD; Slovak speaking, slurred speech that is not new, able to answer yes and no with use of   HEENT: EOMI  Cardiac: RRR; +S1/S2, no murmurs  Pulm: CTAB no w/r/r  GI: soft, NT/ND, +BS  Extremities: WWP; no edema, clubbing or cyanosis  Vasc: 2+ radial, DP pulses B/L  Neuro: AAOx2; no focal deficits

## 2023-11-18 NOTE — DISCHARGE NOTE PROVIDER - NSDCCPCAREPLAN_GEN_ALL_CORE_FT
PRINCIPAL DISCHARGE DIAGNOSIS  Diagnosis: Symptomatic anemia  Assessment and Plan of Treatment: Anemia is a condition in which there are not enough red blood cells or hemoglobin in the blood. Hemoglobin is a substance in red blood cells that carries oxygen.  When you do not have enough red blood cells or hemoglobin (are anemic), your body cannot get enough oxygen, and your organs may not work properly. As a result, you may feel very tired or have other problems.  Common causes of anemia include:  - Excessive bleeding. Anemia can be caused by excessive bleeding inside or outside the body, including bleeding from the intestines or from heavy menstrual periods in females.  - Poor nutrition.  - Long-lasting (chronic) kidney, thyroid, and liver disease.  The signs or symptome include weakness, dizziness, headache, or shortness of breath. You presented to the ED with low hemoglobin. We gave you two units of blood. Because we gave you fluid, it consolidated in your lungs making it difficult for you to breathe. Now your hemoblogin is stable and your breathing has improved.   Please continue to follow up with your PCP and take iron tablets every other day. Please also take protonix 40mg twice a day for 2 weeks and then once a day.      SECONDARY DISCHARGE DIAGNOSES  Diagnosis: Acute respiratory failure with hypoxia  Assessment and Plan of Treatment:      PRINCIPAL DISCHARGE DIAGNOSIS  Diagnosis: Symptomatic anemia  Assessment and Plan of Treatment: Anemia is a condition in which there are not enough red blood cells or hemoglobin in the blood. Hemoglobin is a substance in red blood cells that carries oxygen.  When you do not have enough red blood cells or hemoglobin (are anemic), your body cannot get enough oxygen, and your organs may not work properly. As a result, you may feel very tired or have other problems.  Common causes of anemia include:  - Excessive bleeding. Anemia can be caused by excessive bleeding inside or outside the body, including bleeding from the intestines or from heavy menstrual periods in females.  - Poor nutrition.  - Long-lasting (chronic) kidney, thyroid, and liver disease.  The signs or symptome include weakness, dizziness, headache, or shortness of breath. You presented to the ED with low hemoglobin. We gave you two units of blood. Because we gave you fluid, it consolidated in your lungs making it difficult for you to breathe. Now your hemoblogin is stable and your breathing has improved.   Please continue to follow up with your PCP and take iron tablets every other day. Please also take protonix 40mg twice a day for 2 weeks and then once a day.

## 2023-11-18 NOTE — PROGRESS NOTE ADULT - SUBJECTIVE AND OBJECTIVE BOX
OVERNIGHT EVENTS/INTERVAL HPI: o/n gurjit. Used . This morning, pt reports back pain but otherwise denies any chest pain, headache, nausea, or vomiting.     REVIEW OF SYSTEMS:  All other review of systems is negative unless indicated above.    OBJECTIVE:  T(C): 36.4 (11-18-23 @ 08:36), Max: 36.9 (11-17-23 @ 14:09)  HR: 71 (11-18-23 @ 08:36) (71 - 88)  BP: 156/82 (11-18-23 @ 08:36) (153/83 - 189/79)  RR: 18 (11-18-23 @ 08:36) (16 - 18)  SpO2: 92% (11-18-23 @ 08:36) (92% - 96%)  Daily     Daily     Physical Exam:  General: in no acute distress  Eyes: EOMI intact bilaterally.   HENT: Moist mucous membranes  Lungs: CTA B/L. No wheezes, rales, or rhonchi  Cardiovascular: RRR. No murmurs, rubs, or gallops  Abdomen: Soft, non-tender non-distended; No rebound or guarding  Extremities: WWP, No clubbing, cyanosis or edema  MSK: 4/5  strength b/l  Neurological: unable to assess as pt Yoruba speaking and  unable to understand  Skin: Warm and dry. No obvious rash     Medications:  MEDICATIONS  (STANDING):  acetaminophen     Tablet .. 650 milliGRAM(s) Oral every 6 hours  aspirin  chewable 81 milliGRAM(s) Oral daily  atenolol  Tablet 25 milliGRAM(s) Oral daily  ferrous    sulfate 325 milliGRAM(s) Oral <User Schedule>  gabapentin 300 milliGRAM(s) Oral daily  lidocaine   4% Patch 1 Patch Transdermal every 24 hours  losartan 100 milliGRAM(s) Oral daily  magnesium oxide 400 milliGRAM(s) Oral every 12 hours  multivitamin 1 Tablet(s) Oral daily  pantoprazole  Injectable 40 milliGRAM(s) IV Push every 12 hours  polyethylene glycol 3350 17 Gram(s) Oral daily  QUEtiapine 100 milliGRAM(s) Oral at bedtime  senna 2 Tablet(s) Oral at bedtime  sertraline 100 milliGRAM(s) Oral daily  simvastatin 10 milliGRAM(s) Oral at bedtime  thiamine IVPB 500 milliGRAM(s) IV Intermittent daily    MEDICATIONS  (PRN):      Labs:                        8.0    10.03 )-----------( 343      ( 18 Nov 2023 05:30 )             27.8     11-18    141  |  104  |  9   ----------------------------<  88  3.0<L>   |  24  |  0.66    Ca    9.1      18 Nov 2023 05:30  Phos  2.4     11-18  Mg     1.8     11-18    TPro  6.3  /  Alb  3.2<L>  /  TBili  0.6  /  DBili  x   /  AST  21  /  ALT  10  /  AlkPhos  96  11-18      Urinalysis Basic - ( 18 Nov 2023 05:30 )    Color: x / Appearance: x / SG: x / pH: x  Gluc: 88 mg/dL / Ketone: x  / Bili: x / Urobili: x   Blood: x / Protein: x / Nitrite: x   Leuk Esterase: x / RBC: x / WBC x   Sq Epi: x / Non Sq Epi: x / Bacteria: x          Radiology: Reviewed

## 2023-11-18 NOTE — PROGRESS NOTE ADULT - PROBLEM SELECTOR PLAN 9
#chronic back pain  h/o b/ knee and L shoulder joint replacement. Noted to have difficulty w/ ambulation over last 2 weeks. Typically ambulates w/ walker short distance w/ PT.   Plan:  - tylenol 650 q6 PRN  - c/w gabapentin 300 BID  - PT consult rec NATAN

## 2023-11-18 NOTE — DISCHARGE NOTE PROVIDER - ATTENDING DISCHARGE PHYSICAL EXAMINATION:
78 YOF with PMH of vascular dementia (AAO to self), HTN, HLD, OA s/p BL knee and hip replacements admitted for acute blood loss anemia. Underwent EGD on 11/16 which showed gastritis (s/p biopsy), angioectasias in antrum and duodenum (s/p thermotherapy). Hospital course complicated by pulmonary edema (thought to be 2/2 volume from pRBCs, improved after IV diuresis)      Acute blood loss anemia, MARINA - s/p IV iron, discharge with ferrous sulfate 324mg q48hr with bowel regimen. Hgb stable.    Gastritis - avoid NSAID, PPI x2w then daily     Hypokalemia - repleted    HTN - cont atenolol. Regarding furosemide - resume on discharge as patient supposed to be taking (confirmed with son).   ?Discharge: PT rec NATAN but son wishes to take home with HPT and HHA, plan for DC today

## 2023-11-18 NOTE — PROGRESS NOTE ADULT - PROBLEM SELECTOR PLAN 3
H/o HTN, takes losartan 100 qd. Found to have /66 in ED that increased to 211/105, likely 2/2 pain. Received hydral 10 and toradol 15; BP improved to 140's. Now s/p home dose losartan and Lasix for Flush Pulmonary Edema   Plan:  - c/w home losartan 100mg  - pain control  - resume atenolol  (home med) 25mg qhs  - per surescripts - was on furosemide earlier in the year. (also on outpatient cardiology med list in Nov 2023) Per son, he stopped furosemide 5 months ago bc pt was using bathroom too frequently.

## 2023-11-18 NOTE — PROGRESS NOTE ADULT - ATTENDING COMMENTS
78 YOF with PMH of vascular dementia (AAO to self), HTN, HLD, OA s/p BL knee and hip replacements admitted for acute blood loss anemia. Underwent EGD on 11/16 which showed gastritis (s/p biopsy), angioectasias in antrum and duodenum (s/p thermotherapy). Hospital course complicated by pulmonary edema (thought to be 2/2 volume from pRBCs, improved after IV diuresis)      Acute blood loss anemia, MARINA - s/p IV iron, discharge with ferrous sulfate 324mg q48hr with bowel regimen    Gastritis - avoid NSAID, PPI x2w then daily     Hypokalemia - repleted    HTN - cont atenolol. Regarding furosemide - resume on discharge as patient supposed to be taking (confirmed with son).   ?Discharge: PT rec NATAN but son wishes to take home with HPT and HHA, plan for DC today

## 2023-11-18 NOTE — DISCHARGE NOTE NURSING/CASE MANAGEMENT/SOCIAL WORK - NSDCPEFALRISK_GEN_ALL_CORE
For information on Fall & Injury Prevention, visit: [email or web address] OR  [email or web address] OR  https://www.cdc.gov/steadi/patient.html

## 2023-11-18 NOTE — PROGRESS NOTE ADULT - PROBLEM SELECTOR PLAN 4
RESOLVED  Patient with acute pulmnmary edema requiring O2 likely 2/2 repeat transfusions. TTE with LVH, likey indicating reduced compliance and preload tolerance.  s/p Lasix 20 mg IV x2. Blines bilaterally on POCUS 11/15. given 1 more lasix 20. Now euvolemic.

## 2023-11-18 NOTE — PROGRESS NOTE ADULT - PROBLEM SELECTOR PLAN 1
Last colonoscopy in 2018 with recommendation to return in 5 years (this year).   EGD 11/16 with  gastritis (biopsy) and 1 antral and 1 duodenal angioectasia (thermal therapy)  patient's son states that she takes Naproxen and Meloxicam for back pain every other day.   Hgb 4.5 on admission (Microcytic as MCV was 70), s/p 2 units of pRBCs with increase of Hgb to 6.6. --> 6.9  Hgb is now stable above 8.     Plan:  - maintain active T&S, 2 large bore IV  - favor continuing aspirin if tolerated, patient has hypodensities, chronic microvascular disease on CT head).   - per GI, OK to resume aspirin after EGD   - aspirin resumed 11.17  - for gastritis: pantoprazole PO BID x 2 more weeks, followed by pantoprazole qd thereafter (needs new script)

## 2023-11-18 NOTE — PROGRESS NOTE ADULT - PROBLEM SELECTOR PLAN 2
Iron panel significant iron deficiency anemia.   -last prbc on 11/15      [ ] Iron sucrose 300mg IV (received 1 dose)   [ ] ferrous sulfate 324mg every other day on discharge .  miralax if constipated - recs discussed with son (family requesting prescription for iron tabs)  [ ] recheck iron levels outpatient

## 2023-11-18 NOTE — DISCHARGE NOTE NURSING/CASE MANAGEMENT/SOCIAL WORK - PATIENT PORTAL LINK FT
You can access the FollowMyHealth Patient Portal offered by [facility] by registering at the following website: [email or web address]. By joining [facility]s FollowMyHealth portal, you will also be able to view your health information using other applications (apps) compatible with our system.

## 2023-11-18 NOTE — PROGRESS NOTE ADULT - ASSESSMENT
78F PMHx vascular dementia, HTN, HLD, COPD, OA, presenting with symptomatic anemia (SOB, fatigued, pale, dizzy) x 2 weeks, was seen by PCP and advised to come to ED after labs showed Hgb 4.6, being admitted to [facility] for management of anemia and medical workup. 
78F PMHx vascular dementia, HTN, HLD, COPD, OA, presenting with symptomatic anemia (SOB, fatigued, pale, dizzy) x 2 weeks, was seen by PCP and advised to come to ED after labs showed Hgb 4.6, being admitted to [facility] for management of anemia and medical workup. 
78F PMHx vascular dementia, HTN, HLD, COPD, OA, presenting with symptomatic anemia (SOB, fatigued, pale, dizzy) x 2 weeks, was seen by PCP and advised to come to ED after labs showed Hgb 4.6, being admitted to [facility] for management of anemia and medical workup. 
78F PMHx vascular dementia, HTN, HLD, COPD, OA, presenting with symptomatic anemia (SOB, fatigued, pale, dizzy) x 2 weeks, was seen by PCP and advised to come to ED after labs showed Hgb 4.6, being admitted to [facility] for management of anemia and medical workup. Now hgb stable and stepped down to RMF
78F PMHx vascular dementia, HTN, HLD, COPD, OA, presenting with symptomatic anemia (SOB, fatigued, pale, dizzy) x 2 weeks, was seen by PCP and advised to come to ED after labs showed Hgb 4.6, being admitted to [facility] for management of anemia and medical workup. 
78F PMHx vascular dementia, HTN, HLD, COPD, OA, presenting with symptomatic anemia (SOB, fatigued, pale, dizzy) x 2 weeks, was seen by PCP and advised to come to ED after labs showed Hgb 4.6, being admitted to [facility] for management of anemia and medical workup.

## 2023-11-18 NOTE — PROGRESS NOTE ADULT - PROBLEM SELECTOR PLAN 10
F: s/p 1L NS in ED 3 upRBCs  E: replete as needed  N: minced and moist   GI: IV PPI BID  DVT: Aspirin   Code:  DNR/DNI, otherwise all measures  Dispo: home
F: s/p 1L NS in ED 3 upRBCs  E: replete as needed  N: minced and moist   GI: IV PPI BID  DVT: Aspirin   Code:  DNR/DNI, otherwise all measures  Dispo: NATAN

## 2023-11-22 DIAGNOSIS — F01.52: ICD-10-CM

## 2023-11-22 DIAGNOSIS — R13.10 DYSPHAGIA, UNSPECIFIED: ICD-10-CM

## 2023-11-22 DIAGNOSIS — Z79.82 LONG TERM (CURRENT) USE OF ASPIRIN: ICD-10-CM

## 2023-11-22 DIAGNOSIS — D62 ACUTE POSTHEMORRHAGIC ANEMIA: ICD-10-CM

## 2023-11-22 DIAGNOSIS — E83.39 OTHER DISORDERS OF PHOSPHORUS METABOLISM: ICD-10-CM

## 2023-11-22 DIAGNOSIS — K29.70 GASTRITIS, UNSPECIFIED, WITHOUT BLEEDING: ICD-10-CM

## 2023-11-22 DIAGNOSIS — F10.11 ALCOHOL ABUSE, IN REMISSION: ICD-10-CM

## 2023-11-22 DIAGNOSIS — E78.5 HYPERLIPIDEMIA, UNSPECIFIED: ICD-10-CM

## 2023-11-22 DIAGNOSIS — D50.9 IRON DEFICIENCY ANEMIA, UNSPECIFIED: ICD-10-CM

## 2023-11-22 DIAGNOSIS — M54.9 DORSALGIA, UNSPECIFIED: ICD-10-CM

## 2023-11-22 DIAGNOSIS — K31.811 ANGIODYSPLASIA OF STOMACH AND DUODENUM WITH BLEEDING: ICD-10-CM

## 2023-11-22 DIAGNOSIS — J44.9 CHRONIC OBSTRUCTIVE PULMONARY DISEASE, UNSPECIFIED: ICD-10-CM

## 2023-11-22 DIAGNOSIS — I35.1 NONRHEUMATIC AORTIC (VALVE) INSUFFICIENCY: ICD-10-CM

## 2023-11-22 DIAGNOSIS — Z96.653 PRESENCE OF ARTIFICIAL KNEE JOINT, BILATERAL: ICD-10-CM

## 2023-11-22 DIAGNOSIS — J81.0 ACUTE PULMONARY EDEMA: ICD-10-CM

## 2023-11-22 DIAGNOSIS — K92.2 GASTROINTESTINAL HEMORRHAGE, UNSPECIFIED: ICD-10-CM

## 2023-11-22 DIAGNOSIS — Z87.891 PERSONAL HISTORY OF NICOTINE DEPENDENCE: ICD-10-CM

## 2023-11-22 DIAGNOSIS — Z79.899 OTHER LONG TERM (CURRENT) DRUG THERAPY: ICD-10-CM

## 2023-11-22 DIAGNOSIS — Z29.9 ENCOUNTER FOR PROPHYLACTIC MEASURES, UNSPECIFIED: ICD-10-CM

## 2023-11-22 DIAGNOSIS — E87.6 HYPOKALEMIA: ICD-10-CM

## 2023-11-22 DIAGNOSIS — R53.81 OTHER MALAISE: ICD-10-CM

## 2023-11-22 DIAGNOSIS — I10 ESSENTIAL (PRIMARY) HYPERTENSION: ICD-10-CM

## 2023-11-22 DIAGNOSIS — Z66 DO NOT RESUSCITATE: ICD-10-CM

## 2023-11-22 DIAGNOSIS — Z96.643 PRESENCE OF ARTIFICIAL HIP JOINT, BILATERAL: ICD-10-CM

## 2023-11-22 DIAGNOSIS — Z96.612 PRESENCE OF LEFT ARTIFICIAL SHOULDER JOINT: ICD-10-CM

## 2023-11-22 DIAGNOSIS — G89.29 OTHER CHRONIC PAIN: ICD-10-CM

## 2023-11-22 DIAGNOSIS — J96.01 ACUTE RESPIRATORY FAILURE WITH HYPOXIA: ICD-10-CM

## 2023-12-17 ENCOUNTER — TRANSCRIPTION ENCOUNTER (OUTPATIENT)
Age: 78
End: 2023-12-17

## 2023-12-17 RX ORDER — SIMVASTATIN 10 MG/1
10 TABLET, FILM COATED ORAL
Qty: 90 | Refills: 3 | Status: ACTIVE | COMMUNITY
Start: 2020-05-07 | End: 1900-01-01

## 2023-12-17 RX ORDER — LOSARTAN POTASSIUM 100 MG/1
100 TABLET, FILM COATED ORAL
Qty: 90 | Refills: 3 | Status: ACTIVE | COMMUNITY
Start: 2020-03-11 | End: 1900-01-01

## 2023-12-17 RX ORDER — PANTOPRAZOLE 40 MG/1
40 TABLET, DELAYED RELEASE ORAL DAILY
Qty: 90 | Refills: 3 | Status: ACTIVE | COMMUNITY
Start: 2017-11-20 | End: 1900-01-01

## 2023-12-17 RX ORDER — GABAPENTIN 300 MG/1
300 CAPSULE ORAL
Qty: 180 | Refills: 3 | Status: ACTIVE | COMMUNITY
Start: 2020-04-14 | End: 1900-01-01

## 2023-12-17 RX ORDER — ATENOLOL 25 MG/1
25 TABLET ORAL
Qty: 90 | Refills: 3 | Status: ACTIVE | COMMUNITY
Start: 2020-10-09 | End: 1900-01-01

## 2023-12-18 ENCOUNTER — RX RENEWAL (OUTPATIENT)
Age: 78
End: 2023-12-18

## 2023-12-18 RX ORDER — SERTRALINE HYDROCHLORIDE 100 MG/1
100 TABLET, FILM COATED ORAL
Qty: 90 | Refills: 1 | Status: ACTIVE | COMMUNITY
Start: 2020-04-07 | End: 1900-01-01

## 2023-12-20 DIAGNOSIS — Z74.09 OTHER REDUCED MOBILITY: ICD-10-CM

## 2024-02-28 NOTE — DISCUSSION/SUMMARY
[FreeTextEntry1] : stable exam, labs in office HTN stable on meds Lipids stable on statin A1c- check repeat dementia/ behavior- r/o infectious

## 2024-02-28 NOTE — ADDENDUM
[FreeTextEntry1] : Patient will require a commode for home use as patient is currently confided to a single room/floor in home and is unable to navigate to the location where the current toilet is located due to unstable gait. Additionally, patient requires a patient lift, i.e. Lynn Lift, to transfer between bed and chair/commode as without the lift, the patient would be confined to a bed. Orders for Commode and Lynn Lift placed.

## 2024-03-20 RX ORDER — QUETIAPINE FUMARATE 50 MG/1
50 TABLET ORAL AT BEDTIME
Qty: 90 | Refills: 3 | Status: ACTIVE | COMMUNITY
Start: 2023-07-05 | End: 1900-01-01

## 2024-06-04 NOTE — PROGRESS NOTE ADULT - PROBLEM SELECTOR PLAN 3
Detail Level: Detailed Quality 431: Preventive Care And Screening: Unhealthy Alcohol Use - Screening: Patient not identified as an unhealthy alcohol user when screened for unhealthy alcohol use using a systematic screening method Quality 486: Dermatitis - Improvement In Patient-Reported Itch Severity: Itch severity assessment score is reduced by 3 or more points from the initial (index) assessment score to the follow-up visit score Quality 226: Preventive Care And Screening: Tobacco Use: Screening And Cessation Intervention: Patient screened for tobacco use and is an ex/non-smoker H/o HTN, takes losartan 100 qd. Found to have /66 in ED that increased to 211/105, likely 2/2 pain. Received hydral 10 and toradol 15; BP improved to 140's. Now s/p home dose losartan and Lasix for Flush Pulmonary Edema   Plan:  - c/w home losartan 100mg  - pain control  - cautious fluid use given h/o PO lasix use of unknown reason

## 2024-08-08 NOTE — PATIENT PROFILE ADULT. - RELATIONSHIP TO PATIENT
[place] HOSPITALIST  DISCHARGE SUMMARY     Anastasia Hobson Patient Status:  Inpatient    1941 MRN [id number]   Location [facility] 7NE-A Attending No att. providers found   Hosp Day # 8 PCP Houston Prado MD     Date of Admission: 2024  Date of Discharge:  2024     Discharge Disposition: Home or Self Care    Discharge Diagnosis:    #Paroxysmal a. Fib with RVR  - treated with Cardizem drip   - Amiodarone per cards  - Eliquis   - Echo with normal EF, mild MR  - sp DCCV, sp ablation 2023    #Soft tissue mass   -ENT eval appreciated   -S/p IR guided biopsy on 8.5  -F/u biopsy report     #Left neck pain   - 2/2 soft tissue mass vs chronic pain from ACDF surgery history  -Patient follows with neuro surgery and pain specialist  -Continue pain control      #History of ACDF C4-6  Patient follows with spine surgery as outpatient, she was seeking a 2nd opinion and has a schedule next week   Spine surgery eval appreciated      #Dysphagia     #Hypercalcemia - resolved  #CKD 3B   #Essential hypertension   #Dyslipidemia      #Diabetes mellitus  -A1c - 6.9     #Bladder cancer        History of Present Illness: Anastasia Hobson is a 82 year old female with a history of atrial fibrillation on DOAC, essential hypertension, dyslipidemia, diabetes mellitus, bladder cancer and cervical fracture sp C4-6 ACDF. Patient presents with left neck pain for the last three weeks - though review of outpatient records reveals complaints for several months. Patient states she has had progressively worsening left neck pain. No falls or injuries. No numbness, tingling or weakness. She has felt unsteady with coordination of legs. No chest pain or shortness of breath. No nausea, vomiting, diarrhea. Pain rated 7/10.          Brief Synopsis: patient admitted and placed on cardizem drip which later was transitioned to oral antiarrhythmic. Her neck biopsy is still pending and she will follow up as outpatient.     Lace+  Score: 74  59-90 High Risk  29-58 Medium Risk  0-28   Low Risk       TCM Follow-Up Recommendation:  LACE > 58: High Risk of readmission after discharge from the hospital.      Consultants:  Cardiology, ENT, neurosurgery    Discharge Medication List:        [facility] reviewed: NA    Follow-up appointment:   [facility], [address]  [facility] [address] [phone]  Call  choose option 2 for neurosurgery or pain follow up    Houston Prado MD  [address]  [facility] [address]  [phone]    Follow up in 1 week(s)      Mindy Daniels MD  [address] [facility] [id number] [phone]    Follow up in 2 week(s)  office will call to schedule follow up    Appointments for Next 30 Days 2024 - 9/10/2024      None            Vital signs:       Physical Exam:    General: No acute distress   Lungs: clear to auscultation  Cardiovascular: S1, S2  Abdomen: Soft      -----------------------------------------------------------------------------------------------  PATIENT DISCHARGE INSTRUCTIONS: See electronic chart    Patience Rosa MD    Total time spent on discharge plannin minutes     The  [place] Cures Act makes medical notes like these available to patients in the interest of transparency. Please be advised this is a medical document. Medical documents are intended to carry relevant information, facts as evident, and the clinical opinion of the practitioner. The medical note is intended as peer to peer communication and may appear blunt or direct. It is written in medical language and may contain abbreviations or verbiage that are unfamiliar.      son

## 2024-08-14 NOTE — PROGRESS NOTE ADULT - SUBJECTIVE AND OBJECTIVE BOX
[FreeTextEntry1] : 50 year old female with T2DM, DORENE, PCOS and Fatty liver presents for follow up of weight gain. She reports that she has struggled with her weight for some time. However, most recently it has become more of a problem for her. She feels it is now impacting her ability to move around and do things that she normally does around the house. She does try to follow a healthy lifestyle She has made additional changes since her last visit. Cut out sugar from coffee.  She does have DORENE and uses her CPAP nightly. She feels her quality of sleep has improved.   11/17/23 Since her last visit, she continues to take Metformin 750 mg BID and Mounjaro 10 mg weekly. Feels well on Mounjaro has more appetite suppression. Reports that she began to go to Yoga 5 days a week once a day. She has been walking her son's [employer] [facility] for 30-45 min a few times a week. She feels her body has toned up somewhat. Has been trying to be more active. Feels that since increasing her water intake she has not felt as fatigued. She is down another 6 lbs since her last visit. Does feel that some of the weight loss has stalled. Was started on Lisinopril for renal protection, and BP has improved.  2/1/24 Reports increased nausea 24-48 hrs after injection which started after having a GI virus in December. On occasion has had a few episodes of vomiting. She did lower her dose to 7.5 mg weekly but last week ran out and used 10 mg. Had some mild increase in symptoms. Has not been able to correlate to food or anything else. Continues with the same lifestyle changes. She is down another 4 lbs.   8/13/24 Since her last visit, she continues to work on lifestyle changes. She does yoga and uses the treadmill regularly. Also is active in the garden. Reports following a healthy diet. She has lost 6 lbs but feels has lost more inches. Continues to take Mounjaro 10 mg weekly for DM and weight loss. Reports nausea now only the same day or day after the injection.   **********STEP DOWN FROM 7LACH to 4URIS***********    Hospital Course:     78F PMHx vascular dementia, HTN, HLD, OA s/p b/l knee, hip replacements presenting to the hospital due to outpatient labs showing Hgb of 4.5. The patient is unable to provide history due to dementia, therefore the patient's son is the main source of history. The patient has been feeling SOB, weakness and worsening demential for the past 2-3 weeks. No recent dark stools or bright blood noted in stools. On arrival, Hgb was 4.4 for which the patient received 2 units of pRBC. The patient was noted with crackles on exam in AM and was given Lasix for Flash Pulmonary edema. Repeat Hgb showed proper improvement (6.6). The patient was found with hypoxia on arrival and was placed on HFNC which was weaned to 6L NC in AM. The patient was also noted with Hypertension for which she was given Hydralazine in the ED, losartan in the AM and is also s/p Lasix. Her hypoxia was due to pulmonary edema 2/2/ volume from repeat transfusions. She is stable on room air s/p lasix 20 mg IVx3. She required one more pRBC before her EGD on 11/16. EGD: gastritis (biopsy) angioectasias (thermotherapy) in antrum and duodenum. She is now hemodynamically stable with stable hgb and ready for stepdown.    SUBJECTIVE / INTERVAL HPI: Patient seen and examined at bedside. Pt is AOx1, responds to voice but unable to fully comprehend speech. Resting comfortably in bed, and does not appear to be in any acute distress. ROS could not be completed due to patient's mental status.      VITAL SIGNS:  Vital Signs Last 24 Hrs  T(C): 36.9 (17 Nov 2023 14:09), Max: 36.9 (17 Nov 2023 06:05)  T(F): 98.5 (17 Nov 2023 14:09), Max: 98.5 (17 Nov 2023 14:09)  HR: 75 (17 Nov 2023 16:20) (74 - 82)  BP: 166/79 (17 Nov 2023 16:20) (156/69 - 167/72)  BP(mean): 113 (17 Nov 2023 15:43) (99 - 113)  RR: 17 (17 Nov 2023 16:20) (16 - 18)  SpO2: 96% (17 Nov 2023 16:20) (91% - 96%)    Parameters below as of 17 Nov 2023 16:20  Patient On (Oxygen Delivery Method): room air        PHYSICAL EXAM:    General: NAD; Azeri speaking, slurred speech that is not new, able to answer questions and cooperating to exam.   HEENT: PERRL; EOMI; MMM  Neck: supple; no JVD  Cardiac: RRR; +S1/S2, no murmurs  Pulm: CTAB no w/r/r  GI: soft, NT/ND, +BS  Extremities: WWP; no edema, clubbing or cyanosis  Vasc: 2+ radial, DP pulses B/L  Neuro: AAOx2; no focal deficits    MEDICATIONS:  MEDICATIONS  (STANDING):  acetaminophen     Tablet .. 650 milliGRAM(s) Oral every 6 hours  atenolol  Tablet 25 milliGRAM(s) Oral daily  ferrous    sulfate 325 milliGRAM(s) Oral daily  gabapentin 300 milliGRAM(s) Oral daily  hydrALAZINE Injectable 10 milliGRAM(s) IV Push once  lidocaine   4% Patch 1 Patch Transdermal every 24 hours  losartan 100 milliGRAM(s) Oral daily  pantoprazole  Injectable 40 milliGRAM(s) IV Push every 12 hours  polyethylene glycol 3350 17 Gram(s) Oral daily  QUEtiapine 100 milliGRAM(s) Oral at bedtime  senna 2 Tablet(s) Oral at bedtime  sertraline 100 milliGRAM(s) Oral daily  simvastatin 10 milliGRAM(s) Oral at bedtime    MEDICATIONS  (PRN):      ALLERGIES:  Allergies    No Known Allergies    Intolerances        LABS:                        8.0    11.45 )-----------( 360      ( 17 Nov 2023 05:30 )             28.4     11-17    140  |  104  |  12  ----------------------------<  114<H>  3.5   |  26  |  0.66    Ca    9.5      17 Nov 2023 05:30  Phos  1.8     11-17  Mg     1.9     11-17    TPro  6.8  /  Alb  3.9  /  TBili  0.5  /  DBili  x   /  AST  21  /  ALT  13  /  AlkPhos  107  11-16      Urinalysis Basic - ( 17 Nov 2023 05:30 )    Color: x / Appearance: x / SG: x / pH: x  Gluc: 114 mg/dL / Ketone: x  / Bili: x / Urobili: x   Blood: x / Protein: x / Nitrite: x   Leuk Esterase: x / RBC: x / WBC x   Sq Epi: x / Non Sq Epi: x / Bacteria: x      CAPILLARY BLOOD GLUCOSE      POCT Blood Glucose.: 107 mg/dL (17 Nov 2023 06:06)      RADIOLOGY & ADDITIONAL TESTS: Reviewed.

## 2024-10-12 ENCOUNTER — EMERGENCY (EMERGENCY)
Facility: HOSPITAL | Age: 79
LOS: 1 days | Discharge: HOME | End: 2024-10-12
Attending: EMERGENCY MEDICINE | Admitting: EMERGENCY MEDICINE
Payer: MEDICARE

## 2024-10-12 VITALS
TEMPERATURE: 98 F | DIASTOLIC BLOOD PRESSURE: 118 MMHG | WEIGHT: 179.9 LBS | OXYGEN SATURATION: 94 % | HEIGHT: 68 IN | RESPIRATION RATE: 17 BRPM | HEART RATE: 67 BPM | SYSTOLIC BLOOD PRESSURE: 180 MMHG

## 2024-10-12 VITALS
DIASTOLIC BLOOD PRESSURE: 90 MMHG | OXYGEN SATURATION: 95 % | TEMPERATURE: 98 F | SYSTOLIC BLOOD PRESSURE: 195 MMHG | HEART RATE: 70 BPM | RESPIRATION RATE: 20 BRPM

## 2024-10-12 DIAGNOSIS — Z96.642 PRESENCE OF LEFT ARTIFICIAL HIP JOINT: Chronic | ICD-10-CM

## 2024-10-12 DIAGNOSIS — Z96.651 PRESENCE OF RIGHT ARTIFICIAL KNEE JOINT: Chronic | ICD-10-CM

## 2024-10-12 DIAGNOSIS — Z96.641 PRESENCE OF RIGHT ARTIFICIAL HIP JOINT: Chronic | ICD-10-CM

## 2024-10-12 DIAGNOSIS — Z41.9 ENCOUNTER FOR PROCEDURE FOR PURPOSES OTHER THAN REMEDYING HEALTH STATE, UNSPECIFIED: Chronic | ICD-10-CM

## 2024-10-12 DIAGNOSIS — Z96.652 PRESENCE OF LEFT ARTIFICIAL KNEE JOINT: Chronic | ICD-10-CM

## 2024-10-12 LAB
ALBUMIN SERPL ELPH-MCNC: 4 G/DL — SIGNIFICANT CHANGE UP (ref 3.3–5)
ALP SERPL-CCNC: 141 U/L — HIGH (ref 40–120)
ALT FLD-CCNC: 16 U/L — SIGNIFICANT CHANGE UP (ref 10–45)
ANION GAP SERPL CALC-SCNC: 10 MMOL/L — SIGNIFICANT CHANGE UP (ref 5–17)
ANISOCYTOSIS BLD QL: SIGNIFICANT CHANGE UP
APPEARANCE UR: CLEAR — SIGNIFICANT CHANGE UP
AST SERPL-CCNC: 38 U/L — SIGNIFICANT CHANGE UP (ref 10–40)
BACTERIA # UR AUTO: NEGATIVE /HPF — SIGNIFICANT CHANGE UP
BASOPHILS # BLD AUTO: 0.08 K/UL — SIGNIFICANT CHANGE UP (ref 0–0.2)
BASOPHILS NFR BLD AUTO: 3.5 % — HIGH (ref 0–2)
BILIRUB SERPL-MCNC: 0.5 MG/DL — SIGNIFICANT CHANGE UP (ref 0.2–1.2)
BILIRUB UR-MCNC: NEGATIVE — SIGNIFICANT CHANGE UP
BLD GP AB SCN SERPL QL: NEGATIVE — SIGNIFICANT CHANGE UP
BUN SERPL-MCNC: 9 MG/DL — SIGNIFICANT CHANGE UP (ref 7–23)
CALCIUM SERPL-MCNC: 9.7 MG/DL — SIGNIFICANT CHANGE UP (ref 8.4–10.5)
CHLORIDE SERPL-SCNC: 103 MMOL/L — SIGNIFICANT CHANGE UP (ref 96–108)
CO2 SERPL-SCNC: 27 MMOL/L — SIGNIFICANT CHANGE UP (ref 22–31)
COLOR SPEC: YELLOW — SIGNIFICANT CHANGE UP
CREAT SERPL-MCNC: 0.6 MG/DL — SIGNIFICANT CHANGE UP (ref 0.5–1.3)
DACRYOCYTES BLD QL SMEAR: SLIGHT — SIGNIFICANT CHANGE UP
DIFF PNL FLD: NEGATIVE — SIGNIFICANT CHANGE UP
EGFR: 91 ML/MIN/1.73M2 — SIGNIFICANT CHANGE UP
EOSINOPHIL # BLD AUTO: 0.19 K/UL — SIGNIFICANT CHANGE UP (ref 0–0.5)
EOSINOPHIL NFR BLD AUTO: 8 % — HIGH (ref 0–6)
FLUAV AG NPH QL: SIGNIFICANT CHANGE UP
FLUBV AG NPH QL: SIGNIFICANT CHANGE UP
GIANT PLATELETS BLD QL SMEAR: PRESENT — SIGNIFICANT CHANGE UP
GLUCOSE SERPL-MCNC: 92 MG/DL — SIGNIFICANT CHANGE UP (ref 70–99)
GLUCOSE UR QL: NEGATIVE MG/DL — SIGNIFICANT CHANGE UP
HCT VFR BLD CALC: 31.1 % — LOW (ref 34.5–45)
HGB BLD-MCNC: 8.5 G/DL — LOW (ref 11.5–15.5)
HYPOCHROMIA BLD QL: SIGNIFICANT CHANGE UP
IRON SATN MFR SERPL: 35 UG/DL — SIGNIFICANT CHANGE UP (ref 30–160)
IRON SATN MFR SERPL: 9 % — LOW (ref 14–50)
KETONES UR-MCNC: NEGATIVE MG/DL — SIGNIFICANT CHANGE UP
LEUKOCYTE ESTERASE UR-ACNC: ABNORMAL
LYMPHOCYTES # BLD AUTO: 0.49 K/UL — LOW (ref 1–3.3)
LYMPHOCYTES # BLD AUTO: 20.3 % — SIGNIFICANT CHANGE UP (ref 13–44)
MACROCYTES BLD QL: SLIGHT — SIGNIFICANT CHANGE UP
MANUAL SMEAR VERIFICATION: SIGNIFICANT CHANGE UP
MCHC RBC-ENTMCNC: 23.9 PG — LOW (ref 27–34)
MCHC RBC-ENTMCNC: 27.3 GM/DL — LOW (ref 32–36)
MCV RBC AUTO: 87.6 FL — SIGNIFICANT CHANGE UP (ref 80–100)
MICROCYTES BLD QL: SLIGHT — SIGNIFICANT CHANGE UP
MONOCYTES # BLD AUTO: 0.07 K/UL — SIGNIFICANT CHANGE UP (ref 0–0.9)
MONOCYTES NFR BLD AUTO: 2.7 % — SIGNIFICANT CHANGE UP (ref 2–14)
NEUTROPHILS # BLD AUTO: 1.56 K/UL — LOW (ref 1.8–7.4)
NEUTROPHILS NFR BLD AUTO: 64.6 % — SIGNIFICANT CHANGE UP (ref 43–77)
NITRITE UR-MCNC: NEGATIVE — SIGNIFICANT CHANGE UP
OVALOCYTES BLD QL SMEAR: SIGNIFICANT CHANGE UP
PH UR: 7 — SIGNIFICANT CHANGE UP (ref 5–8)
PLAT MORPH BLD: ABNORMAL
PLATELET # BLD AUTO: 33 K/UL — LOW (ref 150–400)
POIKILOCYTOSIS BLD QL AUTO: SIGNIFICANT CHANGE UP
POLYCHROMASIA BLD QL SMEAR: SIGNIFICANT CHANGE UP
POTASSIUM SERPL-MCNC: 4.2 MMOL/L — SIGNIFICANT CHANGE UP (ref 3.5–5.3)
POTASSIUM SERPL-SCNC: 4.2 MMOL/L — SIGNIFICANT CHANGE UP (ref 3.5–5.3)
PROT SERPL-MCNC: 7.7 G/DL — SIGNIFICANT CHANGE UP (ref 6–8.3)
PROT UR-MCNC: NEGATIVE MG/DL — SIGNIFICANT CHANGE UP
RBC # BLD: 3.55 M/UL — LOW (ref 3.8–5.2)
RBC # BLD: 3.55 M/UL — LOW (ref 3.8–5.2)
RBC # FLD: 22.3 % — HIGH (ref 10.3–14.5)
RBC BLD AUTO: ABNORMAL
RBC CASTS # UR COMP ASSIST: 2 /HPF — SIGNIFICANT CHANGE UP (ref 0–4)
RETICS #: 39.4 K/UL — SIGNIFICANT CHANGE UP (ref 25–125)
RETICS/RBC NFR: 1.1 % — SIGNIFICANT CHANGE UP (ref 0.5–2.5)
RH IG SCN BLD-IMP: POSITIVE — SIGNIFICANT CHANGE UP
RSV RNA NPH QL NAA+NON-PROBE: SIGNIFICANT CHANGE UP
SARS-COV-2 RNA SPEC QL NAA+PROBE: SIGNIFICANT CHANGE UP
SCHISTOCYTES BLD QL AUTO: SLIGHT — SIGNIFICANT CHANGE UP
SODIUM SERPL-SCNC: 140 MMOL/L — SIGNIFICANT CHANGE UP (ref 135–145)
SP GR SPEC: 1.01 — SIGNIFICANT CHANGE UP (ref 1–1.03)
SPHEROCYTES BLD QL SMEAR: SLIGHT — SIGNIFICANT CHANGE UP
SQUAMOUS # UR AUTO: 0 /HPF — SIGNIFICANT CHANGE UP (ref 0–5)
TIBC SERPL-MCNC: 405 UG/DL — SIGNIFICANT CHANGE UP (ref 220–430)
UIBC SERPL-MCNC: 370 UG/DL — SIGNIFICANT CHANGE UP (ref 110–370)
UROBILINOGEN FLD QL: 0.2 MG/DL — SIGNIFICANT CHANGE UP (ref 0.2–1)
VARIANT LYMPHS # BLD: 0.9 % — SIGNIFICANT CHANGE UP (ref 0–6)
WBC # BLD: 2.41 K/UL — LOW (ref 3.8–10.5)
WBC # FLD AUTO: 2.41 K/UL — LOW (ref 3.8–10.5)
WBC UR QL: 1 /HPF — SIGNIFICANT CHANGE UP (ref 0–5)

## 2024-10-12 PROCEDURE — 71045 X-RAY EXAM CHEST 1 VIEW: CPT | Mod: 26

## 2024-10-12 PROCEDURE — 93010 ELECTROCARDIOGRAM REPORT: CPT

## 2024-10-12 PROCEDURE — 99285 EMERGENCY DEPT VISIT HI MDM: CPT

## 2024-10-12 RX ORDER — SODIUM CHLORIDE 0.9 % (FLUSH) 0.9 %
500 SYRINGE (ML) INJECTION ONCE
Refills: 0 | Status: COMPLETED | OUTPATIENT
Start: 2024-10-12 | End: 2024-10-12

## 2024-10-12 RX ADMIN — Medication 500 MILLILITER(S): at 17:35

## 2024-10-12 NOTE — ED PROVIDER NOTE - NEUROLOGICAL, MLM
Alert , disoriented, + known dementia , , chronic ptosis L lid , moving all extremities. Alert , oriented to place / person/ disoriented to time , , + known dementia , , chronic ptosis L lid , moving all extremities.

## 2024-10-12 NOTE — ED ADULT TRIAGE NOTE - CHIEF COMPLAINT QUOTE
as per son patient has dementia and looks pale and the last time this happened she needed a blood transfusion.

## 2024-10-12 NOTE — ED ADULT NURSE NOTE - NSFALLHARMRISKINTERV_ED_ALL_ED
Assistance OOB with selected safe patient handling equipment if applicable/Assistance with ambulation/Communicate risk of Fall with Harm to all staff, patient, and family/Monitor gait and stability/Monitor for mental status changes and reorient to person, place, and time, as needed/Provide visual cue: red socks, yellow wristband, yellow gown, etc/Reinforce activity limits and safety measures with patient and family/Toileting schedule using arm’s reach rule for commode and bathroom/Use of alarms - bed, stretcher, chair and/or video monitoring/Bed in lowest position, wheels locked, appropriate side rails in place/Call bell, personal items and telephone in reach/Instruct patient to call for assistance before getting out of bed/chair/stretcher/Non-slip footwear applied when patient is off stretcher/[place] to call system/Physically safe environment - no spills, clutter or unnecessary equipment/Purposeful Proactive Rounding/Room/bathroom lighting operational, light cord in reach

## 2024-10-12 NOTE — ED PROVIDER NOTE - CONSTITUTIONAL, MLM
normal... pale appearing , awake, alert, oriented to person, place, time/situation and in no apparent distress.

## 2024-10-12 NOTE — ED PROVIDER NOTE - NSFOLLOWUPINSTRUCTIONS_ED_ALL_ED_FT
Ms Lamb was anemic today with a Hgb of 8.5 , which is close to her usual baseline.     She today also had a low WBC ( white blood count ) and platelet count  I suspect most likely this is caused by a viral infection.   She should have a repeat CBC as an outpatient later this week to assess for improvement.     Suspected Viral infection.     SEEK IMMEDIATE MEDICAL CARE IF YOU HAVE ANY OF THE FOLLOWING SYMPTOMS: shortness of breath, chest pain, fever over 10 days, or lightheadedness/dizziness. increased lethargy

## 2024-10-12 NOTE — ED PROVIDER NOTE - NSICDXPASTMEDICALHX_GEN_ALL_CORE_FT
PAST MEDICAL HISTORY:  Aortic valve disease sclerotic aortic valve    HTN (hypertension)     OA (osteoarthritis)     Spondylisthesis

## 2024-10-12 NOTE — ED PROVIDER NOTE - CLINICAL SUMMARY MEDICAL DECISION MAKING FREE TEXT BOX
78 yo , appears pale as per son w increased lethargy, on rectal exam clear sample / no apparent GI bleed although consider this , pt w pale conjuntiva/ high suspician for recurrent anemia/ consider GI bleed, consider iron deficiency.   eval for metabolic abnormalities / infection .   dispo pending workup / reevaluation. 80 yo , appears pale as per son w increased lethargy, on rectal exam clear sample / no apparent GI bleed although consider this , pt w pale conjunctiva/ high suspicion for recurrent anemia/ consider GI bleed, consider iron deficiency.   eval for metabolic abnormalities / infection .   dispo pending workup / reevaluation. 78 yo , appears pale as per son w increased lethargy, on rectal exam clear sample / no apparent GI bleed although consider this , pt w pale conjunctiva/ high suspicion for recurrent anemia/ consider GI bleed, consider iron deficiency.   eval for metabolic abnormalities / infection .   dispo pending workup / reevaluation.    suspect viral infection w suppression of wbc / platelets, advised pt 's son to have her CBC rechecked this week. will feed / give small bolus as has been NPO today and d/c w continued supportive care.

## 2024-10-12 NOTE — ED PROVIDER NOTE - OBJECTIVE STATEMENT
79-year-old with history of vascular dementia, hypertension, hyperlipidemia, OA, history of prior admission in November 2023 for blood loss anemia, at that time found to have duodenal angioectasias and treated with thermal therapy and received iron, also with gastritis at that time and had been on meloxicam and naproxen at that time.  Patient brought in today by her son as he thought she looked pale similar to during prior episode of anemia, reports she seemed more fatigued and less talkative than usual, patient with advanced dementia and at baseline intermittently knows who he is, 1 episode of diarrhea earlier in the week, no bowel movement in 2 days and chronic constipation, patient's son reports he was told stool was dark but not black and no blood, patient is on iron every other day.

## 2024-10-12 NOTE — ED ADULT NURSE NOTE - OBJECTIVE STATEMENT
79F, hx dementia, HLD, HTN, OA presents with son d/t generalized weakness. As per son, this morning, patient was pale appearing and not really chewing her food and less talkative. Patient had similar presentation and was found to be anemic requiring blood transfusion. Reports dark brown stool-- not balck in color. 79F, hx dementia, HLD, HTN, OA presents with son d/t generalized weakness. As per son, this morning, patient was pale appearing and not really chewing her food and less talkative. Patient had similar presentation and was found to be anemic requiring blood transfusion. Reports dark brown stool-- not black in color. Patient denies pain upon interview. AO to person and place. Able to recognize her son -- caregiver. Abd soft nt/nd. Denies fevers, chills.

## 2024-10-12 NOTE — ED PROVIDER NOTE - PATIENT PORTAL LINK FT
You can access the FollowMyHealth Patient Portal offered by [facility] by registering at the following website: [email or web address]. By joining [employer]’s FollowMyHealth portal, you will also be able to view your health information using other applications (apps) compatible with our system.

## 2024-10-13 LAB
CULTURE RESULTS: SIGNIFICANT CHANGE UP
SPECIMEN SOURCE: SIGNIFICANT CHANGE UP

## 2024-10-15 DIAGNOSIS — M19.90 UNSPECIFIED OSTEOARTHRITIS, UNSPECIFIED SITE: ICD-10-CM

## 2024-10-15 DIAGNOSIS — R53.1 WEAKNESS: ICD-10-CM

## 2024-10-15 DIAGNOSIS — R53.83 OTHER FATIGUE: ICD-10-CM

## 2024-10-15 DIAGNOSIS — H02.402 UNSPECIFIED PTOSIS OF LEFT EYELID: ICD-10-CM

## 2024-10-15 DIAGNOSIS — F01.50 VASCULAR DEMENTIA, UNSPECIFIED SEVERITY, WITHOUT BEHAVIORAL DISTURBANCE, PSYCHOTIC DISTURBANCE, MOOD DISTURBANCE, AND ANXIETY: ICD-10-CM

## 2024-10-15 DIAGNOSIS — I10 ESSENTIAL (PRIMARY) HYPERTENSION: ICD-10-CM

## 2024-10-15 DIAGNOSIS — Z86.2 PERSONAL HISTORY OF DISEASES OF THE BLOOD AND BLOOD-FORMING ORGANS AND CERTAIN DISORDERS INVOLVING THE IMMUNE MECHANISM: ICD-10-CM

## 2024-10-15 DIAGNOSIS — E78.5 HYPERLIPIDEMIA, UNSPECIFIED: ICD-10-CM

## 2024-10-28 NOTE — OCCUPATIONAL THERAPY INITIAL EVALUATION ADULT - LEVEL OF INDEPENDENCE: SUPINE/SIT, REHAB EVAL
Continue with the nebulizer treatments as needed for coughing, wheezing or shortness of breath.    Tylenol or ibuprofen for fever or pain.    Continue with your amoxicillin and azithromycin antibiotics.    Return to the ER for worse symptoms, difficulty with breathing not better with the nebulizer treatment or O2 saturations on pulse oximeter below 90%   minimum assist (75% patients effort)

## 2024-11-20 PROCEDURE — 36415 COLL VENOUS BLD VENIPUNCTURE: CPT

## 2024-11-20 PROCEDURE — 71045 X-RAY EXAM CHEST 1 VIEW: CPT

## 2024-11-20 PROCEDURE — 86901 BLOOD TYPING SEROLOGIC RH(D): CPT

## 2024-11-20 PROCEDURE — 99285 EMERGENCY DEPT VISIT HI MDM: CPT | Mod: 25

## 2024-11-20 PROCEDURE — 83540 ASSAY OF IRON: CPT

## 2024-11-20 PROCEDURE — 83550 IRON BINDING TEST: CPT

## 2024-11-20 PROCEDURE — 93005 ELECTROCARDIOGRAM TRACING: CPT

## 2024-11-20 PROCEDURE — 80053 COMPREHEN METABOLIC PANEL: CPT

## 2024-11-20 PROCEDURE — 81001 URINALYSIS AUTO W/SCOPE: CPT

## 2024-11-20 PROCEDURE — 86900 BLOOD TYPING SEROLOGIC ABO: CPT

## 2024-11-20 PROCEDURE — 87637 SARSCOV2&INF A&B&RSV AMP PRB: CPT

## 2024-11-20 PROCEDURE — 87086 URINE CULTURE/COLONY COUNT: CPT

## 2024-11-20 PROCEDURE — 85025 COMPLETE CBC W/AUTO DIFF WBC: CPT

## 2024-11-20 PROCEDURE — 85045 AUTOMATED RETICULOCYTE COUNT: CPT

## 2024-11-20 PROCEDURE — 86850 RBC ANTIBODY SCREEN: CPT

## 2024-12-16 ENCOUNTER — APPOINTMENT (OUTPATIENT)
Dept: NEUROLOGY | Facility: CLINIC | Age: 79
End: 2024-12-16
Payer: MEDICARE

## 2024-12-16 PROCEDURE — G2211 COMPLEX E/M VISIT ADD ON: CPT

## 2024-12-16 PROCEDURE — 99214 OFFICE O/P EST MOD 30 MIN: CPT

## 2024-12-30 NOTE — PATIENT PROFILE ADULT - NSPRONUTRITIONRISK_GEN_A_NUR
Plan: Pt using nystatin powder from PCP Detail Level: Zone Initiate Treatment: Hydroquinone- apply to the face once daily No indicators present

## 2025-07-30 NOTE — ED ADULT NURSE NOTE - SUICIDE SCREENING QUESTION 3
[General Appearance - Well Developed] : well developed [Normal Appearance] : normal appearance [Well Groomed] : well groomed [General Appearance - Well Nourished] : well nourished [No Deformities] : no deformities [General Appearance - In No Acute Distress] : no acute distress [Normal Conjunctiva] : the conjunctiva exhibited no abnormalities [Eyelids - No Xanthelasma] : the eyelids demonstrated no xanthelasmas [Normal Oral Mucosa] : normal oral mucosa [No Oral Pallor] : no oral pallor [No Oral Cyanosis] : no oral cyanosis Patient unable to complete [Abnormal Walk] : normal gait [Gait - Sufficient For Exercise Testing] : the gait was sufficient for exercise testing [Nail Clubbing] : no clubbing of the fingernails [Cyanosis, Localized] : no localized cyanosis [Petechial Hemorrhages (___cm)] : no petechial hemorrhages [Skin Color & Pigmentation] : normal skin color and pigmentation [] : no rash [No Venous Stasis] : no venous stasis [Skin Lesions] : no skin lesions [No Skin Ulcers] : no skin ulcer [No Xanthoma] : no  xanthoma was observed [Oriented To Time, Place, And Person] : oriented to person, place, and time [Affect] : the affect was normal [Mood] : the mood was normal [No Anxiety] : not feeling anxious [FreeTextEntry1] : No JVD

## (undated) DEVICE — FORCEP RADIAL JAW 4 W NDL 2.2MM 2.8MM 240CM ORANGE DISP

## (undated) DEVICE — ELCTR REM POLYHESIVE ADULT PT RETURN 15FT

## (undated) DEVICE — PROBE FIAPC DIA 2.3MM/7FR LNTH 220CM/7.2FT